# Patient Record
Sex: FEMALE | Race: WHITE | NOT HISPANIC OR LATINO | Employment: OTHER | URBAN - METROPOLITAN AREA
[De-identification: names, ages, dates, MRNs, and addresses within clinical notes are randomized per-mention and may not be internally consistent; named-entity substitution may affect disease eponyms.]

---

## 2019-12-02 PROBLEM — I50.23 ACUTE ON CHRONIC SYSTOLIC (CONGESTIVE) HEART FAILURE: Status: ACTIVE | Noted: 2018-04-12

## 2019-12-02 PROBLEM — E78.5 HYPERLIPIDEMIA LDL GOAL <70: Status: ACTIVE | Noted: 2019-12-02

## 2019-12-02 PROBLEM — I25.10 CORONARY ARTERY DISEASE INVOLVING NATIVE CORONARY ARTERY OF NATIVE HEART WITHOUT ANGINA PECTORIS: Status: ACTIVE | Noted: 2019-12-02

## 2019-12-02 PROBLEM — I25.5 ISCHEMIC CARDIOMYOPATHY: Status: ACTIVE | Noted: 2018-04-29

## 2019-12-02 PROBLEM — Z95.810 ICD (IMPLANTABLE CARDIOVERTER-DEFIBRILLATOR) IN PLACE: Status: ACTIVE | Noted: 2019-12-02

## 2019-12-02 PROBLEM — I50.9 CHF (NYHA CLASS III, ACC/AHA STAGE C): Status: ACTIVE | Noted: 2017-12-26

## 2019-12-02 PROBLEM — I42.0 DILATED CARDIOMYOPATHY: Status: ACTIVE | Noted: 2018-04-29

## 2019-12-02 PROBLEM — Z98.61 S/P CORONARY ANGIOPLASTY: Status: ACTIVE | Noted: 2019-12-02

## 2019-12-02 PROBLEM — R07.9 CHEST PAIN: Status: ACTIVE | Noted: 2018-02-09

## 2019-12-02 PROBLEM — I44.7 LBBB (LEFT BUNDLE BRANCH BLOCK): Status: ACTIVE | Noted: 2018-04-29

## 2019-12-11 PROBLEM — I34.0 NON-RHEUMATIC MITRAL REGURGITATION: Status: ACTIVE | Noted: 2018-04-12

## 2019-12-11 PROBLEM — I34.0 SEVERE MITRAL REGURGITATION: Status: ACTIVE | Noted: 2018-04-12

## 2019-12-11 PROBLEM — I44.7 LEFT BUNDLE BRANCH BLOCK: Status: ACTIVE | Noted: 2018-04-29

## 2019-12-11 PROBLEM — N17.9 ACUTE KIDNEY INJURY: Status: ACTIVE | Noted: 2018-03-07

## 2019-12-11 PROBLEM — D50.9 MICROCYTIC HYPOCHROMIC ANEMIA: Status: ACTIVE | Noted: 2018-03-07

## 2019-12-11 PROBLEM — Z95.5 PRESENCE OF DRUG COATED STENT IN LEFT CIRCUMFLEX CORONARY ARTERY: Status: ACTIVE | Noted: 2017-01-20

## 2019-12-16 PROBLEM — I50.23 ACUTE ON CHRONIC SYSTOLIC CHF (CONGESTIVE HEART FAILURE): Status: ACTIVE | Noted: 2019-12-16

## 2019-12-17 PROBLEM — I50.23 ACUTE ON CHRONIC SYSTOLIC (CONGESTIVE) HEART FAILURE: Status: RESOLVED | Noted: 2018-04-12 | Resolved: 2019-12-17

## 2019-12-17 PROBLEM — I27.20 PULMONARY HTN: Status: ACTIVE | Noted: 2019-12-17

## 2020-01-30 PROBLEM — E11.65 UNCONTROLLED TYPE 2 DIABETES MELLITUS WITH HYPERGLYCEMIA: Status: ACTIVE | Noted: 2020-01-30

## 2020-01-30 PROBLEM — I07.1 SEVERE TRICUSPID REGURGITATION: Status: ACTIVE | Noted: 2020-01-30

## 2020-01-30 PROBLEM — I70.1 ATHEROSCLEROTIC RENAL ARTERY STENOSIS: Status: ACTIVE | Noted: 2020-01-30

## 2020-02-01 PROBLEM — R73.9 HYPERGLYCEMIA: Status: ACTIVE | Noted: 2020-02-01

## 2020-02-13 PROBLEM — R51.9 NONINTRACTABLE HEADACHE: Status: ACTIVE | Noted: 2020-02-13

## 2020-02-13 PROBLEM — N39.0 RECURRENT UTI: Status: ACTIVE | Noted: 2020-02-13

## 2020-02-14 PROBLEM — I44.7 LEFT BUNDLE BRANCH BLOCK: Status: RESOLVED | Noted: 2018-04-29 | Resolved: 2020-02-14

## 2020-02-28 PROBLEM — I50.23 ACUTE ON CHRONIC SYSTOLIC CHF (CONGESTIVE HEART FAILURE): Status: RESOLVED | Noted: 2019-12-16 | Resolved: 2020-02-28

## 2020-03-12 PROBLEM — I50.9 ACUTE ON CHRONIC CONGESTIVE HEART FAILURE: Status: ACTIVE | Noted: 2020-03-12

## 2020-03-13 PROBLEM — J96.01 ACUTE RESPIRATORY FAILURE WITH HYPOXEMIA: Status: ACTIVE | Noted: 2020-03-13

## 2020-03-13 PROBLEM — Z59.6 PATIENT CANNOT AFFORD MEDICATIONS: Status: ACTIVE | Noted: 2020-03-13

## 2020-03-13 PROBLEM — Z59.86 PATIENT CANNOT AFFORD MEDICATIONS: Status: ACTIVE | Noted: 2020-03-13

## 2020-03-15 PROBLEM — N39.0 UTI (URINARY TRACT INFECTION): Status: ACTIVE | Noted: 2020-03-15

## 2020-03-15 PROBLEM — J01.10 ACUTE FRONTAL SINUSITIS: Status: ACTIVE | Noted: 2020-03-15

## 2020-03-16 PROBLEM — D61.818 PANCYTOPENIA: Status: ACTIVE | Noted: 2020-03-16

## 2020-03-16 PROBLEM — J96.01 ACUTE RESPIRATORY FAILURE WITH HYPOXEMIA: Status: RESOLVED | Noted: 2020-03-13 | Resolved: 2020-03-16

## 2020-05-29 PROBLEM — I50.9 CHF (NYHA CLASS III, ACC/AHA STAGE C): Status: RESOLVED | Noted: 2017-12-26 | Resolved: 2020-05-29

## 2020-05-29 PROBLEM — I50.9 ACUTE ON CHRONIC CONGESTIVE HEART FAILURE: Status: RESOLVED | Noted: 2020-03-12 | Resolved: 2020-05-29

## 2020-06-09 PROBLEM — I36.1 NONRHEUMATIC TRICUSPID VALVE REGURGITATION: Status: ACTIVE | Noted: 2020-01-30

## 2020-06-09 PROBLEM — G47.39 MIXED SLEEP APNEA: Status: ACTIVE | Noted: 2020-06-09

## 2020-06-09 PROBLEM — I42.0 ISCHEMIC DILATED CARDIOMYOPATHY: Status: ACTIVE | Noted: 2018-04-29

## 2020-06-09 PROBLEM — N18.30 STAGE 3 CHRONIC KIDNEY DISEASE: Status: ACTIVE | Noted: 2020-06-09

## 2020-06-10 PROBLEM — Z79.01 CHRONIC ANTICOAGULATION: Status: ACTIVE | Noted: 2020-06-10

## 2020-06-15 PROBLEM — J01.10 ACUTE FRONTAL SINUSITIS: Status: RESOLVED | Noted: 2020-03-15 | Resolved: 2020-06-15

## 2020-08-10 PROBLEM — I70.1 RENAL ARTERY STENOSIS: Status: ACTIVE | Noted: 2020-08-10

## 2020-12-11 PROBLEM — R00.2 PALPITATIONS: Status: ACTIVE | Noted: 2020-12-11

## 2021-05-17 PROBLEM — H25.13 NUCLEAR SCLEROTIC CATARACT OF BOTH EYES: Status: ACTIVE | Noted: 2021-05-17

## 2021-05-17 PROBLEM — E08.3493: Status: ACTIVE | Noted: 2021-05-17

## 2021-07-27 PROBLEM — D63.8 ANEMIA OF CHRONIC DISEASE: Status: ACTIVE | Noted: 2021-07-27

## 2021-09-27 PROBLEM — I16.0 HYPERTENSIVE URGENCY: Status: ACTIVE | Noted: 2021-09-27

## 2021-09-27 PROBLEM — I50.9 CONGESTIVE HEART FAILURE: Status: ACTIVE | Noted: 2021-09-27

## 2021-09-27 PROBLEM — I50.43 ACUTE ON CHRONIC COMBINED SYSTOLIC AND DIASTOLIC CHF (CONGESTIVE HEART FAILURE): Status: ACTIVE | Noted: 2021-09-27

## 2021-09-28 PROBLEM — R19.7 DIARRHEA OF PRESUMED INFECTIOUS ORIGIN: Status: ACTIVE | Noted: 2021-09-28

## 2021-09-28 PROBLEM — G47.30 SLEEP APNEA IN ADULT: Status: ACTIVE | Noted: 2021-09-28

## 2021-09-28 PROBLEM — I16.0 HYPERTENSIVE URGENCY: Status: RESOLVED | Noted: 2021-09-27 | Resolved: 2021-09-28

## 2021-09-28 PROBLEM — I16.1 HYPERTENSIVE EMERGENCY: Status: ACTIVE | Noted: 2021-09-28

## 2021-09-29 PROBLEM — I16.1 HYPERTENSIVE EMERGENCY: Status: RESOLVED | Noted: 2021-09-28 | Resolved: 2021-09-29

## 2021-10-06 PROBLEM — I47.10 SVT (SUPRAVENTRICULAR TACHYCARDIA): Status: ACTIVE | Noted: 2021-10-06

## 2021-11-10 PROBLEM — I50.43 ACUTE ON CHRONIC COMBINED SYSTOLIC AND DIASTOLIC CHF (CONGESTIVE HEART FAILURE): Status: RESOLVED | Noted: 2021-09-27 | Resolved: 2021-11-10

## 2021-11-10 PROBLEM — Z86.79 S/P CATHETER ABLATION OF SLOW PATHWAY: Status: ACTIVE | Noted: 2021-11-10

## 2021-11-10 PROBLEM — R60.0 EDEMA OF LEFT UPPER ARM: Status: ACTIVE | Noted: 2021-11-10

## 2021-11-10 PROBLEM — Z95.810 ICD (IMPLANTABLE CARDIOVERTER-DEFIBRILLATOR) IN PLACE: Status: RESOLVED | Noted: 2019-12-02 | Resolved: 2021-11-10

## 2021-11-10 PROBLEM — Z98.890 S/P CATHETER ABLATION OF SLOW PATHWAY: Status: ACTIVE | Noted: 2021-11-10

## 2021-11-10 PROBLEM — G47.30 SLEEP APNEA IN ADULT: Status: RESOLVED | Noted: 2021-09-28 | Resolved: 2021-11-10

## 2021-11-11 PROBLEM — J18.9 COMMUNITY ACQUIRED PNEUMONIA: Status: ACTIVE | Noted: 2021-11-11

## 2021-11-14 PROBLEM — I50.42 CHRONIC COMBINED SYSTOLIC (CONGESTIVE) AND DIASTOLIC (CONGESTIVE) HEART FAILURE: Status: ACTIVE | Noted: 2021-09-27

## 2021-11-16 PROBLEM — J18.9 COMMUNITY ACQUIRED PNEUMONIA: Status: RESOLVED | Noted: 2021-11-11 | Resolved: 2021-11-16

## 2021-11-16 PROBLEM — J96.01 ACUTE HYPOXEMIC RESPIRATORY FAILURE: Status: RESOLVED | Noted: 2020-03-13 | Resolved: 2021-11-16

## 2021-11-17 ENCOUNTER — PATIENT OUTREACH (OUTPATIENT)
Dept: ADMINISTRATIVE | Facility: CLINIC | Age: 56
End: 2021-11-17

## 2021-11-17 ENCOUNTER — PATIENT MESSAGE (OUTPATIENT)
Dept: ADMINISTRATIVE | Facility: CLINIC | Age: 56
End: 2021-11-17

## 2021-11-29 PROBLEM — E87.5 HYPERKALEMIA: Status: ACTIVE | Noted: 2021-11-29

## 2021-11-29 PROBLEM — N18.9 ACUTE KIDNEY INJURY SUPERIMPOSED ON CKD: Status: ACTIVE | Noted: 2018-03-07

## 2022-01-04 PROBLEM — F32.A DEPRESSION: Status: ACTIVE | Noted: 2022-01-04

## 2022-01-04 PROBLEM — I50.43 ACUTE ON CHRONIC COMBINED SYSTOLIC AND DIASTOLIC HEART FAILURE: Status: ACTIVE | Noted: 2022-01-04

## 2022-01-05 PROBLEM — R11.0 NAUSEA: Status: ACTIVE | Noted: 2022-01-05

## 2022-01-06 PROBLEM — R11.0 NAUSEA: Status: RESOLVED | Noted: 2022-01-05 | Resolved: 2022-01-06

## 2022-01-06 PROBLEM — I50.43 ACUTE ON CHRONIC COMBINED SYSTOLIC AND DIASTOLIC HEART FAILURE: Status: RESOLVED | Noted: 2022-01-04 | Resolved: 2022-01-06

## 2022-01-11 PROBLEM — H04.123 DRY EYE SYNDROME OF BOTH EYES: Status: ACTIVE | Noted: 2022-01-11

## 2022-01-11 PROBLEM — H25.813 COMBINED FORM OF AGE-RELATED CATARACT, BOTH EYES: Status: ACTIVE | Noted: 2022-01-11

## 2022-01-11 PROBLEM — H35.033 HYPERTENSIVE RETINOPATHY OF BOTH EYES: Status: ACTIVE | Noted: 2022-01-11

## 2022-01-13 PROBLEM — R07.9 CHEST PAIN: Status: RESOLVED | Noted: 2018-02-09 | Resolved: 2022-01-13

## 2022-01-13 PROBLEM — E11.65 UNCONTROLLED TYPE 2 DIABETES MELLITUS WITH HYPERGLYCEMIA: Status: RESOLVED | Noted: 2020-01-30 | Resolved: 2022-01-13

## 2022-01-13 PROBLEM — R19.7 DIARRHEA OF PRESUMED INFECTIOUS ORIGIN: Status: RESOLVED | Noted: 2021-09-28 | Resolved: 2022-01-13

## 2022-01-13 PROBLEM — Z95.5 PRESENCE OF DRUG COATED STENT IN LEFT CIRCUMFLEX CORONARY ARTERY: Status: RESOLVED | Noted: 2017-01-20 | Resolved: 2022-01-13

## 2022-01-13 PROBLEM — R73.9 HYPERGLYCEMIA: Status: RESOLVED | Noted: 2020-02-01 | Resolved: 2022-01-13

## 2022-01-13 PROBLEM — I70.1 RENAL ARTERY STENOSIS: Status: RESOLVED | Noted: 2020-08-10 | Resolved: 2022-01-13

## 2022-01-13 PROBLEM — N39.0 RECURRENT UTI: Status: RESOLVED | Noted: 2020-02-13 | Resolved: 2022-01-13

## 2022-01-13 PROBLEM — D61.818 PANCYTOPENIA: Status: RESOLVED | Noted: 2020-03-16 | Resolved: 2022-01-13

## 2022-01-13 PROBLEM — I50.23 ACUTE ON CHRONIC SYSTOLIC CONGESTIVE HEART FAILURE: Status: RESOLVED | Noted: 2019-12-16 | Resolved: 2022-01-13

## 2022-01-13 PROBLEM — D50.9 MICROCYTIC HYPOCHROMIC ANEMIA: Status: RESOLVED | Noted: 2018-03-07 | Resolved: 2022-01-13

## 2022-01-13 PROBLEM — N18.9 ACUTE KIDNEY INJURY SUPERIMPOSED ON CKD: Status: RESOLVED | Noted: 2018-03-07 | Resolved: 2022-01-13

## 2022-01-13 PROBLEM — J96.01 ACUTE HYPOXEMIC RESPIRATORY FAILURE: Status: RESOLVED | Noted: 2020-03-13 | Resolved: 2022-01-13

## 2022-01-13 PROBLEM — N17.9 ACUTE KIDNEY INJURY SUPERIMPOSED ON CKD: Status: RESOLVED | Noted: 2018-03-07 | Resolved: 2022-01-13

## 2022-01-13 PROBLEM — N18.4 STAGE 4 CHRONIC KIDNEY DISEASE: Status: ACTIVE | Noted: 2020-06-09

## 2022-01-13 PROBLEM — I16.1 HYPERTENSIVE EMERGENCY: Status: RESOLVED | Noted: 2021-09-28 | Resolved: 2022-01-13

## 2022-01-13 PROBLEM — R00.2 PALPITATIONS: Status: RESOLVED | Noted: 2020-12-11 | Resolved: 2022-01-13

## 2022-01-13 PROBLEM — N39.0 UTI (URINARY TRACT INFECTION): Status: RESOLVED | Noted: 2020-03-15 | Resolved: 2022-01-13

## 2022-02-01 PROBLEM — R07.9 CHEST PAIN: Status: ACTIVE | Noted: 2022-02-01

## 2022-02-02 PROBLEM — J18.9 PNEUMONIA: Status: RESOLVED | Noted: 2021-11-11 | Resolved: 2022-02-02

## 2022-02-02 PROBLEM — R07.9 CHEST PAIN: Status: RESOLVED | Noted: 2022-02-01 | Resolved: 2022-02-02

## 2022-02-02 PROBLEM — I50.9 ACUTE ON CHRONIC CONGESTIVE HEART FAILURE: Status: RESOLVED | Noted: 2020-03-12 | Resolved: 2022-02-02

## 2022-02-02 PROBLEM — E78.5 HYPERLIPIDEMIA LDL GOAL <70: Status: RESOLVED | Noted: 2019-12-02 | Resolved: 2022-02-02

## 2022-03-09 PROBLEM — I50.43 ACUTE ON CHRONIC COMBINED SYSTOLIC AND DIASTOLIC CHF (CONGESTIVE HEART FAILURE): Status: ACTIVE | Noted: 2022-03-09

## 2022-03-09 PROBLEM — K21.9 GERD (GASTROESOPHAGEAL REFLUX DISEASE): Status: ACTIVE | Noted: 2022-03-09

## 2022-03-09 PROBLEM — R11.2 NON-INTRACTABLE VOMITING WITH NAUSEA: Status: ACTIVE | Noted: 2022-03-09

## 2022-03-09 PROBLEM — J18.9 PNEUMONIA DUE TO INFECTIOUS ORGANISM: Status: ACTIVE | Noted: 2022-03-09

## 2022-03-11 PROBLEM — J18.9 PNEUMONIA DUE TO INFECTIOUS ORGANISM: Status: RESOLVED | Noted: 2022-03-09 | Resolved: 2022-03-11

## 2022-03-11 PROBLEM — I50.42 CHRONIC COMBINED SYSTOLIC (CONGESTIVE) AND DIASTOLIC (CONGESTIVE) HEART FAILURE: Status: RESOLVED | Noted: 2021-09-27 | Resolved: 2022-03-11

## 2022-03-13 PROBLEM — B96.20 E. COLI UTI: Status: ACTIVE | Noted: 2020-03-15

## 2022-03-13 PROBLEM — I50.43 ACUTE ON CHRONIC COMBINED SYSTOLIC AND DIASTOLIC CHF (CONGESTIVE HEART FAILURE): Status: RESOLVED | Noted: 2022-03-09 | Resolved: 2022-03-13

## 2022-03-13 PROBLEM — R11.2 NON-INTRACTABLE VOMITING WITH NAUSEA: Status: RESOLVED | Noted: 2022-03-09 | Resolved: 2022-03-13

## 2022-03-14 ENCOUNTER — PATIENT OUTREACH (OUTPATIENT)
Dept: ADMINISTRATIVE | Facility: CLINIC | Age: 57
End: 2022-03-14

## 2022-03-14 ENCOUNTER — PATIENT MESSAGE (OUTPATIENT)
Dept: ADMINISTRATIVE | Facility: CLINIC | Age: 57
End: 2022-03-14

## 2022-03-14 NOTE — PROGRESS NOTES
C3 nurse attempted to contact Ellen Roman for a TCC post hospital discharge follow up call. No answer. No voicemail available.The patient does not have a scheduled HOSFU appointment. Unable to send message sent to PCP staff for assistance with scheduling visit with patient. Message sent via [facility] portal for Post Discharge Attempt.

## 2022-03-15 NOTE — PROGRESS NOTES
C3 nurse spoke with Ellen Roman for a TCC post hospital discharge follow up call. The patient has a scheduled HOSFU appointment with Paco Amanda MD on 4/7/2022 @ [id number].  PCP changed per patient request to Dr. Amanda.

## 2022-03-15 NOTE — PROGRESS NOTES
C3 nurse attempted to contact Ellen Roman for a TCC post hospital discharge follow up call. No answer. No voicemail available.The patient does not have a scheduled HOSFU appointment. Unable to send message sent to PCP staff for assistance with scheduling visit with patient.

## 2022-04-16 PROBLEM — N18.5 ANEMIA IN STAGE 5 CHRONIC KIDNEY DISEASE, NOT ON CHRONIC DIALYSIS: Status: ACTIVE | Noted: 2022-04-16

## 2022-04-16 PROBLEM — D63.1 ANEMIA IN STAGE 5 CHRONIC KIDNEY DISEASE, NOT ON CHRONIC DIALYSIS: Status: ACTIVE | Noted: 2022-04-16

## 2022-04-16 PROBLEM — D50.8 IRON DEFICIENCY ANEMIA SECONDARY TO INADEQUATE DIETARY IRON INTAKE: Status: ACTIVE | Noted: 2022-04-16

## 2022-04-16 PROBLEM — E11.22 CKD STAGE 5 DUE TO TYPE 2 DIABETES MELLITUS: Status: ACTIVE | Noted: 2022-04-16

## 2022-04-26 PROBLEM — N18.4 STAGE 4 CHRONIC KIDNEY DISEASE: Status: RESOLVED | Noted: 2020-06-09 | Resolved: 2022-04-26

## 2022-04-26 PROBLEM — R79.89 ELEVATED TROPONIN: Status: ACTIVE | Noted: 2022-04-26

## 2022-04-27 PROBLEM — D64.9 ANEMIA: Status: ACTIVE | Noted: 2021-07-27

## 2022-05-11 PROBLEM — D50.9 HYPOCHROMIC MICROCYTIC ANEMIA: Status: RESOLVED | Noted: 2018-03-07 | Resolved: 2022-05-11

## 2022-05-14 PROBLEM — I50.43 ACUTE ON CHRONIC COMBINED SYSTOLIC AND DIASTOLIC HEART FAILURE: Status: RESOLVED | Noted: 2022-01-04 | Resolved: 2022-05-14

## 2022-05-16 ENCOUNTER — PATIENT OUTREACH (OUTPATIENT)
Dept: ADMINISTRATIVE | Facility: CLINIC | Age: 57
End: 2022-05-16

## 2022-05-16 NOTE — PROGRESS NOTES
C3 nurse spoke with Ellen Roman  for a TCC post hospital discharge follow up call. The patient does not have a scheduled HOSFU appointment with Paco Amanda MD within 7 days post hospital discharge date 5/14/22. C3 nurse was unable to schedule HOSFU appointment in [facility].    Message sent to PCP staff requesting they contact patient and schedule follow up appointment.

## 2022-05-20 PROBLEM — I95.2 HYPOTENSION DUE TO DRUGS: Status: ACTIVE | Noted: 2022-05-20

## 2022-05-20 PROBLEM — E87.20 METABOLIC ACIDOSIS: Status: ACTIVE | Noted: 2022-05-20

## 2022-05-20 PROBLEM — D50.9 ANEMIA, IRON DEFICIENCY: Status: ACTIVE | Noted: 2022-04-16

## 2022-05-20 PROBLEM — N25.81 SECONDARY HYPERPARATHYROIDISM OF RENAL ORIGIN: Status: ACTIVE | Noted: 2022-05-20

## 2022-05-20 PROBLEM — N04.9 NEPHROTIC SYNDROME: Status: ACTIVE | Noted: 2022-05-20

## 2022-05-20 PROBLEM — E87.70 VOLUME OVERLOAD: Status: ACTIVE | Noted: 2022-05-20

## 2022-05-25 PROBLEM — E87.70 HYPERVOLEMIA: Status: RESOLVED | Noted: 2022-05-20 | Resolved: 2022-05-25

## 2022-05-25 PROBLEM — I95.2 HYPOTENSION DUE TO DRUGS: Status: RESOLVED | Noted: 2022-05-20 | Resolved: 2022-05-25

## 2022-12-05 ENCOUNTER — PATIENT OUTREACH (OUTPATIENT)
Dept: ADMINISTRATIVE | Facility: HOSPITAL | Age: 57
End: 2022-12-05

## 2022-12-05 ENCOUNTER — PATIENT MESSAGE (OUTPATIENT)
Dept: ADMINISTRATIVE | Facility: HOSPITAL | Age: 57
End: 2022-12-05

## 2022-12-28 ENCOUNTER — PATIENT MESSAGE (OUTPATIENT)
Dept: ADMINISTRATIVE | Facility: HOSPITAL | Age: 57
End: 2022-12-28

## 2022-12-28 ENCOUNTER — PATIENT OUTREACH (OUTPATIENT)
Dept: ADMINISTRATIVE | Facility: HOSPITAL | Age: 57
End: 2022-12-28

## 2023-01-01 ENCOUNTER — HOSPITAL ENCOUNTER (INPATIENT)
Facility: HOSPITAL | Age: 58
LOS: 7 days | DRG: 260 | End: 2024-01-04
Attending: HOSPITALIST | Admitting: HOSPITALIST
Payer: MEDICARE

## 2023-01-01 ENCOUNTER — ANESTHESIA (OUTPATIENT)
Dept: MEDSURG UNIT | Facility: HOSPITAL | Age: 58
DRG: 260 | End: 2023-01-01
Payer: MEDICARE

## 2023-01-01 ENCOUNTER — PATIENT MESSAGE (OUTPATIENT)
Dept: ADMINISTRATIVE | Facility: HOSPITAL | Age: 58
End: 2023-01-01

## 2023-01-01 ENCOUNTER — DOCUMENTATION ONLY (OUTPATIENT)
Dept: CARDIOLOGY | Facility: HOSPITAL | Age: 58
End: 2023-01-01
Payer: MEDICARE

## 2023-01-01 ENCOUNTER — ANESTHESIA EVENT (OUTPATIENT)
Dept: MEDSURG UNIT | Facility: HOSPITAL | Age: 58
DRG: 260 | End: 2023-01-01
Payer: MEDICARE

## 2023-01-01 DIAGNOSIS — Z95.810 CARDIAC RESYNCHRONIZATION THERAPY DEFIBRILLATOR (CRT-D) IN PLACE: ICD-10-CM

## 2023-01-01 DIAGNOSIS — E83.9 CHRONIC KIDNEY DISEASE-MINERAL AND BONE DISORDER: ICD-10-CM

## 2023-01-01 DIAGNOSIS — R78.81 BACTEREMIA: ICD-10-CM

## 2023-01-01 DIAGNOSIS — M89.9 CHRONIC KIDNEY DISEASE-MINERAL AND BONE DISORDER: ICD-10-CM

## 2023-01-01 DIAGNOSIS — N18.9 CHRONIC KIDNEY DISEASE-MINERAL AND BONE DISORDER: ICD-10-CM

## 2023-01-01 DIAGNOSIS — I25.10 CORONARY ARTERY DISEASE INVOLVING NATIVE CORONARY ARTERY OF NATIVE HEART WITHOUT ANGINA PECTORIS: ICD-10-CM

## 2023-01-01 DIAGNOSIS — I33.0 VEGETATION OF HEART VALVE: ICD-10-CM

## 2023-01-01 DIAGNOSIS — I38 ENDOCARDITIS: ICD-10-CM

## 2023-01-01 DIAGNOSIS — D50.8 OTHER IRON DEFICIENCY ANEMIA: ICD-10-CM

## 2023-01-01 DIAGNOSIS — B95.62 MRSA BACTEREMIA: ICD-10-CM

## 2023-01-01 DIAGNOSIS — N18.6 ESRD (END STAGE RENAL DISEASE): Primary | ICD-10-CM

## 2023-01-01 DIAGNOSIS — I38 ENDOCARDITIS, UNSPECIFIED CHRONICITY, UNSPECIFIED ENDOCARDITIS TYPE: ICD-10-CM

## 2023-01-01 DIAGNOSIS — T82.7XXA: ICD-10-CM

## 2023-01-01 DIAGNOSIS — E13.9 LADA (LATENT AUTOIMMUNE DIABETES IN ADULTS), MANAGED AS TYPE 1: ICD-10-CM

## 2023-01-01 DIAGNOSIS — I33.0 INFECTIVE ENDOCARDITIS: ICD-10-CM

## 2023-01-01 DIAGNOSIS — R78.81 MRSA BACTEREMIA: ICD-10-CM

## 2023-01-01 LAB
ALBUMIN SERPL BCP-MCNC: 2.1 G/DL (ref 3.5–5.2)
ALBUMIN SERPL BCP-MCNC: 2.2 G/DL (ref 3.5–5.2)
ALBUMIN SERPL BCP-MCNC: 2.3 G/DL (ref 3.5–5.2)
ALP SERPL-CCNC: 53 U/L (ref 55–135)
ALP SERPL-CCNC: 59 U/L (ref 55–135)
ALP SERPL-CCNC: 60 U/L (ref 55–135)
ALP SERPL-CCNC: 64 U/L (ref 55–135)
ALT SERPL W/O P-5'-P-CCNC: 6 U/L (ref 10–44)
ALT SERPL W/O P-5'-P-CCNC: 6 U/L (ref 10–44)
ALT SERPL W/O P-5'-P-CCNC: 8 U/L (ref 10–44)
ALT SERPL W/O P-5'-P-CCNC: 8 U/L (ref 10–44)
ANION GAP SERPL CALC-SCNC: 12 MMOL/L (ref 8–16)
ANION GAP SERPL CALC-SCNC: 13 MMOL/L (ref 8–16)
ANION GAP SERPL CALC-SCNC: 14 MMOL/L (ref 8–16)
ANION GAP SERPL CALC-SCNC: 15 MMOL/L (ref 8–16)
APTT PPP: 25.9 SEC (ref 21–32)
APTT PPP: 28 SEC (ref 21–32)
APTT PPP: 28.5 SEC (ref 21–32)
APTT PPP: 29.3 SEC (ref 21–32)
APTT PPP: 29.8 SEC (ref 21–32)
APTT PPP: 31.6 SEC (ref 21–32)
APTT PPP: 33 SEC (ref 21–32)
APTT PPP: 43.4 SEC (ref 21–32)
APTT PPP: 43.4 SEC (ref 21–32)
ASCENDING AORTA: 3.11 CM
AST SERPL-CCNC: 10 U/L (ref 10–40)
AST SERPL-CCNC: 10 U/L (ref 10–40)
AST SERPL-CCNC: 15 U/L (ref 10–40)
AST SERPL-CCNC: 8 U/L (ref 10–40)
AV INDEX (PROSTH): 0.58
AV MEAN GRADIENT: 5 MMHG
AV PEAK GRADIENT: 8 MMHG
AV VALVE AREA BY VELOCITY RATIO: 1.83 CM²
AV VALVE AREA: 1.94 CM²
AV VELOCITY RATIO: 0.55
B-OH-BUTYR BLD STRIP-SCNC: 0.3 MMOL/L (ref 0–0.5)
BASOPHILS # BLD AUTO: 0.01 K/UL (ref 0–0.2)
BASOPHILS # BLD AUTO: 0.02 K/UL (ref 0–0.2)
BASOPHILS # BLD AUTO: 0.03 K/UL (ref 0–0.2)
BASOPHILS NFR BLD: 0.2 % (ref 0–1.9)
BASOPHILS NFR BLD: 0.3 % (ref 0–1.9)
BASOPHILS NFR BLD: 0.4 % (ref 0–1.9)
BASOPHILS NFR BLD: 0.5 % (ref 0–1.9)
BILIRUB SERPL-MCNC: 0.3 MG/DL (ref 0.1–1)
BILIRUB SERPL-MCNC: 0.3 MG/DL (ref 0.1–1)
BILIRUB SERPL-MCNC: 0.4 MG/DL (ref 0.1–1)
BILIRUB SERPL-MCNC: 0.4 MG/DL (ref 0.1–1)
BSA FOR ECHO PROCEDURE: 1.93 M2
BSA FOR ECHO PROCEDURE: 1.93 M2
BUN SERPL-MCNC: 12 MG/DL (ref 6–20)
BUN SERPL-MCNC: 15 MG/DL (ref 6–20)
BUN SERPL-MCNC: 18 MG/DL (ref 6–20)
BUN SERPL-MCNC: 21 MG/DL (ref 6–20)
BUN SERPL-MCNC: 21 MG/DL (ref 6–20)
BUN SERPL-MCNC: 28 MG/DL (ref 6–20)
CALCIUM SERPL-MCNC: 7.6 MG/DL (ref 8.7–10.5)
CALCIUM SERPL-MCNC: 7.6 MG/DL (ref 8.7–10.5)
CALCIUM SERPL-MCNC: 8.1 MG/DL (ref 8.7–10.5)
CALCIUM SERPL-MCNC: 8.2 MG/DL (ref 8.7–10.5)
CALCIUM SERPL-MCNC: 8.5 MG/DL (ref 8.7–10.5)
CALCIUM SERPL-MCNC: 8.6 MG/DL (ref 8.7–10.5)
CHLORIDE SERPL-SCNC: 95 MMOL/L (ref 95–110)
CHLORIDE SERPL-SCNC: 96 MMOL/L (ref 95–110)
CHLORIDE SERPL-SCNC: 97 MMOL/L (ref 95–110)
CHLORIDE SERPL-SCNC: 97 MMOL/L (ref 95–110)
CHLORIDE SERPL-SCNC: 98 MMOL/L (ref 95–110)
CHLORIDE SERPL-SCNC: 99 MMOL/L (ref 95–110)
CO2 SERPL-SCNC: 18 MMOL/L (ref 23–29)
CO2 SERPL-SCNC: 22 MMOL/L (ref 23–29)
CO2 SERPL-SCNC: 24 MMOL/L (ref 23–29)
CO2 SERPL-SCNC: 24 MMOL/L (ref 23–29)
CO2 SERPL-SCNC: 26 MMOL/L (ref 23–29)
CO2 SERPL-SCNC: 26 MMOL/L (ref 23–29)
CREAT SERPL-MCNC: 3.6 MG/DL (ref 0.5–1.4)
CREAT SERPL-MCNC: 3.8 MG/DL (ref 0.5–1.4)
CREAT SERPL-MCNC: 4.1 MG/DL (ref 0.5–1.4)
CREAT SERPL-MCNC: 4.1 MG/DL (ref 0.5–1.4)
CREAT SERPL-MCNC: 4.4 MG/DL (ref 0.5–1.4)
CREAT SERPL-MCNC: 5.9 MG/DL (ref 0.5–1.4)
CV ECHO LV RWT: 0.53 CM
DIFFERENTIAL METHOD BLD: ABNORMAL
DOP CALC AO PEAK VEL: 1.42 M/S
DOP CALC AO VTI: 24.89 CM
DOP CALC LVOT AREA: 3.3 CM2
DOP CALC LVOT DIAMETER: 2.06 CM
DOP CALC LVOT PEAK VEL: 0.78 M/S
DOP CALC LVOT STROKE VOLUME: 48.17 CM3
DOP CALC MV VTI: 42.4 CM
DOP CALCLVOT PEAK VEL VTI: 14.46 CM
E WAVE DECELERATION TIME: 456.41 MSEC
E/A RATIO: 0.77
E/E' RATIO: 36.44 M/S
ECHO LV POSTERIOR WALL: 1.27 CM (ref 0.6–1.1)
EOSINOPHIL # BLD AUTO: 0.1 K/UL (ref 0–0.5)
EOSINOPHIL # BLD AUTO: 0.2 K/UL (ref 0–0.5)
EOSINOPHIL NFR BLD: 1.6 % (ref 0–8)
EOSINOPHIL NFR BLD: 1.9 % (ref 0–8)
EOSINOPHIL NFR BLD: 2.4 % (ref 0–8)
EOSINOPHIL NFR BLD: 2.8 % (ref 0–8)
ERYTHROCYTE [DISTWIDTH] IN BLOOD BY AUTOMATED COUNT: 15.7 % (ref 11.5–14.5)
ERYTHROCYTE [DISTWIDTH] IN BLOOD BY AUTOMATED COUNT: 15.8 % (ref 11.5–14.5)
ERYTHROCYTE [DISTWIDTH] IN BLOOD BY AUTOMATED COUNT: 15.9 % (ref 11.5–14.5)
EST. GFR  (NO RACE VARIABLE): 11.1 ML/MIN/1.73 M^2
EST. GFR  (NO RACE VARIABLE): 12.1 ML/MIN/1.73 M^2
EST. GFR  (NO RACE VARIABLE): 12.1 ML/MIN/1.73 M^2
EST. GFR  (NO RACE VARIABLE): 13.2 ML/MIN/1.73 M^2
EST. GFR  (NO RACE VARIABLE): 14.1 ML/MIN/1.73 M^2
EST. GFR  (NO RACE VARIABLE): 7.8 ML/MIN/1.73 M^2
FRACTIONAL SHORTENING: 27 % (ref 28–44)
GLUCOSE SERPL-MCNC: 221 MG/DL (ref 70–110)
GLUCOSE SERPL-MCNC: 233 MG/DL (ref 70–110)
GLUCOSE SERPL-MCNC: 309 MG/DL (ref 70–110)
GLUCOSE SERPL-MCNC: 309 MG/DL (ref 70–110)
GLUCOSE SERPL-MCNC: 345 MG/DL (ref 70–110)
GLUCOSE SERPL-MCNC: 441 MG/DL (ref 70–110)
HBV CORE AB SERPL QL IA: NORMAL
HBV SURFACE AB SER-ACNC: 37.09 MIU/ML
HBV SURFACE AB SER-ACNC: REACTIVE M[IU]/ML
HBV SURFACE AG SERPL QL IA: NORMAL
HCT VFR BLD AUTO: 23.2 % (ref 37–48.5)
HCT VFR BLD AUTO: 23.6 % (ref 37–48.5)
HCT VFR BLD AUTO: 24.7 % (ref 37–48.5)
HCT VFR BLD AUTO: 24.7 % (ref 37–48.5)
HCT VFR BLD AUTO: 24.8 % (ref 37–48.5)
HCT VFR BLD AUTO: 25.1 % (ref 37–48.5)
HGB BLD-MCNC: 7.4 G/DL (ref 12–16)
HGB BLD-MCNC: 7.6 G/DL (ref 12–16)
HGB BLD-MCNC: 7.7 G/DL (ref 12–16)
HGB BLD-MCNC: 7.8 G/DL (ref 12–16)
HGB BLD-MCNC: 7.8 G/DL (ref 12–16)
HGB BLD-MCNC: 7.9 G/DL (ref 12–16)
HR MV ECHO: 90 BPM
IMM GRANULOCYTES # BLD AUTO: 0.03 K/UL (ref 0–0.04)
IMM GRANULOCYTES # BLD AUTO: 0.04 K/UL (ref 0–0.04)
IMM GRANULOCYTES # BLD AUTO: 0.05 K/UL (ref 0–0.04)
IMM GRANULOCYTES NFR BLD AUTO: 0.5 % (ref 0–0.5)
IMM GRANULOCYTES NFR BLD AUTO: 0.6 % (ref 0–0.5)
IMM GRANULOCYTES NFR BLD AUTO: 0.8 % (ref 0–0.5)
IMM GRANULOCYTES NFR BLD AUTO: 0.8 % (ref 0–0.5)
INR PPP: 1 (ref 0.8–1.2)
INR PPP: 1 (ref 0.8–1.2)
INTERVENTRICULAR SEPTUM: 1.26 CM (ref 0.6–1.1)
IVC DIAMETER: 2.3 CM
LA MAJOR: 7.18 CM
LA MINOR: 7.28 CM
LA WIDTH: 4.1 CM
LEFT ATRIUM SIZE: 3.8 CM
LEFT ATRIUM VOLUME INDEX: 50.4 ML/M2
LEFT ATRIUM VOLUME: 95.74 CM3
LEFT INTERNAL DIMENSION IN SYSTOLE: 3.53 CM (ref 2.1–4)
LEFT VENTRICLE DIASTOLIC VOLUME INDEX: 56.74 ML/M2
LEFT VENTRICLE DIASTOLIC VOLUME: 107.81 ML
LEFT VENTRICLE MASS INDEX: 125 G/M2
LEFT VENTRICLE SYSTOLIC VOLUME INDEX: 27.3 ML/M2
LEFT VENTRICLE SYSTOLIC VOLUME: 51.86 ML
LEFT VENTRICULAR INTERNAL DIMENSION IN DIASTOLE: 4.81 CM (ref 3.5–6)
LEFT VENTRICULAR MASS: 237.02 G
LV LATERAL E/E' RATIO: 41 M/S
LV SEPTAL E/E' RATIO: 32.8 M/S
LYMPHOCYTES # BLD AUTO: 0.5 K/UL (ref 1–4.8)
LYMPHOCYTES # BLD AUTO: 0.5 K/UL (ref 1–4.8)
LYMPHOCYTES # BLD AUTO: 0.6 K/UL (ref 1–4.8)
LYMPHOCYTES # BLD AUTO: 0.7 K/UL (ref 1–4.8)
LYMPHOCYTES # BLD AUTO: 0.7 K/UL (ref 1–4.8)
LYMPHOCYTES # BLD AUTO: 0.8 K/UL (ref 1–4.8)
LYMPHOCYTES NFR BLD: 11.9 % (ref 18–48)
LYMPHOCYTES NFR BLD: 12.1 % (ref 18–48)
LYMPHOCYTES NFR BLD: 12.9 % (ref 18–48)
LYMPHOCYTES NFR BLD: 14.6 % (ref 18–48)
LYMPHOCYTES NFR BLD: 9.4 % (ref 18–48)
LYMPHOCYTES NFR BLD: 9.7 % (ref 18–48)
MAGNESIUM SERPL-MCNC: 1.7 MG/DL (ref 1.6–2.6)
MAGNESIUM SERPL-MCNC: 1.8 MG/DL (ref 1.6–2.6)
MCH RBC QN AUTO: 28.8 PG (ref 27–31)
MCH RBC QN AUTO: 29 PG (ref 27–31)
MCH RBC QN AUTO: 29.3 PG (ref 27–31)
MCH RBC QN AUTO: 29.4 PG (ref 27–31)
MCH RBC QN AUTO: 29.5 PG (ref 27–31)
MCH RBC QN AUTO: 29.8 PG (ref 27–31)
MCHC RBC AUTO-ENTMCNC: 31.2 G/DL (ref 32–36)
MCHC RBC AUTO-ENTMCNC: 31.5 G/DL (ref 32–36)
MCHC RBC AUTO-ENTMCNC: 31.5 G/DL (ref 32–36)
MCHC RBC AUTO-ENTMCNC: 31.6 G/DL (ref 32–36)
MCHC RBC AUTO-ENTMCNC: 31.9 G/DL (ref 32–36)
MCHC RBC AUTO-ENTMCNC: 32.2 G/DL (ref 32–36)
MCV RBC AUTO: 91 FL (ref 82–98)
MCV RBC AUTO: 91 FL (ref 82–98)
MCV RBC AUTO: 93 FL (ref 82–98)
MCV RBC AUTO: 93 FL (ref 82–98)
MCV RBC AUTO: 94 FL (ref 82–98)
MCV RBC AUTO: 94 FL (ref 82–98)
MONOCYTES # BLD AUTO: 0.4 K/UL (ref 0.3–1)
MONOCYTES # BLD AUTO: 0.5 K/UL (ref 0.3–1)
MONOCYTES # BLD AUTO: 0.6 K/UL (ref 0.3–1)
MONOCYTES NFR BLD: 7.2 % (ref 4–15)
MONOCYTES NFR BLD: 7.8 % (ref 4–15)
MONOCYTES NFR BLD: 8.2 % (ref 4–15)
MONOCYTES NFR BLD: 8.5 % (ref 4–15)
MONOCYTES NFR BLD: 8.5 % (ref 4–15)
MONOCYTES NFR BLD: 8.9 % (ref 4–15)
MRSA ID BY PCR: POSITIVE
MV MEAN GRADIENT: 7 MMHG
MV PEAK A VEL: 2.12 M/S
MV PEAK E VEL: 1.64 M/S
MV PEAK GRADIENT: 16 MMHG
MV STENOSIS PRESSURE HALF TIME: 132.36 MS
MV VALVE AREA BY CONTINUITY EQUATION: 1.14 CM2
MV VALVE AREA P 1/2 METHOD: 1.66 CM2
NEUTROPHILS # BLD AUTO: 3.7 K/UL (ref 1.8–7.7)
NEUTROPHILS # BLD AUTO: 3.9 K/UL (ref 1.8–7.7)
NEUTROPHILS # BLD AUTO: 4 K/UL (ref 1.8–7.7)
NEUTROPHILS # BLD AUTO: 4 K/UL (ref 1.8–7.7)
NEUTROPHILS # BLD AUTO: 4.5 K/UL (ref 1.8–7.7)
NEUTROPHILS # BLD AUTO: 4.6 K/UL (ref 1.8–7.7)
NEUTROPHILS NFR BLD: 73.6 % (ref 38–73)
NEUTROPHILS NFR BLD: 74.5 % (ref 38–73)
NEUTROPHILS NFR BLD: 77 % (ref 38–73)
NEUTROPHILS NFR BLD: 77 % (ref 38–73)
NEUTROPHILS NFR BLD: 79.5 % (ref 38–73)
NEUTROPHILS NFR BLD: 80.7 % (ref 38–73)
NRBC BLD-RTO: 0 /100 WBC
PHOSPHATE SERPL-MCNC: 3.5 MG/DL (ref 2.7–4.5)
PISA TR MAX VEL: 3.91 M/S
PLATELET # BLD AUTO: 105 K/UL (ref 150–450)
PLATELET # BLD AUTO: 108 K/UL (ref 150–450)
PLATELET # BLD AUTO: 109 K/UL (ref 150–450)
PLATELET # BLD AUTO: 110 K/UL (ref 150–450)
PLATELET # BLD AUTO: 132 K/UL (ref 150–450)
PLATELET # BLD AUTO: 149 K/UL (ref 150–450)
PMV BLD AUTO: 11.2 FL (ref 9.2–12.9)
PMV BLD AUTO: 11.4 FL (ref 9.2–12.9)
PMV BLD AUTO: 11.5 FL (ref 9.2–12.9)
PMV BLD AUTO: 11.9 FL (ref 9.2–12.9)
PMV BLD AUTO: 12 FL (ref 9.2–12.9)
PMV BLD AUTO: 12.9 FL (ref 9.2–12.9)
POCT GLUCOSE: 115 MG/DL (ref 70–110)
POCT GLUCOSE: 136 MG/DL (ref 70–110)
POCT GLUCOSE: 159 MG/DL (ref 70–110)
POCT GLUCOSE: 161 MG/DL (ref 70–110)
POCT GLUCOSE: 163 MG/DL (ref 70–110)
POCT GLUCOSE: 167 MG/DL (ref 70–110)
POCT GLUCOSE: 171 MG/DL (ref 70–110)
POCT GLUCOSE: 179 MG/DL (ref 70–110)
POCT GLUCOSE: 192 MG/DL (ref 70–110)
POCT GLUCOSE: 205 MG/DL (ref 70–110)
POCT GLUCOSE: 208 MG/DL (ref 70–110)
POCT GLUCOSE: 234 MG/DL (ref 70–110)
POCT GLUCOSE: 342 MG/DL (ref 70–110)
POCT GLUCOSE: 358 MG/DL (ref 70–110)
POCT GLUCOSE: 404 MG/DL (ref 70–110)
POCT GLUCOSE: 436 MG/DL (ref 70–110)
POCT GLUCOSE: 476 MG/DL (ref 70–110)
POCT GLUCOSE: 51 MG/DL (ref 70–110)
POTASSIUM SERPL-SCNC: 3.9 MMOL/L (ref 3.5–5.1)
POTASSIUM SERPL-SCNC: 4 MMOL/L (ref 3.5–5.1)
POTASSIUM SERPL-SCNC: 4 MMOL/L (ref 3.5–5.1)
POTASSIUM SERPL-SCNC: 4.1 MMOL/L (ref 3.5–5.1)
POTASSIUM SERPL-SCNC: 4.1 MMOL/L (ref 3.5–5.1)
POTASSIUM SERPL-SCNC: 4.8 MMOL/L (ref 3.5–5.1)
PROT SERPL-MCNC: 5.9 G/DL (ref 6–8.4)
PROT SERPL-MCNC: 5.9 G/DL (ref 6–8.4)
PROT SERPL-MCNC: 6.2 G/DL (ref 6–8.4)
PROT SERPL-MCNC: 6.7 G/DL (ref 6–8.4)
PROTHROMBIN TIME: 11 SEC (ref 9–12.5)
PROTHROMBIN TIME: 11.1 SEC (ref 9–12.5)
RA MAJOR: 5.13 CM
RA PRESSURE ESTIMATED: 15 MMHG
RA WIDTH: 4.39 CM
RBC # BLD AUTO: 2.55 M/UL (ref 4–5.4)
RBC # BLD AUTO: 2.59 M/UL (ref 4–5.4)
RBC # BLD AUTO: 2.62 M/UL (ref 4–5.4)
RBC # BLD AUTO: 2.64 M/UL (ref 4–5.4)
RBC # BLD AUTO: 2.67 M/UL (ref 4–5.4)
RBC # BLD AUTO: 2.69 M/UL (ref 4–5.4)
RIGHT VENTRICULAR END-DIASTOLIC DIMENSION: 3.72 CM
RV TB RVSP: 19 MMHG
SINUS: 3.24 CM
SODIUM SERPL-SCNC: 129 MMOL/L (ref 136–145)
SODIUM SERPL-SCNC: 132 MMOL/L (ref 136–145)
SODIUM SERPL-SCNC: 132 MMOL/L (ref 136–145)
SODIUM SERPL-SCNC: 135 MMOL/L (ref 136–145)
SODIUM SERPL-SCNC: 135 MMOL/L (ref 136–145)
SODIUM SERPL-SCNC: 137 MMOL/L (ref 136–145)
STAPH AUREUS ID BY PCR: POSITIVE
STJ: 2.5 CM
TDI LATERAL: 0.04 M/S
TDI SEPTAL: 0.05 M/S
TDI: 0.05 M/S
TR MAX PG: 61 MMHG
TRICUSPID ANNULAR PLANE SYSTOLIC EXCURSION: 2.33 CM
TV REST PULMONARY ARTERY PRESSURE: 76 MMHG
WBC # BLD AUTO: 4.86 K/UL (ref 3.9–12.7)
WBC # BLD AUTO: 5.01 K/UL (ref 3.9–12.7)
WBC # BLD AUTO: 5.08 K/UL (ref 3.9–12.7)
WBC # BLD AUTO: 5.13 K/UL (ref 3.9–12.7)
WBC # BLD AUTO: 5.78 K/UL (ref 3.9–12.7)
WBC # BLD AUTO: 6.19 K/UL (ref 3.9–12.7)
Z-SCORE OF LEFT VENTRICULAR DIMENSION IN END DIASTOLE: -1.03
Z-SCORE OF LEFT VENTRICULAR DIMENSION IN END SYSTOLE: 0.57

## 2023-01-01 PROCEDURE — 94640 AIRWAY INHALATION TREATMENT: CPT

## 2023-01-01 PROCEDURE — 63600175 PHARM REV CODE 636 W HCPCS: Performed by: STUDENT IN AN ORGANIZED HEALTH CARE EDUCATION/TRAINING PROGRAM

## 2023-01-01 PROCEDURE — 85610 PROTHROMBIN TIME: CPT | Performed by: STUDENT IN AN ORGANIZED HEALTH CARE EDUCATION/TRAINING PROGRAM

## 2023-01-01 PROCEDURE — 20600001 HC STEP DOWN PRIVATE ROOM

## 2023-01-01 PROCEDURE — 80048 BASIC METABOLIC PNL TOTAL CA: CPT | Mod: XB | Performed by: STUDENT IN AN ORGANIZED HEALTH CARE EDUCATION/TRAINING PROGRAM

## 2023-01-01 PROCEDURE — 36415 COLL VENOUS BLD VENIPUNCTURE: CPT | Performed by: STUDENT IN AN ORGANIZED HEALTH CARE EDUCATION/TRAINING PROGRAM

## 2023-01-01 PROCEDURE — 99223 1ST HOSP IP/OBS HIGH 75: CPT | Mod: ,,, | Performed by: NURSE PRACTITIONER

## 2023-01-01 PROCEDURE — 99233 SBSQ HOSP IP/OBS HIGH 50: CPT | Mod: ,,, | Performed by: INTERNAL MEDICINE

## 2023-01-01 PROCEDURE — 90935 HEMODIALYSIS ONE EVALUATION: CPT | Mod: ,,, | Performed by: NURSE PRACTITIONER

## 2023-01-01 PROCEDURE — 85730 THROMBOPLASTIN TIME PARTIAL: CPT | Mod: 91 | Performed by: STUDENT IN AN ORGANIZED HEALTH CARE EDUCATION/TRAINING PROGRAM

## 2023-01-01 PROCEDURE — 99223 1ST HOSP IP/OBS HIGH 75: CPT | Mod: ,,, | Performed by: INTERNAL MEDICINE

## 2023-01-01 PROCEDURE — 63600175 PHARM REV CODE 636 W HCPCS: Performed by: NURSE ANESTHETIST, CERTIFIED REGISTERED

## 2023-01-01 PROCEDURE — 80053 COMPREHEN METABOLIC PANEL: CPT | Performed by: STUDENT IN AN ORGANIZED HEALTH CARE EDUCATION/TRAINING PROGRAM

## 2023-01-01 PROCEDURE — 87340 HEPATITIS B SURFACE AG IA: CPT | Performed by: STUDENT IN AN ORGANIZED HEALTH CARE EDUCATION/TRAINING PROGRAM

## 2023-01-01 PROCEDURE — 36415 COLL VENOUS BLD VENIPUNCTURE: CPT | Mod: XB | Performed by: STUDENT IN AN ORGANIZED HEALTH CARE EDUCATION/TRAINING PROGRAM

## 2023-01-01 PROCEDURE — 25000242 PHARM REV CODE 250 ALT 637 W/ HCPCS: Performed by: STUDENT IN AN ORGANIZED HEALTH CARE EDUCATION/TRAINING PROGRAM

## 2023-01-01 PROCEDURE — 37000009 HC ANESTHESIA EA ADD 15 MINS

## 2023-01-01 PROCEDURE — 25000003 PHARM REV CODE 250: Performed by: STUDENT IN AN ORGANIZED HEALTH CARE EDUCATION/TRAINING PROGRAM

## 2023-01-01 PROCEDURE — 87150 DNA/RNA AMPLIFIED PROBE: CPT | Performed by: STUDENT IN AN ORGANIZED HEALTH CARE EDUCATION/TRAINING PROGRAM

## 2023-01-01 PROCEDURE — 85730 THROMBOPLASTIN TIME PARTIAL: CPT | Performed by: STUDENT IN AN ORGANIZED HEALTH CARE EDUCATION/TRAINING PROGRAM

## 2023-01-01 PROCEDURE — 63600175 PHARM REV CODE 636 W HCPCS

## 2023-01-01 PROCEDURE — 83735 ASSAY OF MAGNESIUM: CPT | Performed by: STUDENT IN AN ORGANIZED HEALTH CARE EDUCATION/TRAINING PROGRAM

## 2023-01-01 PROCEDURE — 87040 BLOOD CULTURE FOR BACTERIA: CPT | Mod: 59 | Performed by: STUDENT IN AN ORGANIZED HEALTH CARE EDUCATION/TRAINING PROGRAM

## 2023-01-01 PROCEDURE — 25000003 PHARM REV CODE 250: Performed by: INTERNAL MEDICINE

## 2023-01-01 PROCEDURE — 37000008 HC ANESTHESIA 1ST 15 MINUTES

## 2023-01-01 PROCEDURE — 63600175 PHARM REV CODE 636 W HCPCS: Performed by: NURSE PRACTITIONER

## 2023-01-01 PROCEDURE — 99233 SBSQ HOSP IP/OBS HIGH 50: CPT | Mod: GC,,, | Performed by: INTERNAL MEDICINE

## 2023-01-01 PROCEDURE — 63600175 PHARM REV CODE 636 W HCPCS: Mod: JZ,JG | Performed by: STUDENT IN AN ORGANIZED HEALTH CARE EDUCATION/TRAINING PROGRAM

## 2023-01-01 PROCEDURE — 25000242 PHARM REV CODE 250 ALT 637 W/ HCPCS

## 2023-01-01 PROCEDURE — 82010 KETONE BODYS QUAN: CPT | Performed by: STUDENT IN AN ORGANIZED HEALTH CARE EDUCATION/TRAINING PROGRAM

## 2023-01-01 PROCEDURE — 85025 COMPLETE CBC W/AUTO DIFF WBC: CPT | Mod: 91 | Performed by: STUDENT IN AN ORGANIZED HEALTH CARE EDUCATION/TRAINING PROGRAM

## 2023-01-01 PROCEDURE — 94761 N-INVAS EAR/PLS OXIMETRY MLT: CPT

## 2023-01-01 PROCEDURE — 84100 ASSAY OF PHOSPHORUS: CPT | Performed by: STUDENT IN AN ORGANIZED HEALTH CARE EDUCATION/TRAINING PROGRAM

## 2023-01-01 PROCEDURE — 85025 COMPLETE CBC W/AUTO DIFF WBC: CPT | Performed by: STUDENT IN AN ORGANIZED HEALTH CARE EDUCATION/TRAINING PROGRAM

## 2023-01-01 PROCEDURE — 85610 PROTHROMBIN TIME: CPT | Mod: 91 | Performed by: STUDENT IN AN ORGANIZED HEALTH CARE EDUCATION/TRAINING PROGRAM

## 2023-01-01 PROCEDURE — D9220A PRA ANESTHESIA: Mod: CRNA,,, | Performed by: NURSE ANESTHETIST, CERTIFIED REGISTERED

## 2023-01-01 PROCEDURE — 86704 HEP B CORE ANTIBODY TOTAL: CPT | Performed by: STUDENT IN AN ORGANIZED HEALTH CARE EDUCATION/TRAINING PROGRAM

## 2023-01-01 PROCEDURE — 99222 1ST HOSP IP/OBS MODERATE 55: CPT | Mod: ,,, | Performed by: NURSE PRACTITIONER

## 2023-01-01 PROCEDURE — 25000003 PHARM REV CODE 250: Performed by: NURSE ANESTHETIST, CERTIFIED REGISTERED

## 2023-01-01 PROCEDURE — 87186 SC STD MICRODIL/AGAR DIL: CPT | Performed by: STUDENT IN AN ORGANIZED HEALTH CARE EDUCATION/TRAINING PROGRAM

## 2023-01-01 PROCEDURE — 86706 HEP B SURFACE ANTIBODY: CPT | Performed by: STUDENT IN AN ORGANIZED HEALTH CARE EDUCATION/TRAINING PROGRAM

## 2023-01-01 PROCEDURE — 87077 CULTURE AEROBIC IDENTIFY: CPT | Performed by: STUDENT IN AN ORGANIZED HEALTH CARE EDUCATION/TRAINING PROGRAM

## 2023-01-01 PROCEDURE — 5A1D70Z PERFORMANCE OF URINARY FILTRATION, INTERMITTENT, LESS THAN 6 HOURS PER DAY: ICD-10-PCS | Performed by: INTERNAL MEDICINE

## 2023-01-01 PROCEDURE — 25000003 PHARM REV CODE 250: Performed by: NURSE PRACTITIONER

## 2023-01-01 PROCEDURE — D9220A PRA ANESTHESIA: ICD-10-PCS | Mod: CRNA,,, | Performed by: NURSE ANESTHETIST, CERTIFIED REGISTERED

## 2023-01-01 PROCEDURE — 99232 SBSQ HOSP IP/OBS MODERATE 35: CPT | Mod: ,,,

## 2023-01-01 PROCEDURE — D9220A PRA ANESTHESIA: Mod: ANES,,, | Performed by: ANESTHESIOLOGY

## 2023-01-01 PROCEDURE — 90935 HEMODIALYSIS ONE EVALUATION: CPT

## 2023-01-01 PROCEDURE — 63600175 PHARM REV CODE 636 W HCPCS: Performed by: INTERNAL MEDICINE

## 2023-01-01 PROCEDURE — D9220A PRA ANESTHESIA: ICD-10-PCS | Mod: ANES,,, | Performed by: ANESTHESIOLOGY

## 2023-01-01 PROCEDURE — 87040 BLOOD CULTURE FOR BACTERIA: CPT | Performed by: STUDENT IN AN ORGANIZED HEALTH CARE EDUCATION/TRAINING PROGRAM

## 2023-01-01 RX ORDER — PANTOPRAZOLE SODIUM 40 MG/1
40 TABLET, DELAYED RELEASE ORAL DAILY
Status: DISCONTINUED | OUTPATIENT
Start: 2023-01-01 | End: 2024-01-05 | Stop reason: HOSPADM

## 2023-01-01 RX ORDER — DEXTROSE 40 %
15 GEL (GRAM) ORAL
Status: DISCONTINUED | OUTPATIENT
Start: 2023-01-01 | End: 2023-01-01

## 2023-01-01 RX ORDER — PREGABALIN 50 MG/1
50 CAPSULE ORAL DAILY
Status: DISCONTINUED | OUTPATIENT
Start: 2023-01-01 | End: 2024-01-05 | Stop reason: HOSPADM

## 2023-01-01 RX ORDER — INSULIN ASPART 100 [IU]/ML
5 INJECTION, SOLUTION INTRAVENOUS; SUBCUTANEOUS
Status: DISCONTINUED | OUTPATIENT
Start: 2023-01-01 | End: 2023-01-01

## 2023-01-01 RX ORDER — INSULIN ASPART 100 [IU]/ML
0-5 INJECTION, SOLUTION INTRAVENOUS; SUBCUTANEOUS
Status: DISCONTINUED | OUTPATIENT
Start: 2023-01-01 | End: 2023-01-01

## 2023-01-01 RX ORDER — ATORVASTATIN CALCIUM 40 MG/1
40 TABLET, FILM COATED ORAL DAILY
Status: DISCONTINUED | OUTPATIENT
Start: 2023-01-01 | End: 2024-01-05 | Stop reason: HOSPADM

## 2023-01-01 RX ORDER — INSULIN ASPART 100 [IU]/ML
4 INJECTION, SOLUTION INTRAVENOUS; SUBCUTANEOUS ONCE
Status: DISCONTINUED | OUTPATIENT
Start: 2023-01-01 | End: 2023-01-01

## 2023-01-01 RX ORDER — INSULIN ASPART 100 [IU]/ML
4 INJECTION, SOLUTION INTRAVENOUS; SUBCUTANEOUS
Status: DISCONTINUED | OUTPATIENT
Start: 2023-01-01 | End: 2023-01-01

## 2023-01-01 RX ORDER — HYDRALAZINE HYDROCHLORIDE 25 MG/1
25 TABLET, FILM COATED ORAL EVERY 8 HOURS
Status: DISCONTINUED | OUTPATIENT
Start: 2023-01-01 | End: 2024-01-01

## 2023-01-01 RX ORDER — IBUPROFEN 200 MG
16 TABLET ORAL
Status: DISCONTINUED | OUTPATIENT
Start: 2023-01-01 | End: 2024-01-05 | Stop reason: HOSPADM

## 2023-01-01 RX ORDER — ALBUTEROL SULFATE 2.5 MG/.5ML
2.5 SOLUTION RESPIRATORY (INHALATION) EVERY 4 HOURS
Status: DISCONTINUED | OUTPATIENT
Start: 2023-01-01 | End: 2024-01-05 | Stop reason: HOSPADM

## 2023-01-01 RX ORDER — INSULIN ASPART 100 [IU]/ML
7 INJECTION, SOLUTION INTRAVENOUS; SUBCUTANEOUS
Status: DISCONTINUED | OUTPATIENT
Start: 2023-01-01 | End: 2023-01-01

## 2023-01-01 RX ORDER — ETOMIDATE 2 MG/ML
INJECTION INTRAVENOUS
Status: DISCONTINUED | OUTPATIENT
Start: 2023-01-01 | End: 2023-01-01

## 2023-01-01 RX ORDER — LIDOCAINE HYDROCHLORIDE 20 MG/ML
SOLUTION OROPHARYNGEAL
Status: DISCONTINUED | OUTPATIENT
Start: 2023-01-01 | End: 2023-01-01

## 2023-01-01 RX ORDER — HEPARIN SODIUM 5000 [USP'U]/ML
3000 INJECTION, SOLUTION INTRAVENOUS; SUBCUTANEOUS DAILY PRN
Status: DISCONTINUED | OUTPATIENT
Start: 2023-01-01 | End: 2024-01-05 | Stop reason: HOSPADM

## 2023-01-01 RX ORDER — IPRATROPIUM BROMIDE AND ALBUTEROL SULFATE 2.5; .5 MG/3ML; MG/3ML
3 SOLUTION RESPIRATORY (INHALATION) EVERY 4 HOURS PRN
Status: DISCONTINUED | OUTPATIENT
Start: 2023-01-01 | End: 2024-01-05 | Stop reason: HOSPADM

## 2023-01-01 RX ORDER — CALCIUM ACETATE 667 MG/1
667 CAPSULE ORAL
Status: DISCONTINUED | OUTPATIENT
Start: 2023-01-01 | End: 2024-01-05 | Stop reason: HOSPADM

## 2023-01-01 RX ORDER — METOPROLOL SUCCINATE 50 MG/1
50 TABLET, EXTENDED RELEASE ORAL DAILY
Status: DISCONTINUED | OUTPATIENT
Start: 2023-01-01 | End: 2024-01-05 | Stop reason: HOSPADM

## 2023-01-01 RX ORDER — GLUCAGON 1 MG
1 KIT INJECTION
Status: DISCONTINUED | OUTPATIENT
Start: 2023-01-01 | End: 2023-01-01

## 2023-01-01 RX ORDER — PROMETHAZINE HYDROCHLORIDE 25 MG/1
25 TABLET ORAL EVERY 6 HOURS PRN
Status: DISCONTINUED | OUTPATIENT
Start: 2023-01-01 | End: 2024-01-05 | Stop reason: HOSPADM

## 2023-01-01 RX ORDER — GUAIFENESIN 100 MG/5ML
81 LIQUID (ML) ORAL DAILY
Status: DISCONTINUED | OUTPATIENT
Start: 2023-01-01 | End: 2024-01-05 | Stop reason: HOSPADM

## 2023-01-01 RX ORDER — INSULIN ASPART 100 [IU]/ML
6 INJECTION, SOLUTION INTRAVENOUS; SUBCUTANEOUS
Status: DISCONTINUED | OUTPATIENT
Start: 2023-01-01 | End: 2024-01-05 | Stop reason: HOSPADM

## 2023-01-01 RX ORDER — IPRATROPIUM BROMIDE AND ALBUTEROL SULFATE 2.5; .5 MG/3ML; MG/3ML
3 SOLUTION RESPIRATORY (INHALATION) EVERY 6 HOURS PRN
Status: DISCONTINUED | OUTPATIENT
Start: 2023-01-01 | End: 2023-01-01

## 2023-01-01 RX ORDER — HEPARIN SODIUM,PORCINE/D5W 25000/250
0-40 INTRAVENOUS SOLUTION INTRAVENOUS CONTINUOUS
Status: DISCONTINUED | OUTPATIENT
Start: 2023-01-01 | End: 2024-01-01

## 2023-01-01 RX ORDER — IBUPROFEN 200 MG
24 TABLET ORAL
Status: DISCONTINUED | OUTPATIENT
Start: 2023-01-01 | End: 2024-01-05 | Stop reason: HOSPADM

## 2023-01-01 RX ORDER — INSULIN ASPART 100 [IU]/ML
0-10 INJECTION, SOLUTION INTRAVENOUS; SUBCUTANEOUS
Status: DISCONTINUED | OUTPATIENT
Start: 2023-01-01 | End: 2023-01-01

## 2023-01-01 RX ORDER — SODIUM CHLORIDE 0.9 % (FLUSH) 0.9 %
10 SYRINGE (ML) INJECTION
Status: DISCONTINUED | OUTPATIENT
Start: 2023-01-01 | End: 2024-01-01 | Stop reason: HOSPADM

## 2023-01-01 RX ORDER — HYDRALAZINE HYDROCHLORIDE 20 MG/ML
10 INJECTION INTRAMUSCULAR; INTRAVENOUS EVERY 8 HOURS PRN
Status: DISCONTINUED | OUTPATIENT
Start: 2023-01-01 | End: 2024-01-05 | Stop reason: HOSPADM

## 2023-01-01 RX ORDER — FUROSEMIDE 80 MG/1
80 TABLET ORAL DAILY
Status: DISCONTINUED | OUTPATIENT
Start: 2023-01-01 | End: 2024-01-05 | Stop reason: HOSPADM

## 2023-01-01 RX ORDER — BENZONATATE 100 MG/1
200 CAPSULE ORAL EVERY 8 HOURS PRN
Status: DISCONTINUED | OUTPATIENT
Start: 2023-01-01 | End: 2024-01-05 | Stop reason: HOSPADM

## 2023-01-01 RX ORDER — DEXTROSE 50 % IN WATER (D50W) INTRAVENOUS SYRINGE
50
Status: DISCONTINUED | OUTPATIENT
Start: 2023-01-01 | End: 2024-01-05 | Stop reason: HOSPADM

## 2023-01-01 RX ORDER — ALBUTEROL SULFATE 2.5 MG/.5ML
2.5 SOLUTION RESPIRATORY (INHALATION) EVERY 4 HOURS
Status: DISCONTINUED | OUTPATIENT
Start: 2023-01-01 | End: 2023-01-01

## 2023-01-01 RX ORDER — TALC
3 POWDER (GRAM) TOPICAL NIGHTLY PRN
Status: DISCONTINUED | OUTPATIENT
Start: 2023-01-01 | End: 2024-01-05 | Stop reason: HOSPADM

## 2023-01-01 RX ORDER — HEPARIN SODIUM 1000 [USP'U]/ML
1000 INJECTION, SOLUTION INTRAVENOUS; SUBCUTANEOUS
Status: DISCONTINUED | OUTPATIENT
Start: 2023-01-01 | End: 2024-01-05 | Stop reason: HOSPADM

## 2023-01-01 RX ORDER — PROPOFOL 10 MG/ML
VIAL (ML) INTRAVENOUS
Status: DISCONTINUED | OUTPATIENT
Start: 2023-01-01 | End: 2023-01-01

## 2023-01-01 RX ORDER — HEPARIN SODIUM,PORCINE/D5W 25000/250
0-40 INTRAVENOUS SOLUTION INTRAVENOUS CONTINUOUS
Status: DISCONTINUED | OUTPATIENT
Start: 2023-01-01 | End: 2023-01-01

## 2023-01-01 RX ORDER — RIFAMPIN 300 MG/1
300 CAPSULE ORAL DAILY
Status: DISCONTINUED | OUTPATIENT
Start: 2023-01-01 | End: 2023-01-01

## 2023-01-01 RX ORDER — GLUCAGON 1 MG
1 KIT INJECTION
Status: DISCONTINUED | OUTPATIENT
Start: 2023-01-01 | End: 2024-01-05 | Stop reason: HOSPADM

## 2023-01-01 RX ORDER — INSULIN ASPART 100 [IU]/ML
0-5 INJECTION, SOLUTION INTRAVENOUS; SUBCUTANEOUS
Status: DISCONTINUED | OUTPATIENT
Start: 2023-01-01 | End: 2024-01-05 | Stop reason: HOSPADM

## 2023-01-01 RX ORDER — FUROSEMIDE 20 MG/1
20 TABLET ORAL DAILY
Status: DISCONTINUED | OUTPATIENT
Start: 2023-01-01 | End: 2023-01-01

## 2023-01-01 RX ORDER — DEXMEDETOMIDINE HYDROCHLORIDE 100 UG/ML
INJECTION, SOLUTION INTRAVENOUS
Status: DISCONTINUED | OUTPATIENT
Start: 2023-01-01 | End: 2023-01-01

## 2023-01-01 RX ADMIN — ALBUTEROL SULFATE 2.5 MG: 2.5 SOLUTION RESPIRATORY (INHALATION) at 11:12

## 2023-01-01 RX ADMIN — ALBUTEROL SULFATE 2.5 MG: 2.5 SOLUTION RESPIRATORY (INHALATION) at 12:12

## 2023-01-01 RX ADMIN — SACUBITRIL AND VALSARTAN 1 TABLET: 24; 26 TABLET, FILM COATED ORAL at 08:12

## 2023-01-01 RX ADMIN — HYDRALAZINE HYDROCHLORIDE 25 MG: 25 TABLET ORAL at 09:12

## 2023-01-01 RX ADMIN — METOPROLOL SUCCINATE 50 MG: 50 TABLET, EXTENDED RELEASE ORAL at 08:12

## 2023-01-01 RX ADMIN — ASPIRIN 81 MG CHEWABLE TABLET 81 MG: 81 TABLET CHEWABLE at 12:12

## 2023-01-01 RX ADMIN — ATORVASTATIN CALCIUM 40 MG: 40 TABLET, FILM COATED ORAL at 08:12

## 2023-01-01 RX ADMIN — ASPIRIN 81 MG CHEWABLE TABLET 81 MG: 81 TABLET CHEWABLE at 08:12

## 2023-01-01 RX ADMIN — DOCUSATE SODIUM 50 MG: 50 CAPSULE, LIQUID FILLED ORAL at 08:12

## 2023-01-01 RX ADMIN — DEXMEDETOMIDINE 4 MCG: 100 INJECTION, SOLUTION, CONCENTRATE INTRAVENOUS at 01:12

## 2023-01-01 RX ADMIN — ALBUTEROL SULFATE 2.5 MG: 2.5 SOLUTION RESPIRATORY (INHALATION) at 08:12

## 2023-01-01 RX ADMIN — CALCIUM ACETATE 667 MG: 667 CAPSULE ORAL at 05:12

## 2023-01-01 RX ADMIN — METOPROLOL SUCCINATE 50 MG: 50 TABLET, EXTENDED RELEASE ORAL at 12:12

## 2023-01-01 RX ADMIN — INSULIN ASPART 8 UNITS: 100 INJECTION, SOLUTION INTRAVENOUS; SUBCUTANEOUS at 08:12

## 2023-01-01 RX ADMIN — FUROSEMIDE 20 MG: 20 TABLET ORAL at 08:12

## 2023-01-01 RX ADMIN — CEFEPIME 1 G: 1 INJECTION, POWDER, FOR SOLUTION INTRAMUSCULAR; INTRAVENOUS at 01:12

## 2023-01-01 RX ADMIN — HEPARIN SODIUM AND DEXTROSE 21 UNITS/KG/HR: 10000; 5 INJECTION INTRAVENOUS at 08:12

## 2023-01-01 RX ADMIN — HEPARIN SODIUM AND DEXTROSE 21 UNITS/KG/HR: 10000; 5 INJECTION INTRAVENOUS at 02:12

## 2023-01-01 RX ADMIN — HYDRALAZINE HYDROCHLORIDE 25 MG: 25 TABLET ORAL at 06:12

## 2023-01-01 RX ADMIN — PREGABALIN 50 MG: 50 CAPSULE ORAL at 04:12

## 2023-01-01 RX ADMIN — INSULIN HUMAN 8 UNITS: 100 INJECTION, SOLUTION PARENTERAL at 08:12

## 2023-01-01 RX ADMIN — ALBUTEROL SULFATE 2.5 MG: 2.5 SOLUTION RESPIRATORY (INHALATION) at 07:12

## 2023-01-01 RX ADMIN — PREGABALIN 50 MG: 50 CAPSULE ORAL at 06:12

## 2023-01-01 RX ADMIN — SODIUM CHLORIDE: 0.9 INJECTION, SOLUTION INTRAVENOUS at 01:12

## 2023-01-01 RX ADMIN — GLYCOPYRROLATE 0.2 MG: 0.2 INJECTION, SOLUTION INTRAMUSCULAR; INTRAVENOUS at 01:12

## 2023-01-01 RX ADMIN — CALCIUM ACETATE 667 MG: 667 CAPSULE ORAL at 08:12

## 2023-01-01 RX ADMIN — PANTOPRAZOLE SODIUM 40 MG: 40 TABLET, DELAYED RELEASE ORAL at 12:12

## 2023-01-01 RX ADMIN — DAPTOMYCIN 470 MG: 350 INJECTION, POWDER, LYOPHILIZED, FOR SOLUTION INTRAVENOUS at 09:12

## 2023-01-01 RX ADMIN — INSULIN ASPART 4 UNITS: 100 INJECTION, SOLUTION INTRAVENOUS; SUBCUTANEOUS at 12:12

## 2023-01-01 RX ADMIN — INSULIN ASPART 6 UNITS: 100 INJECTION, SOLUTION INTRAVENOUS; SUBCUTANEOUS at 05:12

## 2023-01-01 RX ADMIN — PROMETHAZINE HYDROCHLORIDE 25 MG: 25 TABLET ORAL at 05:12

## 2023-01-01 RX ADMIN — INSULIN ASPART 7 UNITS: 100 INJECTION, SOLUTION INTRAVENOUS; SUBCUTANEOUS at 07:12

## 2023-01-01 RX ADMIN — HYDRALAZINE HYDROCHLORIDE 25 MG: 25 TABLET ORAL at 03:12

## 2023-01-01 RX ADMIN — CEFTAROLINE FOSAMIL 200 MG: 400 POWDER, FOR SOLUTION INTRAVENOUS at 05:12

## 2023-01-01 RX ADMIN — ALBUTEROL SULFATE 2.5 MG: 2.5 SOLUTION RESPIRATORY (INHALATION) at 03:12

## 2023-01-01 RX ADMIN — CALCIUM ACETATE 667 MG: 667 CAPSULE ORAL at 12:12

## 2023-01-01 RX ADMIN — INSULIN DETEMIR 4 UNITS: 100 INJECTION, SOLUTION SUBCUTANEOUS at 10:12

## 2023-01-01 RX ADMIN — INSULIN ASPART 2 UNITS: 100 INJECTION, SOLUTION INTRAVENOUS; SUBCUTANEOUS at 01:12

## 2023-01-01 RX ADMIN — HEPARIN SODIUM AND DEXTROSE 12 UNITS/KG/HR: 10000; 5 INJECTION INTRAVENOUS at 01:12

## 2023-01-01 RX ADMIN — INSULIN DETEMIR 20 UNITS: 100 INJECTION, SOLUTION SUBCUTANEOUS at 08:12

## 2023-01-01 RX ADMIN — INSULIN ASPART 6 UNITS: 100 INJECTION, SOLUTION INTRAVENOUS; SUBCUTANEOUS at 09:12

## 2023-01-01 RX ADMIN — DAPTOMYCIN 785 MG: 500 INJECTION, POWDER, LYOPHILIZED, FOR SOLUTION INTRAVENOUS at 10:12

## 2023-01-01 RX ADMIN — PANTOPRAZOLE SODIUM 40 MG: 40 TABLET, DELAYED RELEASE ORAL at 08:12

## 2023-01-01 RX ADMIN — ATORVASTATIN CALCIUM 40 MG: 40 TABLET, FILM COATED ORAL at 12:12

## 2023-01-01 RX ADMIN — INSULIN ASPART 6 UNITS: 100 INJECTION, SOLUTION INTRAVENOUS; SUBCUTANEOUS at 12:12

## 2023-01-01 RX ADMIN — HEPARIN SODIUM AND DEXTROSE 18 UNITS/KG/HR: 10000; 5 INJECTION INTRAVENOUS at 09:12

## 2023-01-01 RX ADMIN — SACUBITRIL AND VALSARTAN 1 TABLET: 24; 26 TABLET, FILM COATED ORAL at 12:12

## 2023-01-01 RX ADMIN — CEFEPIME 1 G: 1 INJECTION, POWDER, FOR SOLUTION INTRAMUSCULAR; INTRAVENOUS at 03:12

## 2023-01-01 RX ADMIN — DOCUSATE SODIUM 50 MG: 50 CAPSULE, LIQUID FILLED ORAL at 03:12

## 2023-01-01 RX ADMIN — HEPARIN SODIUM 1000 UNITS: 1000 INJECTION, SOLUTION INTRAVENOUS; SUBCUTANEOUS at 11:12

## 2023-01-01 RX ADMIN — FUROSEMIDE 80 MG: 80 TABLET ORAL at 12:12

## 2023-01-01 RX ADMIN — Medication 50 ML: at 08:12

## 2023-01-01 RX ADMIN — ALBUTEROL SULFATE 2.5 MG: 2.5 SOLUTION RESPIRATORY (INHALATION) at 05:12

## 2023-01-01 RX ADMIN — CALCIUM ACETATE 667 MG: 667 CAPSULE ORAL at 04:12

## 2023-01-01 RX ADMIN — INSULIN ASPART 7 UNITS: 100 INJECTION, SOLUTION INTRAVENOUS; SUBCUTANEOUS at 04:12

## 2023-01-01 RX ADMIN — ETOMIDATE 4 MG: 2 INJECTION, SOLUTION INTRAVENOUS at 02:12

## 2023-01-01 RX ADMIN — SACUBITRIL AND VALSARTAN 1 TABLET: 24; 26 TABLET, FILM COATED ORAL at 09:12

## 2023-01-01 RX ADMIN — HYDRALAZINE HYDROCHLORIDE 25 MG: 25 TABLET ORAL at 05:12

## 2023-01-01 RX ADMIN — INSULIN ASPART 4 UNITS: 100 INJECTION, SOLUTION INTRAVENOUS; SUBCUTANEOUS at 04:12

## 2023-01-01 RX ADMIN — ALBUTEROL SULFATE 2.5 MG: 2.5 SOLUTION RESPIRATORY (INHALATION) at 09:12

## 2023-01-01 RX ADMIN — ERYTHROPOIETIN 7900 UNITS: 10000 INJECTION, SOLUTION INTRAVENOUS; SUBCUTANEOUS at 11:12

## 2023-01-01 RX ADMIN — HYDRALAZINE HYDROCHLORIDE 25 MG: 25 TABLET ORAL at 02:12

## 2023-01-01 RX ADMIN — INSULIN ASPART 4 UNITS: 100 INJECTION, SOLUTION INTRAVENOUS; SUBCUTANEOUS at 11:12

## 2023-01-01 RX ADMIN — ALBUTEROL SULFATE 2.5 MG: 2.5 SOLUTION RESPIRATORY (INHALATION) at 04:12

## 2023-01-01 RX ADMIN — FUROSEMIDE 80 MG: 80 TABLET ORAL at 08:12

## 2023-01-01 RX ADMIN — HEPARIN SODIUM AND DEXTROSE 21 UNITS/KG/HR: 10000; 5 INJECTION INTRAVENOUS at 06:12

## 2023-01-01 RX ADMIN — INSULIN ASPART 5 UNITS: 100 INJECTION, SOLUTION INTRAVENOUS; SUBCUTANEOUS at 05:12

## 2023-01-01 RX ADMIN — PROPOFOL 20 MG: 10 INJECTION, EMULSION INTRAVENOUS at 02:12

## 2023-01-01 RX ADMIN — LIDOCAINE HYDROCHLORIDE 5 ML: 20 SOLUTION ORAL at 01:12

## 2023-01-01 RX ADMIN — HEPARIN SODIUM AND DEXTROSE 23 UNITS/KG/HR: 10000; 5 INJECTION INTRAVENOUS at 04:12

## 2023-01-01 RX ADMIN — PREGABALIN 50 MG: 50 CAPSULE ORAL at 05:12

## 2023-09-05 PROBLEM — I50.22 CHRONIC SYSTOLIC HEART FAILURE: Status: ACTIVE | Noted: 2021-09-27

## 2023-12-28 PROBLEM — R78.81 BACTEREMIA: Status: ACTIVE | Noted: 2023-01-01

## 2023-12-28 NOTE — Clinical Note
A rotating dilator sheath was inserted over the RV lead. The rotating dilator sheath was advanced over lead.    11fr Tightrail Sub C

## 2023-12-28 NOTE — Clinical Note
The laser sheath was inserted over LV lead. The laser sheath was advanced over lead using counter traction methods. 16 Fr Laser.

## 2023-12-28 NOTE — PLAN OF CARE
Outside Transfer Acceptance Note / Regional Referral Center    Referring facility: [place], [place]   Referring provider: JOHNIE QURESHI MAHMOUD  Accepting facility: [facility]  Accepting provider: LAM VENTURA  Admitting provider: LUIS  Reason for transfer: Higher Level of Care   Transfer diagnosis: Endocarditis, bacteremia, vegetation of BiV ICD leads  Transfer specialty requested: Electrophysiology  Transfer specialty notified: Yes  Transfer level: NUMBER 1-5: 2  Bed type requested: Med-tele  Isolation status: No active isolations   Admission class or status: IP- Inpatient      Narrative     58F T1DM c/b gastroparesis, retinopathy and nephropathy - ESRD on HD, HTN, HLD, CAD status post prior PCI with ischemic cardiomyopathy status post AICD placement, chronic thrombocytopenia, severe mitral regurgitation status post MitraClip, history of fungemia/bacteremia initially presenting to [facility] ER on 12/18 with a complaints of right-sided chest pain associated with nausea and vomiting multiple episodes for the last 2 days. She also reported low-grade temperature. Patient reports generalized weakness. Cardiology performed [facility] on 12/20/2023 for evaluation of this chest pain with recommendation for medical management for coronary artery disease. Course is then c/b refractory MRSA bacteremia. HM team initially suspected source of bacteremia was a tunneled dialysis catheter. This was removed upon admission and patient had placement of left IJ. The placement of left IJ trialysis catheter was c/b IJ thrombosis for which she is started on provoked DVT management - currently with lovenox though with ESRD would consider transition to heparin. IR had difficulty placing the right IJ due to thrombosis as well and access subsequently was placed on the right femoral. Due to persistent bacteremia despite vancomycin, patient subsequently had JAVY has  findings positive for vegetations present on the lead wire. As per cardiologist, this cannot be managed at [facility] and would need transfer to higher level of care. EP at [facility] is in agreement with transfer. Patient's current medications include:    Current medications:  aspirin 81 mg Oral Daily   atorvastatin 40 mg Oral Nightly   cefepime 1 g Intravenous Q24H   DAPTOmycin 6 mg/kg Intravenous Q48H since 12/26  enoxaparin 1 mg/kg (Ideal) Subcutaneous Nightly   epoetin john 100 Units/kg Subcutaneous Once per day on Tuesday Thursday Saturday   hydrALAZINE 25 mg Oral TID   insulin glargine 10 Units Subcutaneous Nightly   insulin lispro 0-10 Units Subcutaneous AC & HS   ipratropium-albuteroL 3 mL Nebulization Q6H   metoprolol succinate XL 50 mg Oral Daily   pantoprazole 40 mg Oral QAM   pregabalin 50 mg Oral Daily since 12/27  rifAMPin 300 mg Oral Daily   sacubitriL-valsartan 1 tablet Oral BID   vancomycin 15 mg/kg Intravenous since 12/18    Objective     Vitals: 153/78, 88, 99.5, 20, 95% RA  Recent Labs: All pertinent labs within the past 24 hours have been reviewed.  Recent imaging: See above  Airway:     Vent settings:         IV access:    Infusions: None  Allergies: Review of patient's allergies indicates:  No Known Allergies   NPO: No    Anticoagulation:   Anticoagulants       None             Instructions      Wilder Hayes-  Admit to Hospital Medicine  Upon patient arrival to floor, please send SecureChat to [facility] or call extension [phone] (if no answer, do NOT leave a callback number after the beep, rather please send a SecureChat to [facility]), for Hospital Medicine admit team assignment and for additional admit orders for the patient.  Do not page the attending physician associated with the patient on arrival (this physician may not be on duty at the time of arrival).  Rather, always send a SecureChat to [facility] or call [phone] to reach the triage physician for orders and team assignment.

## 2023-12-28 NOTE — Clinical Note
Dr. Alvarez paged to the EP lab to consult with Dr. Sanchez regarding the vegetation on the lead to be extracted. Dr. Santiago consulting with Dr. Mclain.

## 2023-12-28 NOTE — Clinical Note
The laser sheath unit was calibrated. The laser sheath was inserted over RV lead. Laser sheath usage began at 1/4/2024 5:38 PM.    The laser sheath was advanced over lead using counter traction methods. Med Visi sheath advanced with 14fr Laser Sheath over RV lead. Settings are Fluence: 60, Pulses: 80

## 2023-12-28 NOTE — Clinical Note
The chronic RA lead           The lead locking device was attached to the existing lead and counter traction maneuvers were applied. LLD 2 attempted

## 2023-12-28 NOTE — Clinical Note
A rotating dilator sheath was inserted over the LV lead. The rotating dilator sheath was advanced over lead and removed from lead.    11fr Tighrail Sub C.

## 2023-12-28 NOTE — Clinical Note
The laser sheath unit was calibrated. The laser sheath was inserted over RV lead. The laser sheath was advanced over lead using counter traction methods. 16fr Laser sheath inserted over RV lead. Large Visi sheath advanced with laser sheath.

## 2023-12-28 NOTE — Clinical Note
The chronic RA lead           The lead locking device was attached to the existing lead and counter traction maneuvers were applied. Wendyd EZ attempted

## 2023-12-28 NOTE — Clinical Note
There was an existing generator. The generator was removed. The leads were disconnected. The generator was returned to . The generator was returned due to infection

## 2023-12-28 NOTE — Clinical Note
The pericardiocentesis catheter was inserted and pericardial tap was performed under US guidance in the pericardial space. There were 200 mL of fluid removed.

## 2023-12-28 NOTE — Clinical Note
The laser sheath was inserted over RA lead. The laser sheath was advanced over lead using counter traction methods. 14fr Laser

## 2023-12-28 NOTE — Clinical Note
A generator pocket was revised at the left chest with blunt dissection, electrocautery, plasma blade and sharp dissection.

## 2023-12-28 NOTE — Clinical Note
The chronic LV lead           The lead locking device was attached to the existing lead and counter traction maneuvers were applied. LLDE lead locking device attempted

## 2023-12-28 NOTE — Clinical Note
The laser sheath was inserted over RV lead. The laser sheath was advanced over lead using counter traction methods. 16fr Laser.

## 2023-12-28 NOTE — Clinical Note
The chronic RV lead           The lead locking device was attached to the existing lead and counter traction maneuvers were applied. ANTONIOD EZ

## 2023-12-29 PROBLEM — E83.9 CHRONIC KIDNEY DISEASE-MINERAL AND BONE DISORDER: Status: ACTIVE | Noted: 2023-01-01

## 2023-12-29 PROBLEM — B95.62 MRSA BACTEREMIA: Status: ACTIVE | Noted: 2023-01-01

## 2023-12-29 PROBLEM — N18.5 ANEMIA DUE TO STAGE 5 CHRONIC KIDNEY DISEASE, NOT ON CHRONIC DIALYSIS: Status: ACTIVE | Noted: 2023-01-01

## 2023-12-29 PROBLEM — D63.1 ANEMIA DUE TO STAGE 5 CHRONIC KIDNEY DISEASE, NOT ON CHRONIC DIALYSIS: Status: ACTIVE | Noted: 2023-01-01

## 2023-12-29 PROBLEM — E13.9 LADA (LATENT AUTOIMMUNE DIABETES IN ADULTS), MANAGED AS TYPE 1: Status: ACTIVE | Noted: 2023-01-01

## 2023-12-29 PROBLEM — M89.9 CHRONIC KIDNEY DISEASE-MINERAL AND BONE DISORDER: Status: ACTIVE | Noted: 2023-01-01

## 2023-12-29 PROBLEM — N18.9 CHRONIC KIDNEY DISEASE-MINERAL AND BONE DISORDER: Status: ACTIVE | Noted: 2023-01-01

## 2023-12-29 PROBLEM — T82.7XXA INFECTION ASSOCIATED WITH CARDIAC DEVICE: Status: ACTIVE | Noted: 2023-01-01

## 2023-12-29 NOTE — TRANSFER OF CARE
"Anesthesia Transfer of Care Note    Patient: Ellne Roman    Procedure(s) Performed: Procedure(s) (LRB):  Transesophageal echo (JAVY) intra-procedure log documentation (N/A)    Patient location: PACU    Anesthesia Type: general    Transport from OR: Transported from OR on 2-3 L/min O2 by NC with adequate spontaneous ventilation    Post pain: adequate analgesia    Post assessment: no apparent anesthetic complications and tolerated procedure well    Post vital signs: stable    Level of consciousness: awake and alert    Nausea/Vomiting: no nausea/vomiting    Complications: none    Transfer of care protocol was followed      Last vitals: Visit Vitals  BP (!) 149/70   Pulse 90   Temp 37.2 °C (98.9 °F) (Oral)   Resp 18   Ht 5' 7" (1.702 m)   Wt 78.5 kg (173 lb)   LMP  (LMP Unknown)   SpO2 (!) 93%   BMI 27.10 kg/m²     "

## 2023-12-29 NOTE — HPI
"  GASTROENTEROLOGY CONSULTATION       Site of Visit:   [address]     Provider:   Harsha Montero MD    PCP:   Abbe Chu    Chief Complaint:    Abdominal pain    Assessment:  This is a 26-year-old who back in 2017 developed C. difficile colitis.  Soon after he was diagnosed with POTS syndrome.  He began having difficulty with nausea vomiting cyclically.  He also developed IBS after multiple episodes C. difficile which required multiple fecal transplantation as well as Zinplava eventually which resolved it.  He did take vancomycin with antibiotics up until the time he came to [place].  However most recent course of Keflex a few months ago they refused to prescribe him vancomycin.  He is currently doing okay.  He is here to establish care.    Recommendations:   Amitriptyline 10 mg p.o. nightly  Stop trazodone  Blood work to include TSH and CMP    History of Present Illness:   This is a 26-year-old who has a complex gastroenterology history.  He tells me that things started back in 2017 when he contracted C. difficile.  Not sure how he got this he thinks he may have got it from someone.  Developed appendicitis after this.  Underwent appendectomy.  He was treated and then eventually ended up developing POTS syndrome.  He ended up getting ablated for atrial fibrillation.  However he also had some autonomic instability.  Was also diagnosed with \"Gastroparesis\" had a 90-minute study which was done in the setting of vomiting syndrome which does not sound like gastroparesis.  The patient has following history: Stomach was slow diagnosed.  Most days its fine.  Vomits a few times a month, he gets nauseated.  Then he runs to bathroom, sometimes two or three times.  Sometimes in goes on for hours.   Goes and gets fluids after one of these events.  Within the past year twice.  Takes Zofran if nausea gets bad.  Times has to go in for IV fluids and IV Zofran..    During evaluation for abdominal pain was found to have a " Ellen Roman is a 58yoF with a hx of T1DM c/b gastroparesis, retinopathy, and nephropathy, now with ESRD on HD, HTN, HLD, CAD s/p PCI with iCM s/p AICD, chronic thrombocytopenia, severe MR s/p MitraClip, hx of fungemia/bacteremia here as a transfer from OSH with MRSA bacteremia and ICD lead vegetations (outside JAVY). Her tunneled RIJ dialysis catheter was removed while persistently bacteremic (later found clot in RIJ) and a subsequent LIJ trialysis was removed due to clotting of the LIJ. JAVY at the outside hospital reported relatively large vegetations on the ICD lead wire traversing the tricuspid valve. We have a disc which is pending upload to the system, but on review of still images (limited quality) with echo staff, it appears there is an approximately 1-1.5cm vegetation possibly involving the tricuspid valve and in the region of the RV lead crossing the valve. Unable to confirm attachment with these images. The patient's device is a 2018 CRT-D, [employer].    EP consulted to evaluate for lead extraction.   large gallbladder polyp on ultrasound and underwent cholecystectomy.  After cholecystectomy developed right upper quadrant abdominal pain and worse diarrhea.  Imaging studies have not shown any abnormalities.  He has some slightly elevated liver enzymes which were ultimately diagnosed as Walker given the fact that fatty liver on imaging.    C.difficile 4-5 times - Zinplava.  Stool transplant via colonoscopy x2.       IBS- Diarrhea after heavy meals.  If he has a fatty meal after finishes.  Sometime hes still has to run.  He does ok with light food.  No idea how he got this.      He uses trazodone for sleep  Medical Cannibas  5 mg THC in Gummies.    POTS syndrome diagnosed 4 years.     Uses midodrine and metoprolol  Recurrent sinus infections- doing ok after he took that.  His apartment black mold.      Loperamide as needed.  If the colestid.    Cholestryamine was changed to do Colestid.      2-3 years ago had a gallbladder removed.   Gets worse with.  Post-cholestyramine.        No family hx of GI problems    Appendicitis-  2017   C.diff May of 2017      Ablation afib.        FL ESOPHAGRAM BARIUM WITH AIR CONTRAST  DATE OF EXAM: 6/27/2022 8:14 AM.  CLINICAL INFORMATION: Odynophagia  FINDINGS: Double contrast esophagram performed.  Esophagus is normal in course and caliber. Normal esophageal motility. No  mass, stricture, or ulceration. Normal esophageal motility. No evidence of  spontaneous gastroesophageal reflux during the course of the examination  No hiatal hernia.  IMPRESSION: Unremarkable double contrast esophagram.      Maddy at [facility]:   Pt was originally seen for non-healing gastric ulcers on EGD 02/2018. This was repeated several times and was negative for H. Pylori. H. Pylori treated empirically in 07/2018 and repeat EGD 07/2018 finally showed healed ulcers. 05/2017 had C. Difficile treated with metronidazole. 06/2017 colonoscopy unremarkable. 08/2019 colonoscopy done for rectal bleeding showed internal  hemorrhoids. History of gallbladder polyp and s/p cholecystectomy for the same 09/2019. 10/02/2019 positive for C. Difficile again and treated with vancomycin. 03/06/2020 C. Difficile positive that responded to vancomycin taper but symptoms returned after stopping. 07/2020 underwent FMT. Underwent repeat FMT 12/30/2020 (Dr. Aldana). 09/2021 hospitalized for colitis from C. Difficile infection (treated by time of colonoscopy)-completed 2 weeks of hydrocortisone enemas. 11/30/2021 received a dose of Zinplava.     He also had known elevated LFTs. Saw hepatology 12/2019. Fibroscan and serology workup consistent with NAFLD and no further follow up necessary, per Dr. Zuniga. He was diagnosed with A fib earlier this year. Does not have WPW. Being seen at [facility].   PLAN:   History of C. Difficile:   -Increase Questran to 3/4 packet twice daily (1 bid too constipating historically)  -Can use hyoscyamine up to 4 times daily as needed for abdominal pain  -Avoid NSAIDs (ibuprofen, Motrin, Advil, naproxen, Aleve, Naprosyn)  -Recommend judicious use of antibiotics in the future given history of C diff              Current Outpatient Medications   Medication Sig Dispense Refill     colestipol (COLESTID) 1 g tablet Take 1 tablet (1 g) by mouth 2 times daily 60 tablet 3     eptinezumab-jjmr (VYEPTI) 100 MG/ML Inject 100 mg into the vein       hyoscyamine SL (LEVSIN/SL) 0.125 MG sublingual tablet Place 0.125 mg under the tongue       Magnesium Oxide 250 MG TABS ONE BY MOUTH TWICE DAILY TAKE WITH FOOD.       medical cannabis (Patient's own supply) See Admin Instructions (The purpose of this order is to document that the patient reports taking medical cannabis.  This is not a prescription, and is not used to certify that the patient has a qualifying medical condition.)       metoprolol tartrate (LOPRESSOR) 25 MG tablet 25 mg qAM and 12.5 mg qPM       midodrine (PROAMATINE) 5 MG tablet Take 5 mg by mouth       pantoprazole  (PROTONIX) 40 MG EC tablet Take 40 mg by mouth 2 times daily       Riboflavin 400 MG TABS Take 400 mg by mouth       traZODone (DESYREL) 50 MG tablet Take 1 tablet by mouth At Bedtime       acetaminophen (TYLENOL) 500 MG tablet Take 1,000 mg by mouth       albuterol (PROAIR HFA/PROVENTIL HFA/VENTOLIN HFA) 108 (90 Base) MCG/ACT inhaler Inhale 2 puffs into the lungs every 6 hours 18 g 3     azelastine (ASTELIN) 0.1 % nasal spray Spray 1 spray into both nostrils 2 times daily 30 mL 0     benzonatate (TESSALON) 100 MG capsule Take 1 capsule (100 mg) by mouth 3 times daily as needed for cough 30 capsule 0     cephALEXin (KEFLEX) 500 MG capsule Take 1 capsule (500 mg) by mouth 3 times daily (Patient not taking: Reported on 4/17/2023) 30 capsule 0     cyproheptadine (PERIACTIN) 4 MG tablet Take 1 tablet by mouth (Patient not taking: Reported on 4/17/2023)       diphenhydrAMINE (BENADRYL) 25 MG capsule Take 1 capsule by mouth       eletriptan (RELPAX) 20 MG tablet TAKE ONE TAB AT ONSET OF HEADACHE. MAY REPEAT IN 2HRS X 1 AS NEEDED. MAX 2 TABS IN 24 HRS.       flecainide (TAMBOCOR) 100 MG tablet Take 100 mg by mouth       guaiFENesin (MUCINEX) 600 MG 12 hr tablet Take 1,200 mg by mouth       ibuprofen (ADVIL/MOTRIN) 200 MG capsule Take 200 mg by mouth every 4 hours as needed for fever       lidocaine, viscous, (XYLOCAINE) 2 % solution Take 15 mLs by mouth       loperamide (IMODIUM A-D) 2 MG tablet Take 2 mg by mouth       mirtazapine (REMERON) 15 MG tablet Take 7.5 mg by mouth (Patient not taking: Reported on 11/30/2022)       ondansetron (ZOFRAN ODT) 4 MG ODT tab Take 1 tablet (4 mg) by mouth every 8 hours as needed for nausea 20 tablet 0     pantoprazole (PROTONIX) 40 MG EC tablet Take 40 mg by mouth daily       promethazine (PHENERGAN) 12.5 MG tablet Take 1 tablet by mouth         Past Surgical History:   Procedure Laterality Date     APPENDECTOMY       CHOLECYSTECTOMY       tonsilctomy         Past Medical History:    Diagnosis Date     Gastroesophageal reflux disease      Heart disease      Uncomplicated asthma        Family History   Problem Relation Age of Onset     Uterine Cancer Sister      Asthma Brother      Atrial fibrillation Maternal Grandmother      Asthma Paternal Grandmother         reports that he has never smoked. He has never used smokeless tobacco. He reports current alcohol use. He reports that he does not currently use drugs.         Physical Exam:   /87 (BP Location: Right arm, Patient Position: Sitting, Cuff Size: Adult Regular)   Pulse 93   Resp 16   Wt 72.5 kg (159 lb 12.8 oz)   SpO2 97%   BMI 27.43 kg/m    Wt:   Wt Readings from Last 2 Encounters:   04/17/23 72.5 kg (159 lb 12.8 oz)   02/06/23 68 kg (150 lb)      Constitutional: cooperative, pleasant, not dyspneic/diaphoretic, no acute distress  Eyes: Sclera anicteric/injected  Ears/nose/mouth/throat: Normal oropharynx without ulcers or exudate, mucus membranes moist, hearing intact  Neck: supple, thyroid normal size  Cardiac: Regular rate and rhythm no murmurs rubs or gallops.  CV: No edema  Respiratory: Unlabored breathing, no wheezes rales or ronchi  Lymph: No axillary, submandibular, supraclavicular or inguinal lymphadenopathy  Abd: Nondistended, +bs, no hepatosplenomegaly, nontender, no peritoneal signs  Skin: warm, perfused, no jaundice  Neuro: AAO x 3, No asterixis  Psych: Normal affect  MSK: Normal gait    Wt Readings from Last 2 Encounters:   04/17/23 72.5 kg (159 lb 12.8 oz)   02/06/23 68 kg (150 lb)

## 2023-12-29 NOTE — SUBJECTIVE & OBJECTIVE
Past Medical History:   Diagnosis Date    Acute hepatitis A     Acute kidney injury 03/07/2018    Acute respiratory failure with hypoxemia 03/13/2020    Arthritis     Cataract     No date or laterality given    Chronic systolic CHF (congestive heart failure), NYHA class 3     Coronary artery disease     Diabetes mellitus     Dialysis patient     Dilated cardiomyopathy     Diverticulosis of intestine 10/02/2013    Gastroparesis 02/24/2014    Hypertension     ICD (implantable cardioverter-defibrillator), biventricular, in situ     Ischemic cardiomyopathy 04/29/2018    Left bundle branch block 04/29/2018    Microcytic hypochromic anemia 03/07/2018    Migraine 01/24/2016    Presence of drug coated stent in LAD coronary artery     A SHU stent was placed to the mid D#2 in 7-2016, and then into the ostium of the D#2 in 1-2018, and then to the prox-mid D#2 in 3-2017    Presence of drug coated stent in left circumflex coronary artery 01/20/2017    A SHU stent was placed to the proximal OM 1 branch in January of 2017    Severe mitral regurgitation 04/12/2018       Past Surgical History:   Procedure Laterality Date    ABLATION OF ARRHYTHMOGENIC FOCUS FOR SUPRAVENTRICULAR TACHYCARDIA WITH ELECTROPHYSIOLOGY STUDY N/A 10/06/2021    Procedure: Ablation SVT;  Surgeon: Joby Gutiérrez MD;  Location: [facility] EP LAB;  Service: Cardiology;  Laterality: N/A;    ANGIOGRAM, CORONARY, WITH LEFT HEART CATHETERIZATION  04/05/2022    Procedure: Angiogram, Coronary, with Left Heart Cath;  Surgeon: Manav Schneider MD;  Location: [facility] CATH LAB;  Service: Cardiology;;    BREAST BIOPSY Left     BREAST SURGERY Left     left biopsy    CARDIAC CATHETERIZATION      CARDIAC DEFIBRILLATOR PLACEMENT      CARDIAC ELECTROPHYSIOLOGY STUDY WITH DRUG CHALLENGE  10/06/2021    Procedure: EP Drug challenge;  Surgeon: Joby Gutiérrez MD;  Location: [facility] EP LAB;  Service: Cardiology;;    CATHETERIZATION OF BOTH LEFT AND RIGHT HEART N/A 12/10/2019    Procedure:  CATHETERIZATION, HEART, BOTH LEFT AND RIGHT;  Surgeon: LUIS ALFREDO Ernandez MD;  Location: [facility] CATH LAB;  Service: Cardiology;  Laterality: N/A;    TONSILLECTOMY      TRANSESOPHAGEAL ECHOCARDIOGRAPHY  07/11/2016    TRANSESOPHAGEAL ECHOCARDIOGRAPHY N/A 01/10/2022    Procedure: ECHOCARDIOGRAM, TRANSESOPHAGEAL;  Surgeon: Leslie Fishman MD;  Location: [facility]"" CATH LAB;  Service: Cardiology;  Laterality: N/A;    TRANSESOPHAGEAL ECHOCARDIOGRAPHY N/A 8/17/2022    Procedure: ECHOCARDIOGRAM, TRANSESOPHAGEAL;  Surgeon: Manav Schneider MD;  Location: [facility]"" CATH LAB;  Service: Cardiology;  Laterality: N/A;    TUBAL LIGATION      VASCULAR SURGERY         Review of patient's allergies indicates:  No Known Allergies    Current Facility-Administered Medications on File Prior to Encounter   Medication    0.9%  NaCl infusion (for blood administration)    0.9%  NaCl infusion (for blood administration)    benzocaine 20 % oral spray    sodium chloride 0.9% flush 10 mL     Current Outpatient Medications on File Prior to Encounter   Medication Sig    alirocumab (PRALUENT PEN) 150 mg/mL PnIj Inject 1 mL (150 mg total) into the skin every 14 (fourteen) days.    atorvastatin (LIPITOR) 40 MG tablet Take 1 tablet by mouth nightly.    calcitRIOL (ROCALTROL) 0.25 MCG Cap Take 1 capsule by mouth once daily.    cloNIDine (CATAPRES) 0.3 MG tablet Take 1 tablet by mouth 2 (two) times daily.    clopidogreL (PLAVIX) 75 mg tablet Take 1 tablet by mouth once daily.    diphenhydrAMINE (BENADRYL) 25 mg capsule Take 1 capsule (25 mg total) by mouth nightly as needed for Itching.    doxazosin (CARDURA) 8 MG Tab Take 1 tablet by mouth nightly.    ENTRESTO  mg per tablet TAKE 1 TABLET(S) BY MOUTH TWO TIMES A DAY    famotidine (PEPCID) 20 MG tablet Take 1 tablet by mouth once daily.    furosemide (LASIX) 20 MG tablet Take 1 tablet by mouth once daily.    hydrALAZINE (APRESOLINE) 50 MG tablet Take 1 tablet by mouth 2 (two) times a day (morning and  before bed).    metoprolol succinate (TOPROL-XL) 100 MG 24 hr tablet Take 1 tablet by mouth once daily.    metroNIDAZOLE (FLAGYL) 500 MG tablet Take 1 tablet in the morning and 1 tablet before bedtime for 7 days.    NIFEdipine (PROCARDIA-XL) 60 MG (OSM) 24 hr tablet Take 1 tablet by mouth once daily.    ondansetron (ZOFRAN-ODT) 4 MG TbDL Take 1 tablet by mouth.    ondansetron (ZOFRAN-ODT) 4 MG TbDL Take 1 tablet by mouth every 8 (eight) hours as needed for up to 7 days.    pantoprazole (PROTONIX) 40 MG tablet Take 1 tablet by mouth once daily.    prochlorperazine (COMPAZINE) 10 MG tablet Take 1 tablet by mouth every 6 (six) hours as needed for up to 7 days.    promethazine (PHENERGAN) 25 MG tablet Take 1 tablet by mouth 4 (four) times daily as needed for nausea for up to 7 days    thiamine (VITAMIN B-1) 100 MG tablet Take 1 tablet (100 mg total) by mouth once daily.    vancomycin (VANCOCIN) 125 MG capsule Take 1 capsule by mouth in the morning and 1capsule by mouth at noon and 1 capsule by mouth in the evening and 1 capsule by mouth before bedtime for 10 days.    amoxicillin-clavulanate 500-125mg (AUGMENTIN) 500-125 mg Tab Take 1 tablet by mouth every morning for 2 days    blood sugar diagnostic (TRUE METRIX GLUCOSE TEST STRIP MISC) True Metrix Glucose Test Strip   USE TO TEST BLOOD SUGAR 3 TIMES DAILY    blood sugar diagnostic Strp Use to test blood sugar 3 (three) times daily before meals.    blood-glucose meter Misc Use as directed    blood-glucose meter Misc Use to test blood sugar as directed    blood-glucose sensor (DEXCOM G7 SENSOR) Gini Apply one to skin as directed and change q 10 days.    bumetanide (BUMEX) 2 MG tablet Take 1 tablet by mouth once daily.    calcitRIOL (ROCALTROL) 0.25 MCG Cap Take 1 capsule by mouth once daily.    calcium acetate,phosphat bind, (PHOSLO) 667 mg tablet Take 1 tablet by mouth in the morning and 1 tablet at noon and 1 tablet in the evening with meals.    epoetin john (PROCRIT)  "20,000 unit/mL injection Inject 1 ml under the skin every two weeks. Administer only if hemoglobin is <10.5.    insulin glargine 100 units/mL SubQ pen Inject 30 Units into the skin nightly.    insulin lispro 100 unit/mL injection Inject 5 Units into the skin 3 (three) times daily with meals. Discard vial 28 days after opening.    insulin lispro 100 unit/mL injection Inject 0-20 Units into the skin 4 (four) times daily before meals and nightly. If BS : 0 units, -200: 4 units, -250: 8 units; -300: 12 units, -350: 16 units, -400: 20 units, BS > 400: 20 units and call your doctor    insulin lispro protamin-lispro (HUMALOG MIX 50-50 KWIKPEN) 100 unit/mL (50-50) InPn Inject 10 Units into the skin 3 (three) times daily before meals.    metoclopramide HCl (REGLAN) 10 MG tablet Take 1 tablet by mouth 4 (four) times daily before meals and nightly.    nitroGLYCERIN (NITROSTAT) 0.4 MG SL tablet Place 1 tablet under the tongue every 5 (five) minutes as needed for chest pain.  Max of 3 tablets in a 15 minute period.    venlafaxine (EFFEXOR-XR) 37.5 MG 24 hr capsule Take 1 capsule by mouth daily    [DISCONTINUED] aspirin 81 MG Chew Take 81 mg by mouth.    [DISCONTINUED] folic acid (FOLVITE) 1 MG tablet Take 5 tablets by mouth daily for 10 days, THEN take 1 tablet daily for 80 days    [DISCONTINUED] insulin syringe-needle U-100 1 mL 31 gauge x 15/64" Syrg Inject 1 Syringe into the skin once daily.    [DISCONTINUED] lancets (ONETOUCH ULTRASOFT LANCETS) Misc Use 1 lancet to check blood sugar 4 (four) times daily.    [DISCONTINUED] losartan (COZAAR) 100 MG tablet Take 1 tablet  by mouth nightly for blood pressure.     Family History       Problem Relation (Age of Onset)    Colon cancer Father    Heart disease Mother    Hypertension Mother, Brother, Brother    Kidney cancer Sister    Leukemia Sister          Tobacco Use    Smoking status: Never    Smokeless tobacco: Never   Substance and Sexual " Activity    Alcohol use: Not Currently    Drug use: Never    Sexual activity: Yes     Partners: Male     Review of Systems   Constitutional: Negative for diaphoresis and fever.   Cardiovascular:  Negative for chest pain, dyspnea on exertion, leg swelling, near-syncope, orthopnea, palpitations, paroxysmal nocturnal dyspnea and syncope.   Respiratory:  Negative for cough and shortness of breath.    Gastrointestinal:  Negative for abdominal pain, diarrhea, nausea and vomiting.   Neurological:  Negative for light-headedness.   Psychiatric/Behavioral:  Negative for altered mental status and substance abuse.      Objective:     Vital Signs (Most Recent):  Temp: 98.2 °F (36.8 °C) (12/29/23 0753)  Pulse: 88 (12/29/23 1107)  Resp: 20 (12/29/23 0820)  BP: (!) 149/69 (12/29/23 0753)  SpO2: 95 % (12/29/23 0820) Vital Signs (24h Range):  Temp:  [98.1 °F (36.7 °C)-98.2 °F (36.8 °C)] 98.2 °F (36.8 °C)  Pulse:  [72-93] 88  Resp:  [16-20] 20  SpO2:  [91 %-99 %] 95 %  BP: (149-184)/(69-83) 149/69       Weight: 78.5 kg (173 lb)  Body mass index is 27.1 kg/m².    SpO2: 95 %        Physical Exam  Constitutional:       Appearance: Normal appearance.   Cardiovascular:      Rate and Rhythm: Normal rate and regular rhythm.      Pulses: Normal pulses.      Heart sounds: Normal heart sounds.   Pulmonary:      Effort: Pulmonary effort is normal.      Breath sounds: Normal breath sounds. No wheezing or rales.   Abdominal:      General: Abdomen is flat. There is no distension.      Palpations: Abdomen is soft.      Tenderness: There is no abdominal tenderness.   Musculoskeletal:      Right lower leg: No edema.      Left lower leg: No edema.   Skin:     General: Skin is warm and dry.   Neurological:      Mental Status: She is alert and oriented to person, place, and time. Mental status is at baseline.   Psychiatric:         Mood and Affect: Mood normal.         Behavior: Behavior normal.            Significant Labs: All pertinent lab results from  the last 24 hours have been reviewed.    Significant Imaging:  Reviewed

## 2023-12-29 NOTE — ASSESSMENT & PLAN NOTE
MRSA on OSH Bcx since 12/18. On daptomycin 6mg/kg Q48hr and cefepime at OSH  Trialysis line removed due to concern for source  12/27 JAVY concern for vegetation reported  Consult ID  Consult EP

## 2023-12-29 NOTE — HPI
"A 58-year-old woman with DM1, diabetic gastroparesis, diabetic retinopathy, diabetic nephropathy, ESRD on HD (MWF), CAD, HTN, s/p AICD, s/p mitral clip due to severe MR, chronic thrombocytopenia, and recent C tropicalis fungemia with positive HD catheter for K pneumoniae (October) who was transferred from [facility] for further management in the setting of refractory MRSA bacteremia and ACID lead vegetation.     Of note, Mrs. Roman was seen in [facility] ED on 12/18 at which time samples were collected for culture and she was subsequently discharged Blood cultures collected on 12/8 became positive for MRSA and C perfringens. She returned to [facility] on 12/18 at which time she was admitted for further management. Cultures collected from 12/18-12/27 are positive for MRSA. During her admission at [facility] she was diagnosed and treated for left IJ DVT, LUE cellulitis, DM1. Work up also revealed evidence of a large vegetation attached to the AICD lead across the TV.     Infectious Diseases consulted for "Bacteremia with concern for ICD lead infection. Transferred on daptomycin and cefepime. "    "

## 2023-12-29 NOTE — CONSULTS
[facility] - Cardiology Stepdown  Endocrinology  Diabetes Consult Note    Consult Requested by: Yamilet Boyce MD   Reason for admit: Bacteremia    HISTORY OF PRESENT ILLNESS:  Reason for Consult: Management of VANESSA (managed as T1DM), Hyperglycemia     Diabetes diagnosis year: 2008 (likely had issues with decreasing pancreatic function prior)    Home Diabetes Medications:  Lantus 30 units nightly; Humalog 5 units with meals + SSI (ISF 1:10)    How often checking glucose at home? >4 x day (Dexcom G6)  BG readings on regimen: 100-150's  Hypoglycemia on the regimen?  No  Missed doses on regimen?  No    Diabetes Complications include:     Hyperglycemia, Hypoglycemia , Hypoglycemia unawareness, Diabetic chronic kidney disease     , Diabetic retinopathy , Diabetic cataract , and Diabetic peripheral neuropathy     Complicating diabetes co morbidities:   HTN; HLD; CAD; ESRD on HD      HPI: Ellen Roman is a 57 yo female with hx of DM1, HTN, HLD, CAD s/p PCI with AICD placement, Mitral Regurgitation s/p clip, and ESRD on iHD MWF who presents as a transfer from OSH for higher level of care.  According to records, patient initially presented to OSH on 12/18 with chief complaint of R sided chest pain associated with N/V x 2 days with low grade temp.  Cardiology evaluated there and performed a LCH on 12/20 but no PCI was performed and they recommended medical management for her CAD.  Her BC grew MRSA bacteremia that remained positive during the admission with source suspected to be from her RIJ Tunneled dialysis catheter. The TDC was removed and IR was consulted for tempoary line placement and she was found to have a thrombosed LIJ from her previous catheter and a temporary catheter had to be placed to the R femoral vein.  Her BC continued to remain positive despite treatment with Vancomycin and a JAVY was ordered with findings of vegetations to the AICD lead wire and she was then transferred to [facility] for higher level of care.  Endocrine consulted to manage type 1 diabetes. Of note, chart has mixed information regarding T1DM vs. T2DM. Suppressed C-peptide noted at [facility], but cannot find antibody labs to confirm VANESSA vs. Pancreatic DM.      Lab Results   Component Value Date    HGBA1C 8.0 (H) 12/19/2023             Interval HPI:   Overnight events: No acute events overnight. Patient in room 322/322 A. Blood glucose stable. BG above goal on current insulin regimen (SSI, prandial, and basal insulin ). Steroid use- None.    Renal function- Abnormal - Creatinine 4.1   Vasopressors-  None       Endocrine will continue to follow and manage insulin orders inpatient.         Diet NPO     Eating:   NPO  Nausea: No  Hypoglycemia and intervention: No  Fever: No  TPN and/or TF: No    PMH, PSH, FH, SH updated and reviewed     ROS:  Review of Systems   Constitutional:  Negative for unexpected weight change.   Eyes:  Negative for visual disturbance.   Respiratory:  Positive for cough.    Cardiovascular:  Positive for chest pain.   Gastrointestinal:  Negative for nausea and vomiting.   Endocrine: Negative for polydipsia and polyuria.   Musculoskeletal:  Negative for back pain.   Skin:  Negative for rash.   Neurological:  Negative for syncope.   Psychiatric/Behavioral:  Negative for agitation and dysphoric mood.        Current Medications and/or Treatments Impacting Glycemic Control  Immunotherapy:    Immunosuppressants       None          Steroids:   Hormones (From admission, onward)      Start     Stop Route Frequency Ordered    12/29/23 0020  melatonin tablet 3 mg         -- Oral Nightly PRN 12/28/23 2331          Pressors:    Autonomic Drugs (From admission, onward)      None          Hyperglycemia/Diabetes Medications:   Antihyperglycemics (From admission, onward)      Start     Stop Route Frequency Ordered    12/29/23 1145  insulin aspart U-100 pen 7 Units         -- SubQ 3 times daily with meals 12/29/23 1133     "12/29/23 0907  insulin aspart U-100 pen 0-10 Units         -- SubQ Before meals & nightly PRN 12/29/23 0807    12/29/23 0900  insulin detemir U-100 (Levemir) pen 20 Units         -- SubQ Daily 12/28/23 2331             PHYSICAL EXAMINATION:  Vitals:    12/29/23 1130   BP:    Pulse: 85   Resp:    Temp:      Body mass index is 27.1 kg/m².     Physical Exam  Constitutional:       General: She is not in acute distress.     Appearance: Normal appearance. She is not ill-appearing.   HENT:      Head: Normocephalic and atraumatic.      Right Ear: External ear normal.      Left Ear: External ear normal.      Nose: Nose normal.   Cardiovascular:      Rate and Rhythm: Normal rate and regular rhythm.      Heart sounds: No murmur heard.  Pulmonary:      Effort: Pulmonary effort is normal. No respiratory distress.   Abdominal:      General: Bowel sounds are normal. There is no distension.      Tenderness: There is no abdominal tenderness.   Musculoskeletal:         General: No swelling.      Right lower leg: No edema.      Left lower leg: No edema.   Skin:     Findings: No erythema.   Neurological:      General: No focal deficit present.      Mental Status: She is alert and oriented to person, place, and time.   Psychiatric:         Mood and Affect: Mood normal.         Behavior: Behavior normal.              Labs Reviewed and Include   Recent Labs   Lab 12/29/23  0004   *  309*   CALCIUM 7.6*  7.6*   ALBUMIN 2.2*  2.2*   PROT 5.9*   *  135*   K 4.1  4.1   CO2 26  26   CL 97  97   BUN 21*  21*   CREATININE 4.1*  4.1*   ALKPHOS 60   ALT 8*   AST 8*   BILITOT 0.4     Lab Results   Component Value Date    WBC 5.13 12/29/2023    HGB 7.9 (L) 12/29/2023    HCT 25.1 (L) 12/29/2023    MCV 93 12/29/2023     (L) 12/29/2023     No results for input(s): "TSH", "FREET4" in the last 168 hours.  Lab Results   Component Value Date    HGBA1C 8.0 (H) 12/19/2023       Nutritional status:   Body mass index is 27.1 " "kg/m².  Lab Results   Component Value Date    ALBUMIN 2.2 (L) 12/29/2023    ALBUMIN 2.2 (L) 12/29/2023    ALBUMIN 2.9 (L) 12/11/2023     No results found for: "PREALBUMIN"    Estimated Creatinine Clearance: 16.3 mL/min (A) (based on SCr of 4.1 mg/dL (H)).    Accu-Checks  Recent Labs     12/29/23  0740   POCTGLUCOSE 342*        ASSESSMENT and PLAN    Renal/  Chronic kidney disease-mineral and bone disorder  Titrate insulin slowly to avoid hypoglycemia as the risk of hypoglycemia increases with decreased creatinine clearance.  Estimated Creatinine Clearance: 16.3 mL/min (A) (based on SCr of 4.1 mg/dL (H)).        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   BG goal 140-180    Reviewed CGM data, patient seems well controlled at home  20% reduction in Levemir dosing will convert to BID dosing given T1DM- status.  - Levemir (Insulin Detemir) 12 units BID  - Novolog (Insulin Aspart) 7 units TIDWM and prn for BG excursions MDC SSI (150/25) (Converted patient's scheduled and SSI into a regimen we can accommodate in the hospital, her SSI at home is much more aggressive then we administer inpatient).   - BG checks AC/HS  - Hypoglycemia protocol in place  - If blood glucose greater than 300, please ask patient not to eat food or drink anything other than water until correctional insulin has brought it back below 250    ** Please notify Endocrine for any change and/or advance in diet**  ** Please call Endocrine for any BG related issues **    Discharge Planning:   TBD. Please notify endocrinology prior to discharge.        Plan discussed with patient, family, and RN at bedside.     Otilio Montes De Oca, DNP, FNP  Endocrinology  [facility] - Cardiology Stepdown  "

## 2023-12-29 NOTE — CONSULTS
[facility] - Cardiology Stepdown  Cardiac Electrophysiology  Consult Note    Admission Date: 12/28/2023  Code Status: Prior   Attending Provider: Yamilet Boyce MD  Consulting Provider: Connor M Gillies, MD  Principal Problem:Bacteremia    Inpatient consult to Electrophysiology  Consult performed by: Gillies, Connor M, MD  Consult ordered by: Bhupendra Kumar MD        Subjective:     Chief Complaint:  Infective endocarditis     HPI:   Ellen Roman is a 58yoF with a hx of T1DM c/b gastroparesis, retinopathy, and nephropathy, now with ESRD on HD, HTN, HLD, CAD s/p PCI with iCM s/p AICD, chronic thrombocytopenia, severe MR s/p MitraClip, hx of fungemia/bacteremia here as a transfer from OSH with MRSA bacteremia and ICD lead vegetations (outside JAVY). Her tunneled RIJ dialysis catheter was removed while persistently bacteremic (later found clot in RIJ) and a subsequent LIJ trialysis was removed due to clotting of the LIJ. J[facility]Y at the outside hospital reported relatively large vegetations on the ICD lead wire traversing the tricuspid valve. We have a disc which is pending upload to the system, but on review of still images (limited quality) with echo staff, it appears there is an approximately 1-1.5cm vegetation possibly involving the tricuspid valve and in the region of the RV lead crossing the valve. Unable to confirm attachment with these images. The patient's device is a 2018 CRT-D, [employer].    EP consulted to evaluate for lead extraction.    Past Medical History:   Diagnosis Date    Acute hepatitis A     Acute kidney injury 03/07/2018    Acute respiratory failure with hypoxemia 03/13/2020    Arthritis     Cataract     No date or laterality given    Chronic systolic CHF (congestive heart failure), NYHA class 3     Coronary artery disease     Diabetes mellitus     Dialysis patient     Dilated cardiomyopathy     Diverticulosis of intestine 10/02/2013    Gastroparesis 02/24/2014    Hypertension     ICD  (implantable cardioverter-defibrillator), biventricular, in situ     Ischemic cardiomyopathy 04/29/2018    Left bundle branch block 04/29/2018    Microcytic hypochromic anemia 03/07/2018    Migraine 01/24/2016    Presence of drug coated stent in LAD coronary artery     A SHU stent was placed to the mid D#2 in 7-2016, and then into the ostium of the D#2 in 1-2018, and then to the prox-mid D#2 in 3-2017    Presence of drug coated stent in left circumflex coronary artery 01/20/2017    A SHU stent was placed to the proximal OM 1 branch in January of 2017    Severe mitral regurgitation 04/12/2018       Past Surgical History:   Procedure Laterality Date    ABLATION OF ARRHYTHMOGENIC FOCUS FOR SUPRAVENTRICULAR TACHYCARDIA WITH ELECTROPHYSIOLOGY STUDY N/A 10/06/2021    Procedure: Ablation SVT;  Surgeon: Joby Gutiérrez MD;  Location: [facility] EP LAB;  Service: Cardiology;  Laterality: N/A;    ANGIOGRAM, CORONARY, WITH LEFT HEART CATHETERIZATION  04/05/2022    Procedure: Angiogram, Coronary, with Left Heart Cath;  Surgeon: Manav Schneider MD;  Location: [facility] CATH LAB;  Service: Cardiology;;    BREAST BIOPSY Left     BREAST SURGERY Left     left biopsy    CARDIAC CATHETERIZATION      CARDIAC DEFIBRILLATOR PLACEMENT      CARDIAC ELECTROPHYSIOLOGY STUDY WITH DRUG CHALLENGE  10/06/2021    Procedure: EP Drug challenge;  Surgeon: Joby Gutiérrez MD;  Location: [facility] EP LAB;  Service: Cardiology;;    CATHETERIZATION OF BOTH LEFT AND RIGHT HEART N/A 12/10/2019    Procedure: CATHETERIZATION, HEART, BOTH LEFT AND RIGHT;  Surgeon: LUIS ALFREDO Ernandez MD;  Location: [facility] CATH LAB;  Service: Cardiology;  Laterality: N/A;    TONSILLECTOMY      TRANSESOPHAGEAL ECHOCARDIOGRAPHY  07/11/2016    TRANSESOPHAGEAL ECHOCARDIOGRAPHY N/A 01/10/2022    Procedure: ECHOCARDIOGRAM, TRANSESOPHAGEAL;  Surgeon: Leslie Fishman MD;  Location: [facility] CATH LAB;  Service: Cardiology;  Laterality: N/A;    TRANSESOPHAGEAL ECHOCARDIOGRAPHY N/A 8/17/2022    Procedure:  ECHOCARDIOGRAM, TRANSESOPHAGEAL;  Surgeon: Manav Schneider MD;  Location: [facility] CATH LAB;  Service: Cardiology;  Laterality: N/A;    TUBAL LIGATION      VASCULAR SURGERY         Review of patient's allergies indicates:  No Known Allergies    Current Facility-Administered Medications on File Prior to Encounter   Medication    0.9%  NaCl infusion (for blood administration)    0.9%  NaCl infusion (for blood administration)    benzocaine 20 % oral spray    sodium chloride 0.9% flush 10 mL     Current Outpatient Medications on File Prior to Encounter   Medication Sig    alirocumab (PRALUENT PEN) 150 mg/mL PnIj Inject 1 mL (150 mg total) into the skin every 14 (fourteen) days.    atorvastatin (LIPITOR) 40 MG tablet Take 1 tablet by mouth nightly.    calcitRIOL (ROCALTROL) 0.25 MCG Cap Take 1 capsule by mouth once daily.    cloNIDine (CATAPRES) 0.3 MG tablet Take 1 tablet by mouth 2 (two) times daily.    clopidogreL (PLAVIX) 75 mg tablet Take 1 tablet by mouth once daily.    diphenhydrAMINE (BENADRYL) 25 mg capsule Take 1 capsule (25 mg total) by mouth nightly as needed for Itching.    doxazosin (CARDURA) 8 MG Tab Take 1 tablet by mouth nightly.    ENTRESTO  mg per tablet TAKE 1 TABLET(S) BY MOUTH TWO TIMES A DAY    famotidine (PEPCID) 20 MG tablet Take 1 tablet by mouth once daily.    furosemide (LASIX) 20 MG tablet Take 1 tablet by mouth once daily.    hydrALAZINE (APRESOLINE) 50 MG tablet Take 1 tablet by mouth 2 (two) times a day (morning and before bed).    metoprolol succinate (TOPROL-XL) 100 MG 24 hr tablet Take 1 tablet by mouth once daily.    metroNIDAZOLE (FLAGYL) 500 MG tablet Take 1 tablet in the morning and 1 tablet before bedtime for 7 days.    NIFEdipine (PROCARDIA-XL) 60 MG (OSM) 24 hr tablet Take 1 tablet by mouth once daily.    ondansetron (ZOFRAN-ODT) 4 MG TbDL Take 1 tablet by mouth.    ondansetron (ZOFRAN-ODT) 4 MG TbDL Take 1 tablet by mouth every 8 (eight) hours as needed for up to 7 days.     pantoprazole (PROTONIX) 40 MG tablet Take 1 tablet by mouth once daily.    prochlorperazine (COMPAZINE) 10 MG tablet Take 1 tablet by mouth every 6 (six) hours as needed for up to 7 days.    promethazine (PHENERGAN) 25 MG tablet Take 1 tablet by mouth 4 (four) times daily as needed for nausea for up to 7 days    thiamine (VITAMIN B-1) 100 MG tablet Take 1 tablet (100 mg total) by mouth once daily.    vancomycin (VANCOCIN) 125 MG capsule Take 1 capsule by mouth in the morning and 1capsule by mouth at noon and 1 capsule by mouth in the evening and 1 capsule by mouth before bedtime for 10 days.    amoxicillin-clavulanate 500-125mg (AUGMENTIN) 500-125 mg Tab Take 1 tablet by mouth every morning for 2 days    blood sugar diagnostic (TRUE METRIX GLUCOSE TEST STRIP MISC) True Metrix Glucose Test Strip   USE TO TEST BLOOD SUGAR 3 TIMES DAILY    blood sugar diagnostic Strp Use to test blood sugar 3 (three) times daily before meals.    blood-glucose meter Misc Use as directed    blood-glucose meter Misc Use to test blood sugar as directed    blood-glucose sensor (DEXCOM G7 SENSOR) Gini Apply one to skin as directed and change q 10 days.    bumetanide (BUMEX) 2 MG tablet Take 1 tablet by mouth once daily.    calcitRIOL (ROCALTROL) 0.25 MCG Cap Take 1 capsule by mouth once daily.    calcium acetate,phosphat bind, (PHOSLO) 667 mg tablet Take 1 tablet by mouth in the morning and 1 tablet at noon and 1 tablet in the evening with meals.    epoetin john (PROCRIT) 20,000 unit/mL injection Inject 1 ml under the skin every two weeks. Administer only if hemoglobin is <10.5.    insulin glargine 100 units/mL SubQ pen Inject 30 Units into the skin nightly.    insulin lispro 100 unit/mL injection Inject 5 Units into the skin 3 (three) times daily with meals. Discard vial 28 days after opening.    insulin lispro 100 unit/mL injection Inject 0-20 Units into the skin 4 (four) times daily before meals and nightly. If BS : 0 units, BS  "151-200: 4 units, -250: 8 units; -300: 12 units, -350: 16 units, -400: 20 units, BS > 400: 20 units and call your doctor    insulin lispro protamin-lispro (HUMALOG MIX 50-50 KWIKPEN) 100 unit/mL (50-50) InPn Inject 10 Units into the skin 3 (three) times daily before meals.    metoclopramide HCl (REGLAN) 10 MG tablet Take 1 tablet by mouth 4 (four) times daily before meals and nightly.    nitroGLYCERIN (NITROSTAT) 0.4 MG SL tablet Place 1 tablet under the tongue every 5 (five) minutes as needed for chest pain.  Max of 3 tablets in a 15 minute period.    venlafaxine (EFFEXOR-XR) 37.5 MG 24 hr capsule Take 1 capsule by mouth daily    [DISCONTINUED] aspirin 81 MG Chew Take 81 mg by mouth.    [DISCONTINUED] folic acid (FOLVITE) 1 MG tablet Take 5 tablets by mouth daily for 10 days, THEN take 1 tablet daily for 80 days    [DISCONTINUED] insulin syringe-needle U-100 1 mL 31 gauge x 15/64" Syrg Inject 1 Syringe into the skin once daily.    [DISCONTINUED] lancets (ONETOUCH ULTRASOFT LANCETS) Misc Use 1 lancet to check blood sugar 4 (four) times daily.    [DISCONTINUED] losartan (COZAAR) 100 MG tablet Take 1 tablet  by mouth nightly for blood pressure.     Family History       Problem Relation (Age of Onset)    Colon cancer Father    Heart disease Mother    Hypertension Mother, Brother, Brother    Kidney cancer Sister    Leukemia Sister          Tobacco Use    Smoking status: Never    Smokeless tobacco: Never   Substance and Sexual Activity    Alcohol use: Not Currently    Drug use: Never    Sexual activity: Yes     Partners: Male     Review of Systems   Constitutional: Negative for diaphoresis and fever.   Cardiovascular:  Negative for chest pain, dyspnea on exertion, leg swelling, near-syncope, orthopnea, palpitations, paroxysmal nocturnal dyspnea and syncope.   Respiratory:  Negative for cough and shortness of breath.    Gastrointestinal:  Negative for abdominal pain, diarrhea, nausea and vomiting. "   Neurological:  Negative for light-headedness.   Psychiatric/Behavioral:  Negative for altered mental status and substance abuse.      Objective:     Vital Signs (Most Recent):  Temp: 98.2 °F (36.8 °C) (12/29/23 0753)  Pulse: 88 (12/29/23 1107)  Resp: 20 (12/29/23 0820)  BP: (!) 149/69 (12/29/23 0753)  SpO2: 95 % (12/29/23 0820) Vital Signs (24h Range):  Temp:  [98.1 °F (36.7 °C)-98.2 °F (36.8 °C)] 98.2 °F (36.8 °C)  Pulse:  [72-93] 88  Resp:  [16-20] 20  SpO2:  [91 %-99 %] 95 %  BP: (149-184)/(69-83) 149/69       Weight: 78.5 kg (173 lb)  Body mass index is 27.1 kg/m².    SpO2: 95 %        Physical Exam  Constitutional:       Appearance: Normal appearance.   Cardiovascular:      Rate and Rhythm: Normal rate and regular rhythm.      Pulses: Normal pulses.      Heart sounds: Normal heart sounds.   Pulmonary:      Effort: Pulmonary effort is normal.      Breath sounds: Normal breath sounds. No wheezing or rales.   Abdominal:      General: Abdomen is flat. There is no distension.      Palpations: Abdomen is soft.      Tenderness: There is no abdominal tenderness.   Musculoskeletal:      Right lower leg: No edema.      Left lower leg: No edema.   Skin:     General: Skin is warm and dry.   Neurological:      Mental Status: She is alert and oriented to person, place, and time. Mental status is at baseline.   Psychiatric:         Mood and Affect: Mood normal.         Behavior: Behavior normal.            Significant Labs: All pertinent lab results from the last 24 hours have been reviewed.    Significant Imaging:  Reviewed            Assessment and Plan:     * Bacteremia  #CRT-D in place  #Infection associated with cardiac device  58yoF with T1DM, ESRD on HD, Cad s/p PCI, iCM with prior EF 25-30% recovered to 55-60% with CRT-D in place (dual-coiled 2018 [employer] CRT-D), severe MR s/p MitraClip, and chronic thrombocytopenia is here as a transfer with bacteremia, vegetation involving her RV lead per outside JAVY.  Unable to get good visualization with current imaging. Discussed with echo staff regarding need for further imaging lead extraction.     Recommendations:  - JAVY today  - Surface echo as well  - We will plan on lead extraction next week pending imaging  - Formal device interrogation today to evaluate pacemaker dependency, age/type of leads        Thank you for your consult. I will follow-up with patient. Please contact us if you have any additional questions.    Connor M Gillies, MD  Cardiac Electrophysiology  [facility] - Cardiology Stepdown

## 2023-12-29 NOTE — ASSESSMENT & PLAN NOTE
- S/P PCI D2 with SHU x 1 (07/13/2016)  - S/P PCI OM1 with SHU x 1 (01/04/2017)  - S/P PCI prox D2 with SHU x 1 (03/07/2017)  - Patent stents with NCCAD by [facility] 12/10/2019  - S/P [facility] with RCA 90 % stenosis, SHU x 3 (4/5/2022)    Most recent C during admission. Continue aspirin, heparin drip.

## 2023-12-29 NOTE — HPI
Ellen Roman is 58 year old female with PMHx significant for T1DM c/b gastroparesis, retinopathy and nephropathy - ESRD on HD, HTN, HLD, CAD s/p prior PCI with ischemic cardiomyopathy s/p AICD placement, chronic thrombocytopenia, severe mitral regurgitation status post MitraClip, history of fungemia/bacteremia initially presenting to [facility] ER on 12/18 with a complaints of right-sided chest pain associated with nausea and vomiting multiple episodes for the prior 2 days.     She also reported low-grade temperature. Patient reports generalized weakness. Cardiology performed LHC on 12/20/2023 for evaluation of this chest pain with recommendation for medical management for coronary artery disease. Course is then complicated by refractory MRSA bacteremia. HM team initially suspected source of bacteremia was a tunneled dialysis catheter. This was removed upon admission and patient had placement of left IJ. The placement of left IJ trialysis catheter was c/b IJ thrombosis for which she is started on provoked DVT management - currently with lovenox though with ESRD would consider transition to heparin. IR had difficulty placing the right IJ due to thrombosis as well and access subsequently was placed on the right femoral. Due to persistent bacteremia despite vancomycin, patient subsequently had JAVY 12/27/23 with findings reported as positive for vegetations present on the ICD lead wire. As per OSH cardiologist, requested transfer from [facility] to [facility] [place]. 2018 CRT-D [employer].     Current medications:  aspirin 81 mg Oral Daily   atorvastatin 40 mg Oral Nightly   cefepime 1 g Intravenous Q24H   DAPTOmycin 6 mg/kg Intravenous Q48H since 12/26  enoxaparin 1 mg/kg (Ideal) Subcutaneous Nightly   epoetin john 100 Units/kg Subcutaneous Once per day on Tuesday Thursday Saturday   hydrALAZINE 25 mg Oral TID   insulin glargine 10 Units Subcutaneous Nightly   insulin lispro 0-10 Units Subcutaneous AC & HS   ipratropium-albuteroL 3 mL  Nebulization Q6H   metoprolol succinate XL 50 mg Oral Daily   pantoprazole 40 mg Oral QAM   pregabalin 50 mg Oral Daily since 12/27  rifAMPin 300 mg Oral Daily   sacubitriL-valsartan 1 tablet Oral BID   vancomycin 15 mg/kg Intravenous since 12/18

## 2023-12-29 NOTE — SUBJECTIVE & OBJECTIVE
Interval HPI:   Overnight events: No acute events overnight. Patient in room 322/322 A. Blood glucose stable. BG above goal on current insulin regimen (SSI, prandial, and basal insulin ). Steroid use- None.    Renal function- Abnormal - Creatinine 4.1   Vasopressors-  None       Endocrine will continue to follow and manage insulin orders inpatient.         Diet NPO     Eating:   NPO  Nausea: No  Hypoglycemia and intervention: No  Fever: No  TPN and/or TF: No    PMH, PSH, FH, SH updated and reviewed     ROS:  Review of Systems   Constitutional:  Negative for unexpected weight change.   Eyes:  Negative for visual disturbance.   Respiratory:  Positive for cough.    Cardiovascular:  Positive for chest pain.   Gastrointestinal:  Negative for nausea and vomiting.   Endocrine: Negative for polydipsia and polyuria.   Musculoskeletal:  Negative for back pain.   Skin:  Negative for rash.   Neurological:  Negative for syncope.   Psychiatric/Behavioral:  Negative for agitation and dysphoric mood.        Current Medications and/or Treatments Impacting Glycemic Control  Immunotherapy:    Immunosuppressants       None          Steroids:   Hormones (From admission, onward)      Start     Stop Route Frequency Ordered    12/29/23 0020  melatonin tablet 3 mg         -- Oral Nightly PRN 12/28/23 2331          Pressors:    Autonomic Drugs (From admission, onward)      None          Hyperglycemia/Diabetes Medications:   Antihyperglycemics (From admission, onward)      Start     Stop Route Frequency Ordered    12/29/23 1145  insulin aspart U-100 pen 7 Units         -- SubQ 3 times daily with meals 12/29/23 1133    12/29/23 0907  insulin aspart U-100 pen 0-10 Units         -- SubQ Before meals & nightly PRN 12/29/23 0807    12/29/23 0900  insulin detemir U-100 (Levemir) pen 20 Units         -- SubQ Daily 12/28/23 2331             PHYSICAL EXAMINATION:  Vitals:    12/29/23 1130   BP:    Pulse: 85   Resp:    Temp:      Body mass index is  27.1 kg/m².     Physical Exam  Constitutional:       General: She is not in acute distress.     Appearance: Normal appearance. She is not ill-appearing.   HENT:      Head: Normocephalic and atraumatic.      Right Ear: External ear normal.      Left Ear: External ear normal.      Nose: Nose normal.   Cardiovascular:      Rate and Rhythm: Normal rate and regular rhythm.      Heart sounds: No murmur heard.  Pulmonary:      Effort: Pulmonary effort is normal. No respiratory distress.   Abdominal:      General: Bowel sounds are normal. There is no distension.      Tenderness: There is no abdominal tenderness.   Musculoskeletal:         General: No swelling.      Right lower leg: No edema.      Left lower leg: No edema.   Skin:     Findings: No erythema.   Neurological:      General: No focal deficit present.      Mental Status: She is alert and oriented to person, place, and time.   Psychiatric:         Mood and Affect: Mood normal.         Behavior: Behavior normal.

## 2023-12-29 NOTE — H&P
Wilder [facility] - Cardiology [facility] Medicine  History & Physical    Patient Name: Ellen Roman  MRN: [id number]  Patient Class: IP- Inpatient  Admission Date: 12/28/2023  Attending Physician: Yamilet Boyce MD   Primary Care Provider: Paco Amanda MD    Patient information was obtained from patient, past medical records, and ER records.     Subjective:     Principal Problem:Bacteremia    HPI: Ellen Roman is 58 year old female with PMHx significant for T1DM c/b gastroparesis, retinopathy and nephropathy - ESRD on HD, HTN, HLD, CAD s/p prior PCI with ischemic cardiomyopathy s/p AICD placement, chronic thrombocytopenia, severe mitral regurgitation status post MitraClip, history of fungemia/bacteremia initially presenting to [facility] ER on 12/18 with a complaints of right-sided chest pain associated with nausea and vomiting multiple episodes for the prior 2 days.     She also reported low-grade temperature. Patient reports generalized weakness. Cardiology performed LHC on 12/20/2023 for evaluation of this chest pain with recommendation for medical management for coronary artery disease. Course is then complicated by refractory MRSA bacteremia. HM team initially suspected source of bacteremia was a tunneled dialysis catheter. This was removed upon admission and patient had placement of left IJ. The placement of left IJ trialysis catheter was c/b IJ thrombosis for which she is started on provoked DVT management - currently with lovenox though with ESRD would consider transition to heparin. IR had difficulty placing the right IJ due to thrombosis as well and access subsequently was placed on the right femoral. Due to persistent bacteremia despite vancomycin, patient subsequently had JAVY 12/27/23 with findings reported as positive for vegetations present on the ICD lead wire. As per OS cardiologist, requested transfer from [facility] to [facility]. 2018 CRT-D [employer].     Current medications:  aspirin 81 mg  Oral Daily   atorvastatin 40 mg Oral Nightly   cefepime 1 g Intravenous Q24H   DAPTOmycin 6 mg/kg Intravenous Q48H since 12/26  enoxaparin 1 mg/kg (Ideal) Subcutaneous Nightly   epoetin john 100 Units/kg Subcutaneous Once per day on Tuesday Thursday Saturday   hydrALAZINE 25 mg Oral TID   insulin glargine 10 Units Subcutaneous Nightly   insulin lispro 0-10 Units Subcutaneous AC & HS   ipratropium-albuteroL 3 mL Nebulization Q6H   metoprolol succinate XL 50 mg Oral Daily   pantoprazole 40 mg Oral QAM   pregabalin 50 mg Oral Daily since 12/27  rifAMPin 300 mg Oral Daily   sacubitriL-valsartan 1 tablet Oral BID   vancomycin 15 mg/kg Intravenous since 12/18    Past Medical History:   Diagnosis Date    Acute hepatitis A     Acute kidney injury 03/07/2018    Acute respiratory failure with hypoxemia 03/13/2020    Arthritis     Cataract     No date or laterality given    Chronic systolic CHF (congestive heart failure), NYHA class 3     Coronary artery disease     Diabetes mellitus     Dialysis patient     Dilated cardiomyopathy     Diverticulosis of intestine 10/02/2013    Gastroparesis 02/24/2014    Hypertension     ICD (implantable cardioverter-defibrillator), biventricular, in situ     Ischemic cardiomyopathy 04/29/2018    Left bundle branch block 04/29/2018    Microcytic hypochromic anemia 03/07/2018    Migraine 01/24/2016    Presence of drug coated stent in LAD coronary artery     A SHU stent was placed to the mid D#2 in 7-2016, and then into the ostium of the D#2 in 1-2018, and then to the prox-mid D#2 in 3-2017    Presence of drug coated stent in left circumflex coronary artery 01/20/2017    A SHU stent was placed to the proximal OM 1 branch in January of 2017    Severe mitral regurgitation 04/12/2018     Past Surgical History:   Procedure Laterality Date    ABLATION OF ARRHYTHMOGENIC FOCUS FOR SUPRAVENTRICULAR TACHYCARDIA WITH ELECTROPHYSIOLOGY STUDY N/A 10/06/2021    Procedure: Ablation SVT;  Surgeon: Joby Gutiérrez  MD;  Location: [facility] EP LAB;  Service: Cardiology;  Laterality: N/A;    ANGIOGRAM, CORONARY, WITH LEFT HEART CATHETERIZATION  04/05/2022    Procedure: Angiogram, Coronary, with Left Heart Cath;  Surgeon: Manav Schneider MD;  Location: [facility] CATH LAB;  Service: Cardiology;;    BREAST BIOPSY Left     BREAST SURGERY Left     left biopsy    CARDIAC CATHETERIZATION      CARDIAC DEFIBRILLATOR PLACEMENT      CARDIAC ELECTROPHYSIOLOGY STUDY WITH DRUG CHALLENGE  10/06/2021    Procedure: EP Drug challenge;  Surgeon: Joby Gutiérrez MD;  Location: [facility] EP LAB;  Service: Cardiology;;    CATHETERIZATION OF BOTH LEFT AND RIGHT HEART N/A 12/10/2019    Procedure: CATHETERIZATION, HEART, BOTH LEFT AND RIGHT;  Surgeon: LUIS ALFREDO Ernandez MD;  Location: [facility] CATH LAB;  Service: Cardiology;  Laterality: N/A;    TONSILLECTOMY      TRANSESOPHAGEAL ECHOCARDIOGRAPHY  07/11/2016    TRANSESOPHAGEAL ECHOCARDIOGRAPHY N/A 01/10/2022    Procedure: ECHOCARDIOGRAM, TRANSESOPHAGEAL;  Surgeon: Leslie Fishman MD;  Location: [facility] CATH LAB;  Service: Cardiology;  Laterality: N/A;    TRANSESOPHAGEAL ECHOCARDIOGRAPHY N/A 8/17/2022    Procedure: ECHOCARDIOGRAM, TRANSESOPHAGEAL;  Surgeon: Manav Schneider MD;  Location: [facility] CATH LAB;  Service: Cardiology;  Laterality: N/A;    TUBAL LIGATION      VASCULAR SURGERY       Review of patient's allergies indicates:  No Known Allergies    Current Facility-Administered Medications on File Prior to Encounter   Medication    0.9%  NaCl infusion (for blood administration)    0.9%  NaCl infusion (for blood administration)    benzocaine 20 % oral spray    sodium chloride 0.9% flush 10 mL     Current Outpatient Medications on File Prior to Encounter   Medication Sig    alirocumab (PRALUENT PEN) 150 mg/mL PnIj Inject 1 mL (150 mg total) into the skin every 14 (fourteen) days.    atorvastatin (LIPITOR) 40 MG tablet Take 1 tablet by mouth nightly.    calcitRIOL (ROCALTROL) 0.25 MCG Cap Take 1 capsule by mouth once daily.     cloNIDine (CATAPRES) 0.3 MG tablet Take 1 tablet by mouth 2 (two) times daily.    clopidogreL (PLAVIX) 75 mg tablet Take 1 tablet by mouth once daily.    diphenhydrAMINE (BENADRYL) 25 mg capsule Take 1 capsule (25 mg total) by mouth nightly as needed for Itching.    doxazosin (CARDURA) 8 MG Tab Take 1 tablet by mouth nightly.    ENTRESTO  mg per tablet TAKE 1 TABLET(S) BY MOUTH TWO TIMES A DAY    famotidine (PEPCID) 20 MG tablet Take 1 tablet by mouth once daily.    furosemide (LASIX) 20 MG tablet Take 1 tablet by mouth once daily.    hydrALAZINE (APRESOLINE) 50 MG tablet Take 1 tablet by mouth 2 (two) times a day (morning and before bed).    metoprolol succinate (TOPROL-XL) 100 MG 24 hr tablet Take 1 tablet by mouth once daily.    metroNIDAZOLE (FLAGYL) 500 MG tablet Take 1 tablet in the morning and 1 tablet before bedtime for 7 days.    NIFEdipine (PROCARDIA-XL) 60 MG (OSM) 24 hr tablet Take 1 tablet by mouth once daily.    ondansetron (ZOFRAN-ODT) 4 MG TbDL Take 1 tablet by mouth.    ondansetron (ZOFRAN-ODT) 4 MG TbDL Take 1 tablet by mouth every 8 (eight) hours as needed for up to 7 days.    pantoprazole (PROTONIX) 40 MG tablet Take 1 tablet by mouth once daily.    prochlorperazine (COMPAZINE) 10 MG tablet Take 1 tablet by mouth every 6 (six) hours as needed for up to 7 days.    promethazine (PHENERGAN) 25 MG tablet Take 1 tablet by mouth 4 (four) times daily as needed for nausea for up to 7 days    thiamine (VITAMIN B-1) 100 MG tablet Take 1 tablet (100 mg total) by mouth once daily.    vancomycin (VANCOCIN) 125 MG capsule Take 1 capsule by mouth in the morning and 1capsule by mouth at noon and 1 capsule by mouth in the evening and 1 capsule by mouth before bedtime for 10 days.    amoxicillin-clavulanate 500-125mg (AUGMENTIN) 500-125 mg Tab Take 1 tablet by mouth every morning for 2 days    blood sugar diagnostic (TRUE METRIX GLUCOSE TEST STRIP MISC) True Metrix Glucose Test Strip   USE TO TEST BLOOD  SUGAR 3 TIMES DAILY    blood sugar diagnostic Strp Use to test blood sugar 3 (three) times daily before meals.    blood-glucose meter Misc Use as directed    blood-glucose meter Misc Use to test blood sugar as directed    blood-glucose sensor (DEXCOM G7 SENSOR) Gini Apply one to skin as directed and change q 10 days.    bumetanide (BUMEX) 2 MG tablet Take 1 tablet by mouth once daily.    calcitRIOL (ROCALTROL) 0.25 MCG Cap Take 1 capsule by mouth once daily.    calcium acetate,phosphat bind, (PHOSLO) 667 mg tablet Take 1 tablet by mouth in the morning and 1 tablet at noon and 1 tablet in the evening with meals.    epoetin john (PROCRIT) 20,000 unit/mL injection Inject 1 ml under the skin every two weeks. Administer only if hemoglobin is <10.5.    insulin glargine 100 units/mL SubQ pen Inject 30 Units into the skin nightly.    insulin lispro 100 unit/mL injection Inject 5 Units into the skin 3 (three) times daily with meals. Discard vial 28 days after opening.    insulin lispro 100 unit/mL injection Inject 0-20 Units into the skin 4 (four) times daily before meals and nightly. If BS : 0 units, -200: 4 units, -250: 8 units; -300: 12 units, -350: 16 units, -400: 20 units, BS > 400: 20 units and call your doctor    insulin lispro protamin-lispro (HUMALOG MIX 50-50 KWIKPEN) 100 unit/mL (50-50) InPn Inject 10 Units into the skin 3 (three) times daily before meals.    metoclopramide HCl (REGLAN) 10 MG tablet Take 1 tablet by mouth 4 (four) times daily before meals and nightly.    nitroGLYCERIN (NITROSTAT) 0.4 MG SL tablet Place 1 tablet under the tongue every 5 (five) minutes as needed for chest pain.  Max of 3 tablets in a 15 minute period.    venlafaxine (EFFEXOR-XR) 37.5 MG 24 hr capsule Take 1 capsule by mouth daily    [DISCONTINUED] aspirin 81 MG Chew Take 81 mg by mouth.    [DISCONTINUED] folic acid (FOLVITE) 1 MG tablet Take 5 tablets by mouth daily for 10 days, THEN take 1 tablet  "daily for 80 days    [DISCONTINUED] insulin syringe-needle U-100 1 mL 31 gauge x 15/64" Syrg Inject 1 Syringe into the skin once daily.    [DISCONTINUED] lancets (ONETOUCH ULTRASOFT LANCETS) Misc Use 1 lancet to check blood sugar 4 (four) times daily.    [DISCONTINUED] losartan (COZAAR) 100 MG tablet Take 1 tablet  by mouth nightly for blood pressure.     Family History       Problem Relation (Age of Onset)    Colon cancer Father    Heart disease Mother    Hypertension Mother, Brother, Brother    Kidney cancer Sister    Leukemia Sister          Tobacco Use    Smoking status: Never    Smokeless tobacco: Never   Substance and Sexual Activity    Alcohol use: Not Currently    Drug use: Never    Sexual activity: Yes     Partners: Male     Review of Systems   Constitutional:  Negative for chills, diaphoresis, fatigue and fever.   HENT:  Negative for congestion, hearing loss, rhinorrhea, sinus pain and sore throat.    Eyes:  Negative for pain, redness and visual disturbance.   Respiratory:  Negative for cough, chest tightness, shortness of breath and wheezing.    Cardiovascular:  Negative for chest pain, palpitations and leg swelling.   Gastrointestinal:  Negative for abdominal distention, abdominal pain, constipation, diarrhea, nausea and vomiting.   Endocrine: Negative for cold intolerance, heat intolerance and polyuria.   Genitourinary:  Negative for difficulty urinating, dysuria, flank pain, frequency and hematuria.   Musculoskeletal:  Negative for arthralgias, back pain, joint swelling and myalgias.   Skin:  Negative for pallor, rash and wound.   Neurological:  Negative for dizziness, syncope, weakness, light-headedness, numbness and headaches.     Objective:     Vital Signs (Most Recent):  Temp: 98.2 °F (36.8 °C) (12/28/23 2232)  Pulse: 84 (12/28/23 2335)  Resp: 20 (12/28/23 2232)  BP: (!) 162/77 (12/28/23 2232)  SpO2: 99 % (12/28/23 2232) Vital Signs (24h Range):  Temp:  [98.2 °F (36.8 °C)] 98.2 °F (36.8 °C)  Pulse: "  [84] 84  Resp:  [20] 20  SpO2:  [99 %] 99 %  BP: (162)/(77) 162/77     Weight: 78.5 kg (173 lb 1 oz)  Body mass index is 27.11 kg/m².     Physical Exam  Constitutional:       General: She is not in acute distress.     Appearance: She is not diaphoretic.   HENT:      Head: Normocephalic and atraumatic.      Right Ear: External ear normal.      Left Ear: External ear normal.      Nose: Nose normal.      Mouth/Throat:      Pharynx: No oropharyngeal exudate.   Eyes:      General: No scleral icterus.     Extraocular Movements: Extraocular movements intact.      Conjunctiva/sclera: Conjunctivae normal.      Pupils: Pupils are equal, round, and reactive to light.   Neck:      Thyroid: No thyromegaly.      Vascular: No JVD.      Trachea: No tracheal deviation.   Cardiovascular:      Rate and Rhythm: Normal rate and regular rhythm.      Heart sounds: Normal heart sounds. No murmur heard.  Pulmonary:      Effort: No respiratory distress.      Breath sounds: Normal breath sounds. No wheezing or rales.   Chest:      Chest wall: No tenderness.   Abdominal:      General: Bowel sounds are normal. There is no distension.      Palpations: Abdomen is soft.      Tenderness: There is no abdominal tenderness.   Musculoskeletal:         General: No swelling or tenderness. Normal range of motion.      Cervical back: Normal range of motion and neck supple.      Right lower leg: Edema present.      Left lower leg: Edema present.   Skin:     General: Skin is warm and dry.      Coloration: Skin is not pale.      Findings: No erythema or rash.   Neurological:      Mental Status: She is alert and oriented to person, place, and time.      Cranial Nerves: No cranial nerve deficit.      Sensory: No sensory deficit.      Coordination: Coordination normal.   Psychiatric:         Mood and Affect: Mood normal.         Behavior: Behavior normal. Behavior is cooperative.         Thought Content: Thought content normal.         Judgment: Judgment  normal.       Significant Labs: All pertinent labs within the past 24 hours have been reviewed.    Significant Imaging: I have reviewed all pertinent imaging results/findings within the past 24 hours.  Assessment/Plan:     * Bacteremia  MRSA on OSH Bcx since 12/18. On daptomycin 6mg/kg Q48hr and cefepime at OSH  Trialysis line removed due to concern for source  12/27 JAVY concern for vegetation reported  Consult ID  Consult EP      ESRD (end stage renal disease)  ESRD with trialysis line removed.   Consult nephrology, will discuss access in AM as IJ trialysis line removed at OSH    Type 2 diabetes mellitus with circulatory disorder, with long-term current use of insulin  Type 1 diabetic on 20units lantus in AM, 5 units TIDWM, and sliding scale TID as needed.  Consult endocrine in AM      Cardiac resynchronization therapy defibrillator (CRT-D) in place  [employer] CRT-D implanted 2018      Nonrheumatic mitral valve regurgitation  S/p mitraclip       Coronary artery disease involving native coronary artery of native heart without angina pectoris  - S/P PCI D2 with SHU x 1 (07/13/2016)  - S/P PCI OM1 with SHU x 1 (01/04/2017)  - S/P PCI prox D2 with SHU x 1 (03/07/2017)  - Patent stents with NCCAD by [facility] 12/10/2019  - S/P [facility] with RCA 90 % stenosis, SHU x 3 (4/5/2022)    Most recent C during admission. Continue aspirin, heparin drip.      VTE Risk Mitigation (From admission, onward)           Ordered     heparin 25,000 units in dextrose 5% 250 mL (100 units/mL) infusion LOW INTENSITY nomogram - [facility]  Continuous        Question:  Begin at (units/kg/hr)  Answer:  12    12/28/23 2331     heparin (porcine) injection 3,000 Units  Daily PRN         12/28/23 2331                     Bhupendra Kumar MD  Department of Hospital Medicine  [facility] - Cardiology Stepdown

## 2023-12-29 NOTE — ASSESSMENT & PLAN NOTE
Type 1 diabetic on 20units lantus in AM, 5 units TIDWM, and sliding scale TID as needed.  Consult endocrine in AM

## 2023-12-29 NOTE — CONSULTS
[facility] - Cardiology Stepdown  Nephrology  Consult Note    Patient Name: Ellen Roman  MRN: [id number]  Admission Date: 12/28/2023  Hospital Length of Stay: 1 days  Attending Provider: Yamilet Boyce MD   Primary Care Physician: Paco Amanda MD  Principal Problem:Bacteremia    Inpatient consult to Nephrology  Consult performed by: Jono Lamas NP  Consult ordered by: Bhupendra Kumar MD  Reason for consult: ESRD        Subjective:     HPI: Ellen Roman is a 59 yo female with hx of DM1, HTN, HLD, CAD s/p PCI with AICD placement, Mitral Regurgitation s/p clip, and ESRD on iHD MWF who presents as a transfer from OSH for higher level of care.  According to records, patient initially presented to OSH on 12/18 with chief complaint of R sided chest pain associated with N/V x 2 days with low grade temp.  Cardiology evaluated there and performed a LCH on 12/20 but no PCI was performed and they recommended medical management for her CAD.  Her BC grew MRSA bacteremia that remained positive during the admission with source suspected to be from her RIJ Tunneled dialysis catheter. The TDC was removed and IR was consulted for tempoary line placement and she was found to have a thrombosed LIJ from her previous catheter and a temporary catheter had to be placed to the R femoral vein.  Her BC continued to remain positive despite treatment with Vancomycin and a JAVY was ordered with findings of vegetations to the AICD lead wire and she was then transferred to [facility] for higher level of care.      Past Medical History:   Diagnosis Date    Acute hepatitis A     Acute kidney injury 03/07/2018    Acute respiratory failure with hypoxemia 03/13/2020    Arthritis     Cataract     No date or laterality given    Chronic systolic CHF (congestive heart failure), NYHA class 3     Coronary artery disease     Diabetes mellitus     Dialysis patient     Dilated cardiomyopathy     Diverticulosis of intestine 10/02/2013    Gastroparesis  02/24/2014    Hypertension     ICD (implantable cardioverter-defibrillator), biventricular, in situ     Ischemic cardiomyopathy 04/29/2018    Left bundle branch block 04/29/2018    Microcytic hypochromic anemia 03/07/2018    Migraine 01/24/2016    Presence of drug coated stent in LAD coronary artery     A SHU stent was placed to the mid D#2 in 7-2016, and then into the ostium of the D#2 in 1-2018, and then to the prox-mid D#2 in 3-2017    Presence of drug coated stent in left circumflex coronary artery 01/20/2017    A SHU stent was placed to the proximal OM 1 branch in January of 2017    Severe mitral regurgitation 04/12/2018       Past Surgical History:   Procedure Laterality Date    ABLATION OF ARRHYTHMOGENIC FOCUS FOR SUPRAVENTRICULAR TACHYCARDIA WITH ELECTROPHYSIOLOGY STUDY N/A 10/06/2021    Procedure: Ablation SVT;  Surgeon: Joby uGtiérrez MD;  Location: [facility] EP LAB;  Service: Cardiology;  Laterality: N/A;    ANGIOGRAM, CORONARY, WITH LEFT HEART CATHETERIZATION  04/05/2022    Procedure: Angiogram, Coronary, with Left Heart Cath;  Surgeon: Manav Schneider MD;  Location: [facility] CATH LAB;  Service: Cardiology;;    BREAST BIOPSY Left     BREAST SURGERY Left     left biopsy    CARDIAC CATHETERIZATION      CARDIAC DEFIBRILLATOR PLACEMENT      CARDIAC ELECTROPHYSIOLOGY STUDY WITH DRUG CHALLENGE  10/06/2021    Procedure: EP Drug challenge;  Surgeon: Joby Gutiérrez MD;  Location: [facility] EP LAB;  Service: Cardiology;;    CATHETERIZATION OF BOTH LEFT AND RIGHT HEART N/A 12/10/2019    Procedure: CATHETERIZATION, HEART, BOTH LEFT AND RIGHT;  Surgeon: LUIS ALFREDO Ernandez MD;  Location: [facility] CATH LAB;  Service: Cardiology;  Laterality: N/A;    TONSILLECTOMY      TRANSESOPHAGEAL ECHOCARDIOGRAPHY  07/11/2016    TRANSESOPHAGEAL ECHOCARDIOGRAPHY N/A 01/10/2022    Procedure: ECHOCARDIOGRAM, TRANSESOPHAGEAL;  Surgeon: Leslie Fishman MD;  Location: [facility] CATH LAB;  Service: Cardiology;  Laterality: N/A;    TRANSESOPHAGEAL  ECHOCARDIOGRAPHY N/A 8/17/2022    Procedure: ECHOCARDIOGRAM, TRANSESOPHAGEAL;  Surgeon: Manav Schneider MD;  Location: [facility] CATH LAB;  Service: Cardiology;  Laterality: N/A;    TUBAL LIGATION      VASCULAR SURGERY         Review of patient's allergies indicates:  No Known Allergies  Current Facility-Administered Medications   Medication Frequency    albuterol sulfate nebulizer solution 2.5 mg Q4H    albuterol-ipratropium 2.5 mg-0.5 mg/3 mL nebulizer solution 3 mL Q6H PRN    aspirin chewable tablet 81 mg Daily    atorvastatin tablet 40 mg Daily    benzonatate capsule 200 mg Q8H PRN    calcium acetate(phosphat bind) capsule 667 mg TID WM    ceFEPIme (MAXIPIME) 1 g in dextrose 5 % in water (D5W) 100 mL IVPB (MB+) Q24H    [START ON 12/31/2023] DAPTOmycin (CUBICIN) 785 mg in sodium chloride 0.9% SolP 50 mL IVPB Q48H    dextrose 50 % in water (D50W) injection 50 mL PRN    [START ON 12/30/2023] epoetin john injection 7,800 Units Every Tues, Thurs, Sat    furosemide tablet 20 mg Daily    glucagon (human recombinant) injection 1 mg PRN    glucose chewable tablet 16 g PRN    glucose chewable tablet 24 g PRN    heparin (porcine) injection 3,000 Units Daily PRN    heparin 25,000 units in dextrose 5% (100 units/ml) IV bolus from bag - ADDITIONAL PRN BOLUS - 30 units/kg PRN    heparin 25,000 units in dextrose 5% (100 units/ml) IV bolus from bag - ADDITIONAL PRN BOLUS - 60 units/kg PRN    heparin 25,000 units in dextrose 5% 250 mL (100 units/mL) infusion HIGH INTENSITY nomogram - [facility] Continuous    hydrALAZINE injection 10 mg Q8H PRN    hydrALAZINE tablet 25 mg Q8H    insulin aspart U-100 pen 0-10 Units QID (AC + HS) PRN    insulin aspart U-100 pen 5 Units TIDWM    insulin detemir U-100 (Levemir) pen 20 Units Daily    melatonin tablet 3 mg Nightly PRN    metoprolol succinate (TOPROL-XL) 24 hr tablet 50 mg Daily    pantoprazole EC tablet 40 mg Daily    pregabalin capsule 50 mg Daily    promethazine tablet 25 mg Q6H PRN     sacubitriL-valsartan 24-26 mg per tablet 1 tablet BID     Facility-Administered Medications Ordered in Other Encounters   Medication Frequency    0.9%  NaCl infusion (for blood administration) Q24H PRN    0.9%  NaCl infusion (for blood administration) Q24H PRN    benzocaine 20 % oral spray QID PRN    sodium chloride 0.9% flush 10 mL PRN     Family History       Problem Relation (Age of Onset)    Colon cancer Father    Heart disease Mother    Hypertension Mother, Brother, Brother    Kidney cancer Sister    Leukemia Sister          Tobacco Use    Smoking status: Never    Smokeless tobacco: Never   Substance and Sexual Activity    Alcohol use: Not Currently    Drug use: Never    Sexual activity: Yes     Partners: Male     Review of Systems   Constitutional:  Negative for activity change, appetite change, fatigue and fever.   HENT:  Negative for congestion, facial swelling, postnasal drip, rhinorrhea, sinus pressure and sinus pain.    Respiratory:  Negative for cough, chest tightness and shortness of breath.    Cardiovascular:  Negative for chest pain, palpitations and leg swelling.   Gastrointestinal:  Negative for abdominal distention, constipation, diarrhea, nausea and vomiting.   Musculoskeletal:  Negative for arthralgias, gait problem and myalgias.   Skin:  Negative for color change, rash and wound.   Neurological:  Negative for dizziness, light-headedness and headaches.   Psychiatric/Behavioral:  Negative for agitation, behavioral problems and confusion.      Objective:     Vital Signs (Most Recent):  Temp: 98.2 °F (36.8 °C) (12/29/23 0753)  Pulse: 72 (12/29/23 0820)  Resp: 20 (12/29/23 0820)  BP: (!) 149/69 (12/29/23 0753)  SpO2: 95 % (12/29/23 0820) Vital Signs (24h Range):  Temp:  [98.1 °F (36.7 °C)-98.2 °F (36.8 °C)] 98.2 °F (36.8 °C)  Pulse:  [72-93] 72  Resp:  [16-20] 20  SpO2:  [91 %-99 %] 95 %  BP: (149-184)/(69-83) 149/69     Weight: 78.5 kg (173 lb 1 oz) (12/28/23 2232)  Body mass index is 27.11  kg/m².  Body surface area is 1.93 meters squared.    No intake/output data recorded.     Physical Exam  Vitals and nursing note reviewed.   Constitutional:       General: She is not in acute distress.     Appearance: Normal appearance. She is not ill-appearing or toxic-appearing.   HENT:      Head: Normocephalic and atraumatic.      Nose: No congestion or rhinorrhea.      Mouth/Throat:      Mouth: Mucous membranes are moist.      Pharynx: Oropharynx is clear. No oropharyngeal exudate or posterior oropharyngeal erythema.   Eyes:      General: No scleral icterus.        Right eye: No discharge.         Left eye: No discharge.      Extraocular Movements: Extraocular movements intact.      Conjunctiva/sclera: Conjunctivae normal.   Cardiovascular:      Rate and Rhythm: Normal rate and regular rhythm.      Heart sounds: No murmur heard.     No friction rub. No gallop.      Comments: R fem temporary dialysis catheter  Pulmonary:      Effort: Pulmonary effort is normal. No respiratory distress.      Breath sounds: No wheezing or rales.   Abdominal:      General: Bowel sounds are normal. There is no distension.      Palpations: Abdomen is soft.      Tenderness: There is no abdominal tenderness.   Musculoskeletal:         General: No swelling.      Cervical back: Normal range of motion and neck supple.      Right lower leg: No edema.      Left lower leg: No edema.   Skin:     General: Skin is warm and dry.   Neurological:      General: No focal deficit present.      Mental Status: She is alert and oriented to person, place, and time.   Psychiatric:         Mood and Affect: Mood normal.         Behavior: Behavior normal.          Significant Labs:  CBC:   Recent Labs   Lab 12/29/23  0917   WBC 5.13   RBC 2.69*   HGB 7.9*   HCT 25.1*   *   MCV 93   MCH 29.4   MCHC 31.5*     CMP:   Recent Labs   Lab 12/29/23  0004   *  309*   CALCIUM 7.6*  7.6*   ALBUMIN 2.2*  2.2*   PROT 5.9*   *  135*   K 4.1  4.1    CO2 26  26   CL 97  97   BUN 21*  21*   CREATININE 4.1*  4.1*   ALKPHOS 60   ALT 8*   AST 8*   BILITOT 0.4       Assessment/Plan:     Renal/  Chronic kidney disease-mineral and bone disorder  -low phos diet  -continue calcium acetate    ESRD (end stage renal disease)  ESRD on iHD MWF  [facility]  4 hour treatments  EDW of 73 kg    Temporary line to R femoral, reported that patient has thrombosed RIJ where TDC was previously.  ICD to the L.    Plan/Recommendations:  -Had HD prior to transfer with 2L removed per patient report.  Labs non-emergent.  -Will plan for HD tomorrow, no emergent need for further treatments today  -Defer tunneled dialysis catheter placement at this time, will need prior to discharge home.  Likely will need to be placed to the femoral area.  -discussed with patient, will need to work on long term dialysis access (AVF vs AVG) as OP, reports that she was trying to get arranged for peritoneal dialysis.    -renal diet when advanced, 1.5L fluid restrictions  -will continue following for HD needs while IP.    ID  * Bacteremia  -MRSA Bacteremia  -RIJ TDC removed @ OSH, BC remained positive  -JAVY performed, vegetations to ICD lead.      Oncology  Anemia due to stage 5 chronic kidney disease, not on chronic dialysis  Epo with HD  -defer iron therapy with bacteremia        Jono Esparza NP  Nephrology  [facility] - Cardiology Stepdown

## 2023-12-29 NOTE — PROGRESS NOTES
Cardiac device interrogation and/or reprogramming completed by industry representative, Ryan with [employer]; please refer to report located in the Media tab.

## 2023-12-29 NOTE — ANESTHESIA POSTPROCEDURE EVALUATION
Anesthesia Post Evaluation    Patient: Ellen Roman    Procedure(s) Performed: Procedure(s) (LRB):  Transesophageal echo (JAVY) intra-procedure log documentation (N/A)    Final Anesthesia Type: general (Natural airway)      Patient location during evaluation: PACU  Patient participation: Yes- Able to Participate  Level of consciousness: awake and alert  Post-procedure vital signs: reviewed and stable  Pain management: adequate  Airway patency: patent    PONV status at discharge: No PONV  Anesthetic complications: no      Cardiovascular status: hemodynamically stable  Respiratory status: unassisted  Hydration status: euvolemic  Follow-up not needed.              Vitals Value Taken Time   /68 12/29/23 1548   Temp 36.6 °C (97.8 °F) 12/29/23 1431   Pulse 88 12/29/23 1605   Resp 20 12/29/23 1605   SpO2 98 % 12/29/23 1605         Event Time   Out of Recovery 12/29/2023 15:30:00         Pain/Alan Score: Alan Score: 9 (12/29/2023  3:15 PM)

## 2023-12-29 NOTE — PLAN OF CARE
Problem: Adult Inpatient Plan of Care  Goal: Plan of Care Review  Outcome: Ongoing, Progressing  Goal: Patient-Specific Goal (Individualized)  Outcome: Ongoing, Progressing  Goal: Absence of Hospital-Acquired Illness or Injury  Outcome: Ongoing, Progressing  Goal: Optimal Comfort and Wellbeing  Outcome: Ongoing, Progressing  Goal: Readiness for Transition of Care  Outcome: Ongoing, Progressing     Problem: Diabetes Comorbidity  Goal: Blood Glucose Level Within Targeted Range  Outcome: Ongoing, Progressing     Problem: Fluid and Electrolyte Imbalance (Acute Kidney Injury/Impairment)  Goal: Fluid and Electrolyte Balance  Outcome: Ongoing, Progressing     Problem: Oral Intake Inadequate (Acute Kidney Injury/Impairment)  Goal: Optimal Nutrition Intake  Outcome: Ongoing, Progressing     Problem: Renal Function Impairment (Acute Kidney Injury/Impairment)  Goal: Effective Renal Function  Outcome: Ongoing, Progressing     Problem: Infection  Goal: Absence of Infection Signs and Symptoms  Outcome: Ongoing, Progressing     Problem: Device-Related Complication Risk (Hemodialysis)  Goal: Safe, Effective Therapy Delivery  Outcome: Ongoing, Progressing     Problem: Hemodynamic Instability (Hemodialysis)  Goal: Effective Tissue Perfusion  Outcome: Ongoing, Progressing     Problem: Infection (Hemodialysis)  Goal: Absence of Infection Signs and Symptoms  Outcome: Ongoing, Progressing   Plan of care discussed with patient. Patient is free of fall/trauma/injury. Denies CP, SOB, or pain/discomfort. All questions addressed. Will continue to monitor. AAO X4 and VSS.   Nurses Note -- 4 Eyes      12/29/2023   4:31 PM      Skin assessed during: Daily Assessment      [x] No Altered Skin Integrity Present    []Prevention Measures Documented      [] Yes- Altered Skin Integrity Present or Discovered   [] LDA Added if Not in Epic (Describe Wound)   [] New Altered Skin Integrity was Present on Admit and Documented in LDA   [] Wound Image  Taken    Wound Care Consulted? No    Attending Nurse:  Sunday Dudley LPN     Second RN/Staff Member:  JANICE Eldridge

## 2023-12-29 NOTE — HPI
Reason for Consult: Management of VANESSA (managed as T1DM), Hyperglycemia     Diabetes diagnosis year: 2008 (likely had issues with decreasing pancreatic function prior)    Home Diabetes Medications:  Lantus 30 units nightly; Humalog 5 units with meals + SSI (ISF 1:10)    How often checking glucose at home? >4 x day (Dexcom G6)  BG readings on regimen: 100-150's  Hypoglycemia on the regimen?  No  Missed doses on regimen?  No    Diabetes Complications include:     Hyperglycemia, Hypoglycemia , Hypoglycemia unawareness, Diabetic chronic kidney disease     , Diabetic retinopathy , Diabetic cataract , and Diabetic peripheral neuropathy     Complicating diabetes co morbidities:   HTN; HLD; CAD; ESRD on HD      HPI: Ellen Roman is a 57 yo female with hx of DM1, HTN, HLD, CAD s/p PCI with AICD placement, Mitral Regurgitation s/p clip, and ESRD on iHD MWF who presents as a transfer from OSH for higher level of care.  According to records, patient initially presented to OSH on 12/18 with chief complaint of R sided chest pain associated with N/V x 2 days with low grade temp.  Cardiology evaluated there and performed a LCH on 12/20 but no PCI was performed and they recommended medical management for her CAD.  Her BC grew MRSA bacteremia that remained positive during the admission with source suspected to be from her RIJ Tunneled dialysis catheter. The TDC was removed and IR was consulted for tempoary line placement and she was found to have a thrombosed LIJ from her previous catheter and a temporary catheter had to be placed to the R femoral vein.  Her BC continued to remain positive despite treatment with Vancomycin and a JAVY was ordered with findings of vegetations to the AICD lead wire and she was then transferred to [facility] for higher level of care. Endocrine consulted to manage type 1 diabetes. Of note, chart has mixed information regarding T1DM vs. T2DM. Suppressed C-peptide noted at [facility], but  cannot find antibody labs to confirm VANESSA vs. Pancreatic DM.      Lab Results   Component Value Date    HGBA1C 8.0 (H) 12/19/2023

## 2023-12-29 NOTE — SUBJECTIVE & OBJECTIVE
Past Medical History:   Diagnosis Date    Acute hepatitis A     Acute kidney injury 03/07/2018    Acute respiratory failure with hypoxemia 03/13/2020    Arthritis     Cataract     No date or laterality given    Chronic systolic CHF (congestive heart failure), NYHA class 3     Coronary artery disease     Diabetes mellitus     Dialysis patient     Dilated cardiomyopathy     Diverticulosis of intestine 10/02/2013    Gastroparesis 02/24/2014    Hypertension     ICD (implantable cardioverter-defibrillator), biventricular, in situ     Ischemic cardiomyopathy 04/29/2018    Left bundle branch block 04/29/2018    Microcytic hypochromic anemia 03/07/2018    Migraine 01/24/2016    Presence of drug coated stent in LAD coronary artery     A SHU stent was placed to the mid D#2 in 7-2016, and then into the ostium of the D#2 in 1-2018, and then to the prox-mid D#2 in 3-2017    Presence of drug coated stent in left circumflex coronary artery 01/20/2017    A SHU stent was placed to the proximal OM 1 branch in January of 2017    Severe mitral regurgitation 04/12/2018     Past Surgical History:   Procedure Laterality Date    ABLATION OF ARRHYTHMOGENIC FOCUS FOR SUPRAVENTRICULAR TACHYCARDIA WITH ELECTROPHYSIOLOGY STUDY N/A 10/06/2021    Procedure: Ablation SVT;  Surgeon: Joby Gutiérrez MD;  Location: [facility] EP LAB;  Service: Cardiology;  Laterality: N/A;    ANGIOGRAM, CORONARY, WITH LEFT HEART CATHETERIZATION  04/05/2022    Procedure: Angiogram, Coronary, with Left Heart Cath;  Surgeon: Manav Schneider MD;  Location: [facility] CATH LAB;  Service: Cardiology;;    BREAST BIOPSY Left     BREAST SURGERY Left     left biopsy    CARDIAC CATHETERIZATION      CARDIAC DEFIBRILLATOR PLACEMENT      CARDIAC ELECTROPHYSIOLOGY STUDY WITH DRUG CHALLENGE  10/06/2021    Procedure: EP Drug challenge;  Surgeon: Joby Gutiérrez MD;  Location: [facility] EP LAB;  Service: Cardiology;;    CATHETERIZATION OF BOTH LEFT AND RIGHT HEART N/A 12/10/2019    Procedure:  CATHETERIZATION, HEART, BOTH LEFT AND RIGHT;  Surgeon: LUIS ALFREDO Ernandez MD;  Location: [facility] CATH LAB;  Service: Cardiology;  Laterality: N/A;    TONSILLECTOMY      TRANSESOPHAGEAL ECHOCARDIOGRAPHY  07/11/2016    TRANSESOPHAGEAL ECHOCARDIOGRAPHY N/A 01/10/2022    Procedure: ECHOCARDIOGRAM, TRANSESOPHAGEAL;  Surgeon: Leslie Fishman MD;  Location: [facility] CATH LAB;  Service: Cardiology;  Laterality: N/A;    TRANSESOPHAGEAL ECHOCARDIOGRAPHY N/A 8/17/2022    Procedure: ECHOCARDIOGRAM, TRANSESOPHAGEAL;  Surgeon: Manav Schneider MD;  Location: [facility] CATH LAB;  Service: Cardiology;  Laterality: N/A;    TUBAL LIGATION      VASCULAR SURGERY       Review of patient's allergies indicates:  No Known Allergies    Current Facility-Administered Medications on File Prior to Encounter   Medication    0.9%  NaCl infusion (for blood administration)    0.9%  NaCl infusion (for blood administration)    benzocaine 20 % oral spray    sodium chloride 0.9% flush 10 mL     Current Outpatient Medications on File Prior to Encounter   Medication Sig    alirocumab (PRALUENT PEN) 150 mg/mL PnIj Inject 1 mL (150 mg total) into the skin every 14 (fourteen) days.    atorvastatin (LIPITOR) 40 MG tablet Take 1 tablet by mouth nightly.    calcitRIOL (ROCALTROL) 0.25 MCG Cap Take 1 capsule by mouth once daily.    cloNIDine (CATAPRES) 0.3 MG tablet Take 1 tablet by mouth 2 (two) times daily.    clopidogreL (PLAVIX) 75 mg tablet Take 1 tablet by mouth once daily.    diphenhydrAMINE (BENADRYL) 25 mg capsule Take 1 capsule (25 mg total) by mouth nightly as needed for Itching.    doxazosin (CARDURA) 8 MG Tab Take 1 tablet by mouth nightly.    ENTRESTO  mg per tablet TAKE 1 TABLET(S) BY MOUTH TWO TIMES A DAY    famotidine (PEPCID) 20 MG tablet Take 1 tablet by mouth once daily.    furosemide (LASIX) 20 MG tablet Take 1 tablet by mouth once daily.    hydrALAZINE (APRESOLINE) 50 MG tablet Take 1 tablet by mouth 2 (two) times a day (morning and  before bed).    metoprolol succinate (TOPROL-XL) 100 MG 24 hr tablet Take 1 tablet by mouth once daily.    metroNIDAZOLE (FLAGYL) 500 MG tablet Take 1 tablet in the morning and 1 tablet before bedtime for 7 days.    NIFEdipine (PROCARDIA-XL) 60 MG (OSM) 24 hr tablet Take 1 tablet by mouth once daily.    ondansetron (ZOFRAN-ODT) 4 MG TbDL Take 1 tablet by mouth.    ondansetron (ZOFRAN-ODT) 4 MG TbDL Take 1 tablet by mouth every 8 (eight) hours as needed for up to 7 days.    pantoprazole (PROTONIX) 40 MG tablet Take 1 tablet by mouth once daily.    prochlorperazine (COMPAZINE) 10 MG tablet Take 1 tablet by mouth every 6 (six) hours as needed for up to 7 days.    promethazine (PHENERGAN) 25 MG tablet Take 1 tablet by mouth 4 (four) times daily as needed for nausea for up to 7 days    thiamine (VITAMIN B-1) 100 MG tablet Take 1 tablet (100 mg total) by mouth once daily.    vancomycin (VANCOCIN) 125 MG capsule Take 1 capsule by mouth in the morning and 1capsule by mouth at noon and 1 capsule by mouth in the evening and 1 capsule by mouth before bedtime for 10 days.    amoxicillin-clavulanate 500-125mg (AUGMENTIN) 500-125 mg Tab Take 1 tablet by mouth every morning for 2 days    blood sugar diagnostic (TRUE METRIX GLUCOSE TEST STRIP MISC) True Metrix Glucose Test Strip   USE TO TEST BLOOD SUGAR 3 TIMES DAILY    blood sugar diagnostic Strp Use to test blood sugar 3 (three) times daily before meals.    blood-glucose meter Misc Use as directed    blood-glucose meter Misc Use to test blood sugar as directed    blood-glucose sensor (DEXCOM G7 SENSOR) Gini Apply one to skin as directed and change q 10 days.    bumetanide (BUMEX) 2 MG tablet Take 1 tablet by mouth once daily.    calcitRIOL (ROCALTROL) 0.25 MCG Cap Take 1 capsule by mouth once daily.    calcium acetate,phosphat bind, (PHOSLO) 667 mg tablet Take 1 tablet by mouth in the morning and 1 tablet at noon and 1 tablet in the evening with meals.    epoetin john (PROCRIT)  "20,000 unit/mL injection Inject 1 ml under the skin every two weeks. Administer only if hemoglobin is <10.5.    insulin glargine 100 units/mL SubQ pen Inject 30 Units into the skin nightly.    insulin lispro 100 unit/mL injection Inject 5 Units into the skin 3 (three) times daily with meals. Discard vial 28 days after opening.    insulin lispro 100 unit/mL injection Inject 0-20 Units into the skin 4 (four) times daily before meals and nightly. If BS : 0 units, -200: 4 units, -250: 8 units; -300: 12 units, -350: 16 units, -400: 20 units, BS > 400: 20 units and call your doctor    insulin lispro protamin-lispro (HUMALOG MIX 50-50 KWIKPEN) 100 unit/mL (50-50) InPn Inject 10 Units into the skin 3 (three) times daily before meals.    metoclopramide HCl (REGLAN) 10 MG tablet Take 1 tablet by mouth 4 (four) times daily before meals and nightly.    nitroGLYCERIN (NITROSTAT) 0.4 MG SL tablet Place 1 tablet under the tongue every 5 (five) minutes as needed for chest pain.  Max of 3 tablets in a 15 minute period.    venlafaxine (EFFEXOR-XR) 37.5 MG 24 hr capsule Take 1 capsule by mouth daily    [DISCONTINUED] aspirin 81 MG Chew Take 81 mg by mouth.    [DISCONTINUED] folic acid (FOLVITE) 1 MG tablet Take 5 tablets by mouth daily for 10 days, THEN take 1 tablet daily for 80 days    [DISCONTINUED] insulin syringe-needle U-100 1 mL 31 gauge x 15/64" Syrg Inject 1 Syringe into the skin once daily.    [DISCONTINUED] lancets (ONETOUCH ULTRASOFT LANCETS) Misc Use 1 lancet to check blood sugar 4 (four) times daily.    [DISCONTINUED] losartan (COZAAR) 100 MG tablet Take 1 tablet  by mouth nightly for blood pressure.     Family History       Problem Relation (Age of Onset)    Colon cancer Father    Heart disease Mother    Hypertension Mother, Brother, Brother    Kidney cancer Sister    Leukemia Sister          Tobacco Use    Smoking status: Never    Smokeless tobacco: Never   Substance and Sexual " Activity    Alcohol use: Not Currently    Drug use: Never    Sexual activity: Yes     Partners: Male     Review of Systems   Constitutional:  Negative for chills, diaphoresis, fatigue and fever.   HENT:  Negative for congestion, hearing loss, rhinorrhea, sinus pain and sore throat.    Eyes:  Negative for pain, redness and visual disturbance.   Respiratory:  Negative for cough, chest tightness, shortness of breath and wheezing.    Cardiovascular:  Negative for chest pain, palpitations and leg swelling.   Gastrointestinal:  Negative for abdominal distention, abdominal pain, constipation, diarrhea, nausea and vomiting.   Endocrine: Negative for cold intolerance, heat intolerance and polyuria.   Genitourinary:  Negative for difficulty urinating, dysuria, flank pain, frequency and hematuria.   Musculoskeletal:  Negative for arthralgias, back pain, joint swelling and myalgias.   Skin:  Negative for pallor, rash and wound.   Neurological:  Negative for dizziness, syncope, weakness, light-headedness, numbness and headaches.     Objective:     Vital Signs (Most Recent):  Temp: 98.2 °F (36.8 °C) (12/28/23 2232)  Pulse: 84 (12/28/23 2335)  Resp: 20 (12/28/23 2232)  BP: (!) 162/77 (12/28/23 2232)  SpO2: 99 % (12/28/23 2232) Vital Signs (24h Range):  Temp:  [98.2 °F (36.8 °C)] 98.2 °F (36.8 °C)  Pulse:  [84] 84  Resp:  [20] 20  SpO2:  [99 %] 99 %  BP: (162)/(77) 162/77     Weight: 78.5 kg (173 lb 1 oz)  Body mass index is 27.11 kg/m².     Physical Exam  Constitutional:       General: She is not in acute distress.     Appearance: She is not diaphoretic.   HENT:      Head: Normocephalic and atraumatic.      Right Ear: External ear normal.      Left Ear: External ear normal.      Nose: Nose normal.      Mouth/Throat:      Pharynx: No oropharyngeal exudate.   Eyes:      General: No scleral icterus.     Extraocular Movements: Extraocular movements intact.      Conjunctiva/sclera: Conjunctivae normal.      Pupils: Pupils are equal,  round, and reactive to light.   Neck:      Thyroid: No thyromegaly.      Vascular: No JVD.      Trachea: No tracheal deviation.   Cardiovascular:      Rate and Rhythm: Normal rate and regular rhythm.      Heart sounds: Normal heart sounds. No murmur heard.  Pulmonary:      Effort: No respiratory distress.      Breath sounds: Normal breath sounds. No wheezing or rales.   Chest:      Chest wall: No tenderness.   Abdominal:      General: Bowel sounds are normal. There is no distension.      Palpations: Abdomen is soft.      Tenderness: There is no abdominal tenderness.   Musculoskeletal:         General: No swelling or tenderness. Normal range of motion.      Cervical back: Normal range of motion and neck supple.      Right lower leg: Edema present.      Left lower leg: Edema present.   Skin:     General: Skin is warm and dry.      Coloration: Skin is not pale.      Findings: No erythema or rash.   Neurological:      Mental Status: She is alert and oriented to person, place, and time.      Cranial Nerves: No cranial nerve deficit.      Sensory: No sensory deficit.      Coordination: Coordination normal.   Psychiatric:         Mood and Affect: Mood normal.         Behavior: Behavior normal. Behavior is cooperative.         Thought Content: Thought content normal.         Judgment: Judgment normal.       Significant Labs: All pertinent labs within the past 24 hours have been reviewed.    Significant Imaging: I have reviewed all pertinent imaging results/findings within the past 24 hours.

## 2023-12-29 NOTE — ASSESSMENT & PLAN NOTE
Endocrinology consulted for BG management.   BG goal 140-180    Reviewed CGM data, patient seems well controlled at home  20% reduction in Levemir dosing will convert to BID dosing given T1DM- status.  - Levemir (Insulin Detemir) 12 units BID  - Novolog (Insulin Aspart) 7 units TIDWM and prn for BG excursions MDC SSI (150/25) (Converted patient's scheduled and SSI into a regimen we can accommodate in the hospital, her SSI at home is much more aggressive then we administer inpatient).   - BG checks AC/HS  - Hypoglycemia protocol in place  - If blood glucose greater than 300, please ask patient not to eat food or drink anything other than water until correctional insulin has brought it back below 250    ** Please notify Endocrine for any change and/or advance in diet**  ** Please call Endocrine for any BG related issues **    Discharge Planning:   TBD. Please notify endocrinology prior to discharge.

## 2023-12-29 NOTE — PROGRESS NOTES
"Patient admitted to CSU.  AAO and VS below.  Patient denies chest pain and SOB. Patient currently on RA.  Patient oriented to room.  Call light within reach, bed locked in lowest position, and side rails up x2.  Fall precautions are in place.    Team notified of patients arrival.  Will continue to monitor.         12/28/23 2232   Vital Signs   Temp 98.2 °F (36.8 °C)   Temp Source Oral   Pulse 84   Resp 20   SpO2 99 %   Device (Oxygen Therapy) room air   BP (!) 162/77   MAP (mmHg) 111        Cardiac/Telemetry Details / Alarms   Cardiac/Telemetry Monitor On Yes   Height and Weight   Height 5' 7" (1.702 m)   Height Method Stated   Weight 78.5 kg (173 lb 1 oz)   Weight Method Standard Scale   BSA (Calculated - sq m) 1.93 sq meters   BMI (Calculated) 27.1   Weight in (lb) to have BMI = 25 159.3   Assessments (Pre/Post)   Level of Consciousness (AVPU) alert       "

## 2023-12-29 NOTE — ASSESSMENT & PLAN NOTE
ESRD with trialysis line removed.   Consult nephrology, will discuss access in AM as IJ trialysis line removed at OSH

## 2023-12-29 NOTE — ASSESSMENT & PLAN NOTE
Titrate insulin slowly to avoid hypoglycemia as the risk of hypoglycemia increases with decreased creatinine clearance.  Estimated Creatinine Clearance: 16.3 mL/min (A) (based on SCr of 4.1 mg/dL (H)).

## 2023-12-29 NOTE — SUBJECTIVE & OBJECTIVE
Past Medical History:   Diagnosis Date    Acute hepatitis A     Acute kidney injury 03/07/2018    Acute respiratory failure with hypoxemia 03/13/2020    Arthritis     Cataract     No date or laterality given    Chronic systolic CHF (congestive heart failure), NYHA class 3     Coronary artery disease     Diabetes mellitus     Dialysis patient     Dilated cardiomyopathy     Diverticulosis of intestine 10/02/2013    Gastroparesis 02/24/2014    Hypertension     ICD (implantable cardioverter-defibrillator), biventricular, in situ     Ischemic cardiomyopathy 04/29/2018    Left bundle branch block 04/29/2018    Microcytic hypochromic anemia 03/07/2018    Migraine 01/24/2016    Presence of drug coated stent in LAD coronary artery     A SHU stent was placed to the mid D#2 in 7-2016, and then into the ostium of the D#2 in 1-2018, and then to the prox-mid D#2 in 3-2017    Presence of drug coated stent in left circumflex coronary artery 01/20/2017    A SHU stent was placed to the proximal OM 1 branch in January of 2017    Severe mitral regurgitation 04/12/2018       Past Surgical History:   Procedure Laterality Date    ABLATION OF ARRHYTHMOGENIC FOCUS FOR SUPRAVENTRICULAR TACHYCARDIA WITH ELECTROPHYSIOLOGY STUDY N/A 10/06/2021    Procedure: Ablation SVT;  Surgeon: Joby Gutiérrez MD;  Location: [facility] EP LAB;  Service: Cardiology;  Laterality: N/A;    ANGIOGRAM, CORONARY, WITH LEFT HEART CATHETERIZATION  04/05/2022    Procedure: Angiogram, Coronary, with Left Heart Cath;  Surgeon: Manav Schneider MD;  Location: [facility] CATH LAB;  Service: Cardiology;;    BREAST BIOPSY Left     BREAST SURGERY Left     left biopsy    CARDIAC CATHETERIZATION      CARDIAC DEFIBRILLATOR PLACEMENT      CARDIAC ELECTROPHYSIOLOGY STUDY WITH DRUG CHALLENGE  10/06/2021    Procedure: EP Drug challenge;  Surgeon: Joby Gutiérrez MD;  Location: [facility] EP LAB;  Service: Cardiology;;    CATHETERIZATION OF BOTH LEFT AND RIGHT HEART N/A 12/10/2019    Procedure:  CATHETERIZATION, HEART, BOTH LEFT AND RIGHT;  Surgeon: LUIS ALFREDO Ernandez MD;  Location: [facility] CATH LAB;  Service: Cardiology;  Laterality: N/A;    TONSILLECTOMY      TRANSESOPHAGEAL ECHOCARDIOGRAPHY  07/11/2016    TRANSESOPHAGEAL ECHOCARDIOGRAPHY N/A 01/10/2022    Procedure: ECHOCARDIOGRAM, TRANSESOPHAGEAL;  Surgeon: Leslie Fishman MD;  Location: [facility] CATH LAB;  Service: Cardiology;  Laterality: N/A;    TRANSESOPHAGEAL ECHOCARDIOGRAPHY N/A 8/17/2022    Procedure: ECHOCARDIOGRAM, TRANSESOPHAGEAL;  Surgeon: Manav Schneider MD;  Location: [facility] CATH LAB;  Service: Cardiology;  Laterality: N/A;    TUBAL LIGATION      VASCULAR SURGERY         Review of patient's allergies indicates:  No Known Allergies  Current Facility-Administered Medications   Medication Frequency    albuterol sulfate nebulizer solution 2.5 mg Q4H    albuterol-ipratropium 2.5 mg-0.5 mg/3 mL nebulizer solution 3 mL Q6H PRN    aspirin chewable tablet 81 mg Daily    atorvastatin tablet 40 mg Daily    benzonatate capsule 200 mg Q8H PRN    calcium acetate(phosphat bind) capsule 667 mg TID WM    ceFEPIme (MAXIPIME) 1 g in dextrose 5 % in water (D5W) 100 mL IVPB (MB+) Q24H    [START ON 12/31/2023] DAPTOmycin (CUBICIN) 785 mg in sodium chloride 0.9% SolP 50 mL IVPB Q48H    dextrose 50 % in water (D50W) injection 50 mL PRN    [START ON 12/30/2023] epoetin john injection 7,800 Units Every Tues, Thurs, Sat    furosemide tablet 20 mg Daily    glucagon (human recombinant) injection 1 mg PRN    glucose chewable tablet 16 g PRN    glucose chewable tablet 24 g PRN    heparin (porcine) injection 3,000 Units Daily PRN    heparin 25,000 units in dextrose 5% (100 units/ml) IV bolus from bag - ADDITIONAL PRN BOLUS - 30 units/kg PRN    heparin 25,000 units in dextrose 5% (100 units/ml) IV bolus from bag - ADDITIONAL PRN BOLUS - 60 units/kg PRN    heparin 25,000 units in dextrose 5% 250 mL (100 units/mL) infusion HIGH INTENSITY nomogram - [facility] Continuous     hydrALAZINE injection 10 mg Q8H PRN    hydrALAZINE tablet 25 mg Q8H    insulin aspart U-100 pen 0-10 Units QID (AC + HS) PRN    insulin aspart U-100 pen 5 Units TIDWM    insulin detemir U-100 (Levemir) pen 20 Units Daily    melatonin tablet 3 mg Nightly PRN    metoprolol succinate (TOPROL-XL) 24 hr tablet 50 mg Daily    pantoprazole EC tablet 40 mg Daily    pregabalin capsule 50 mg Daily    promethazine tablet 25 mg Q6H PRN    sacubitriL-valsartan 24-26 mg per tablet 1 tablet BID     Facility-Administered Medications Ordered in Other Encounters   Medication Frequency    0.9%  NaCl infusion (for blood administration) Q24H PRN    0.9%  NaCl infusion (for blood administration) Q24H PRN    benzocaine 20 % oral spray QID PRN    sodium chloride 0.9% flush 10 mL PRN     Family History       Problem Relation (Age of Onset)    Colon cancer Father    Heart disease Mother    Hypertension Mother, Brother, Brother    Kidney cancer Sister    Leukemia Sister          Tobacco Use    Smoking status: Never    Smokeless tobacco: Never   Substance and Sexual Activity    Alcohol use: Not Currently    Drug use: Never    Sexual activity: Yes     Partners: Male     Review of Systems   Constitutional:  Negative for activity change, appetite change, fatigue and fever.   HENT:  Negative for congestion, facial swelling, postnasal drip, rhinorrhea, sinus pressure and sinus pain.    Respiratory:  Negative for cough, chest tightness and shortness of breath.    Cardiovascular:  Negative for chest pain, palpitations and leg swelling.   Gastrointestinal:  Negative for abdominal distention, constipation, diarrhea, nausea and vomiting.   Musculoskeletal:  Negative for arthralgias, gait problem and myalgias.   Skin:  Negative for color change, rash and wound.   Neurological:  Negative for dizziness, light-headedness and headaches.   Psychiatric/Behavioral:  Negative for agitation, behavioral problems and confusion.      Objective:     Vital Signs (Most  Recent):  Temp: 98.2 °F (36.8 °C) (12/29/23 0753)  Pulse: 72 (12/29/23 0820)  Resp: 20 (12/29/23 0820)  BP: (!) 149/69 (12/29/23 0753)  SpO2: 95 % (12/29/23 0820) Vital Signs (24h Range):  Temp:  [98.1 °F (36.7 °C)-98.2 °F (36.8 °C)] 98.2 °F (36.8 °C)  Pulse:  [72-93] 72  Resp:  [16-20] 20  SpO2:  [91 %-99 %] 95 %  BP: (149-184)/(69-83) 149/69     Weight: 78.5 kg (173 lb 1 oz) (12/28/23 2232)  Body mass index is 27.11 kg/m².  Body surface area is 1.93 meters squared.    No intake/output data recorded.     Physical Exam  Vitals and nursing note reviewed.   Constitutional:       General: She is not in acute distress.     Appearance: Normal appearance. She is not ill-appearing or toxic-appearing.   HENT:      Head: Normocephalic and atraumatic.      Nose: No congestion or rhinorrhea.      Mouth/Throat:      Mouth: Mucous membranes are moist.      Pharynx: Oropharynx is clear. No oropharyngeal exudate or posterior oropharyngeal erythema.   Eyes:      General: No scleral icterus.        Right eye: No discharge.         Left eye: No discharge.      Extraocular Movements: Extraocular movements intact.      Conjunctiva/sclera: Conjunctivae normal.   Cardiovascular:      Rate and Rhythm: Normal rate and regular rhythm.      Heart sounds: No murmur heard.     No friction rub. No gallop.      Comments: R fem temporary dialysis catheter  Pulmonary:      Effort: Pulmonary effort is normal. No respiratory distress.      Breath sounds: No wheezing or rales.   Abdominal:      General: Bowel sounds are normal. There is no distension.      Palpations: Abdomen is soft.      Tenderness: There is no abdominal tenderness.   Musculoskeletal:         General: No swelling.      Cervical back: Normal range of motion and neck supple.      Right lower leg: No edema.      Left lower leg: No edema.   Skin:     General: Skin is warm and dry.   Neurological:      General: No focal deficit present.      Mental Status: She is alert and oriented to  person, place, and time.   Psychiatric:         Mood and Affect: Mood normal.         Behavior: Behavior normal.          Significant Labs:  CBC:   Recent Labs   Lab 12/29/23  0917   WBC 5.13   RBC 2.69*   HGB 7.9*   HCT 25.1*   *   MCV 93   MCH 29.4   MCHC 31.5*     CMP:   Recent Labs   Lab 12/29/23  0004   *  309*   CALCIUM 7.6*  7.6*   ALBUMIN 2.2*  2.2*   PROT 5.9*   *  135*   K 4.1  4.1   CO2 26  26   CL 97  97   BUN 21*  21*   CREATININE 4.1*  4.1*   ALKPHOS 60   ALT 8*   AST 8*   BILITOT 0.4

## 2023-12-29 NOTE — ASSESSMENT & PLAN NOTE
-MRSA Bacteremia  -RIJ TDC removed @ OSH, BC remained positive  -JAVY performed, vegetations to AICD lead.

## 2023-12-29 NOTE — HPI
Ellen Roman is a 57 yo female with hx of DM1, HTN, HLD, CAD s/p PCI with AICD placement, Mitral Regurgitation s/p clip, and ESRD on iHD MWF who presents as a transfer from OSH for higher level of care.  According to records, patient initially presented to OSH on 12/18 with chief complaint of R sided chest pain associated with N/V x 2 days with low grade temp.  Cardiology evaluated there and performed a LCH on 12/20 but no PCI was performed and they recommended medical management for her CAD.  Her BC grew MRSA bacteremia that remained positive during the admission with source suspected to be from her RIJ Tunneled dialysis catheter. The TDC was removed and IR was consulted for tempoary line placement and she was found to have a thrombosed LIJ from her previous catheter and a temporary catheter had to be placed to the R femoral vein.  Her BC continued to remain positive despite treatment with Vancomycin and a JAVY was ordered with findings of vegetations to the AICD lead wire and she was then transferred to [facility] for higher level of care.

## 2023-12-29 NOTE — CONSULTS
[facility]  JAVY Consult      Ellen Roman  YOB: 1965  Medical Record Number:  [id number]  Attending Physician:  Yamilet Boyce MD   Date of Admission: 12/28/2023       Hospital Day:  1    Principal Problem:Bacteremia     HPI: Ellen Roman is 58 year old female with PMHx significant for T1DM c/b gastroparesis, retinopathy and nephropathy - ESRD on HD, HTN, HLD, CAD s/p prior PCI with ischemic cardiomyopathy s/p AICD placement, chronic thrombocytopenia, severe mitral regurgitation status post MitraClip, history of fungemia/bacteremia initially presenting to [facility] ER on 12/18 with a complaints of right-sided chest pain associated with nausea and vomiting multiple episodes for the prior 2 days.      She also reported low-grade temperature. Patient reports generalized weakness. Cardiology performed LHC on 12/20/2023 for evaluation of this chest pain with recommendation for medical management for coronary artery disease. Course is then complicated by refractory MRSA bacteremia. HM team initially suspected source of bacteremia was a tunneled dialysis catheter. This was removed upon admission and patient had placement of left IJ. The placement of left IJ trialysis catheter was c/b IJ thrombosis for which she is started on provoked DVT management - currently with lovenox though with ESRD would consider transition to heparin. IR had difficulty placing the right IJ due to thrombosis as well and access subsequently was placed on the right femoral. Due to persistent bacteremia despite vancomycin, patient subsequently had JAVY 12/27/23 with findings reported as positive for vegetations present on the ICD lead wire. As per OSH cardiologist, requested transfer from [facility] to [facility]. 2018 [employer].       Anticoagulant/antiplatelets: hep gtt    Dysphagia or odynophagia:  no  Liver Disease, esophageal disease, or known varices:  no  Upper GI Bleeding: no  Snoring:  no  Sleep  Apnea:  no  Prior neck surgery or radiation:  no  History of anesthetic difficulties:  no  Family history of anesthetic difficulties:  no  Last oral intake: last pm   Able to move neck in all directions: yes  Platelet count: 110  INR: 1.0        Medications - Outpatient  Prior to Admission medications    Medication Sig Start Date End Date Taking? Authorizing Provider   alirocumab (PRALUENT PEN) 150 mg/mL PnIj Inject 1 mL (150 mg total) into the skin every 14 (fourteen) days. 11/3/22  Yes    atorvastatin (LIPITOR) 40 MG tablet Take 1 tablet by mouth nightly. 9/5/23  Yes Paco Amanda MD   calcitRIOL (ROCALTROL) 0.25 MCG Cap Take 1 capsule by mouth once daily. 4/20/22  Yes Zaire Couch MD   cloNIDine (CATAPRES) 0.3 MG tablet Take 1 tablet by mouth 2 (two) times daily. 11/3/22  Yes    clopidogreL (PLAVIX) 75 mg tablet Take 1 tablet by mouth once daily. 10/3/23  Yes Paco Amanda MD   diphenhydrAMINE (BENADRYL) 25 mg capsule Take 1 capsule (25 mg total) by mouth nightly as needed for Itching. 1/19/23  Yes Paco Amanda MD   doxazosin (CARDURA) 8 MG Tab Take 1 tablet by mouth nightly. 8/29/23  Yes Paco Amanda MD   ENTRESTO  mg per tablet TAKE 1 TABLET(S) BY MOUTH TWO TIMES A DAY 8/21/23  Yes Paco Amanda MD   famotidine (PEPCID) 20 MG tablet Take 1 tablet by mouth once daily. 11/3/22  Yes    furosemide (LASIX) 20 MG tablet Take 1 tablet by mouth once daily. 2/21/23  Yes Paco Amanda MD   hydrALAZINE (APRESOLINE) 50 MG tablet Take 1 tablet by mouth 2 (two) times a day (morning and before bed). 9/5/23  Yes Paco Amanda MD   metoprolol succinate (TOPROL-XL) 100 MG 24 hr tablet Take 1 tablet by mouth once daily. 11/3/22  Yes    metroNIDAZOLE (FLAGYL) 500 MG tablet Take 1 tablet in the morning and 1 tablet before bedtime for 7 days. 7/13/23  Yes    NIFEdipine (PROCARDIA-XL) 60 MG (OSM) 24 hr tablet Take 1 tablet by mouth once daily. 10/3/23   Yes Paco Amanda MD   ondansetron (ZOFRAN-ODT) 4 MG TbDL Take 1 tablet by mouth. 10/20/22  Yes Provider, Historical   ondansetron (ZOFRAN-ODT) 4 MG TbDL Take 1 tablet by mouth every 8 (eight) hours as needed for up to 7 days. 7/13/23  Yes    pantoprazole (PROTONIX) 40 MG tablet Take 1 tablet by mouth once daily. 11/3/22  Yes    prochlorperazine (COMPAZINE) 10 MG tablet Take 1 tablet by mouth every 6 (six) hours as needed for up to 7 days. 1/24/23  Yes    promethazine (PHENERGAN) 25 MG tablet Take 1 tablet by mouth 4 (four) times daily as needed for nausea for up to 7 days 6/14/22  Yes    thiamine (VITAMIN B-1) 100 MG tablet Take 1 tablet (100 mg total) by mouth once daily. 9/5/23  Yes Paco Amanda MD   vancomycin (VANCOCIN) 125 MG capsule Take 1 capsule by mouth in the morning and 1capsule by mouth at noon and 1 capsule by mouth in the evening and 1 capsule by mouth before bedtime for 10 days. 11/3/23  Yes    amoxicillin-clavulanate 500-125mg (AUGMENTIN) 500-125 mg Tab Take 1 tablet by mouth every morning for 2 days 11/4/23      blood sugar diagnostic (TRUE METRIX GLUCOSE TEST STRIP MISC) True Metrix Glucose Test Strip   USE TO TEST BLOOD SUGAR 3 TIMES DAILY    Provider, Historical   blood sugar diagnostic Strp Use to test blood sugar 3 (three) times daily before meals. 1/19/23   Paco Amanda MD   blood-glucose meter Misc Use as directed 6/26/20   Jamey Solis MD   blood-glucose meter Misc Use to test blood sugar as directed 1/19/23 1/19/24  Paco Amanda MD   blood-glucose sensor (DEXCOM G7 SENSOR) Gini Apply one to skin as directed and change q 10 days. 9/26/23   Elsa Paz APRN   bumetanide (BUMEX) 2 MG tablet Take 1 tablet by mouth once daily. 5/26/22 8/29/23  Jonny Anthony MD   calcitRIOL (ROCALTROL) 0.25 MCG Cap Take 1 capsule by mouth once daily. 11/3/22      calcium acetate,phosphat bind, (PHOSLO) 667 mg tablet Take 1 tablet by mouth in the morning  "and 1 tablet at noon and 1 tablet in the evening with meals. 9/5/23   Paco Amanda MD   epoetin john (PROCRIT) 20,000 unit/mL injection Inject 1 ml under the skin every two weeks. Administer only if hemoglobin is <10.5. 8/16/22      insulin glargine 100 units/mL SubQ pen Inject 30 Units into the skin nightly. 9/5/23   Paco Amanda MD   insulin lispro 100 unit/mL injection Inject 5 Units into the skin 3 (three) times daily with meals. Discard vial 28 days after opening. 4/17/23      insulin lispro 100 unit/mL injection Inject 0-20 Units into the skin 4 (four) times daily before meals and nightly. If BS : 0 units, -200: 4 units, -250: 8 units; -300: 12 units, -350: 16 units, -400: 20 units, BS > 400: 20 units and call your doctor 9/5/23   Paco Amanda MD   insulin lispro protamin-lispro (HUMALOG MIX 50-50 KWIKPEN) 100 unit/mL (50-50) InPn Inject 10 Units into the skin 3 (three) times daily before meals. 12/1/22   Paco Amanda MD   metoclopramide HCl (REGLAN) 10 MG tablet Take 1 tablet by mouth 4 (four) times daily before meals and nightly. 8/11/23      nitroGLYCERIN (NITROSTAT) 0.4 MG SL tablet Place 1 tablet under the tongue every 5 (five) minutes as needed for chest pain.  Max of 3 tablets in a 15 minute period. 12/2/21 8/29/23  Lita Beck MD   venlafaxine (EFFEXOR-XR) 37.5 MG 24 hr capsule Take 1 capsule by mouth daily 12/2/21 8/29/23  Lita Beck MD   aspirin 81 MG Chew Take 81 mg by mouth. 6/1/22 8/18/22  Provider, Historical   folic acid (FOLVITE) 1 MG tablet Take 5 tablets by mouth daily for 10 days, THEN take 1 tablet daily for 80 days 7/28/23 11/3/23     insulin syringe-needle U-100 1 mL 31 gauge x 15/64" Syrg Inject 1 Syringe into the skin once daily. 12/2/21 8/18/22  Lita Beck MD   lancets (ONETOUCH ULTRASOFT LANCETS) Misc Use 1 lancet to check blood sugar 4 (four) times daily. 2/2/22 8/18/22  Smith Toro, " MD   losartan (COZAAR) 100 MG tablet Take 1 tablet  by mouth nightly for blood pressure. 11/2/22 11/4/22           Medications - Current  Scheduled Meds:   albuterol sulfate  2.5 mg Nebulization Q4H    aspirin  81 mg Oral Daily    atorvastatin  40 mg Oral Daily    calcium acetate(phosphat bind)  667 mg Oral TID WM    ceFEPime (MAXIPIME) IVPB  1 g Intravenous Q24H    [START ON 12/31/2023] DAPTOmycin (CUBICIN) IV (PEDS and ADULTS)  10 mg/kg Intravenous Q48H    [START ON 12/30/2023] epoetin john  100 Units/kg Subcutaneous Every Tues, Thurs, Sat    furosemide  20 mg Oral Daily    hydrALAZINE  25 mg Oral Q8H    insulin aspart U-100  5 Units Subcutaneous TIDWM    insulin detemir U-100  20 Units Subcutaneous Daily    metoprolol succinate  50 mg Oral Daily    pantoprazole  40 mg Oral Daily    pregabalin  50 mg Oral Daily    sacubitriL-valsartan  1 tablet Oral BID     Continuous Infusions:   heparin (porcine) in D5W 18 Units/kg/hr (12/29/23 0949)     PRN Meds:.albuterol-ipratropium, benzonatate, dextrose 50 % in water (D50W), glucagon (human recombinant), glucose, glucose, heparin (porcine), heparin (PORCINE), heparin (PORCINE), hydrALAZINE, insulin aspart U-100, melatonin, promethazine      Allergies  Review of patient's allergies indicates:  No Known Allergies      Past Medical History  Past Medical History:   Diagnosis Date    Acute hepatitis A     Acute kidney injury 03/07/2018    Acute respiratory failure with hypoxemia 03/13/2020    Arthritis     Cataract     No date or laterality given    Chronic systolic CHF (congestive heart failure), NYHA class 3     Coronary artery disease     Diabetes mellitus     Dialysis patient     Dilated cardiomyopathy     Diverticulosis of intestine 10/02/2013    Gastroparesis 02/24/2014    Hypertension     ICD (implantable cardioverter-defibrillator), biventricular, in situ     Ischemic cardiomyopathy 04/29/2018    Left bundle branch block 04/29/2018    Microcytic hypochromic anemia  03/07/2018    Migraine 01/24/2016    Presence of drug coated stent in LAD coronary artery     A SHU stent was placed to the mid D#2 in 7-2016, and then into the ostium of the D#2 in 1-2018, and then to the prox-mid D#2 in 3-2017    Presence of drug coated stent in left circumflex coronary artery 01/20/2017    A SHU stent was placed to the proximal OM 1 branch in January of 2017    Severe mitral regurgitation 04/12/2018         Past Surgical History  Past Surgical History:   Procedure Laterality Date    ABLATION OF ARRHYTHMOGENIC FOCUS FOR SUPRAVENTRICULAR TACHYCARDIA WITH ELECTROPHYSIOLOGY STUDY N/A 10/06/2021    Procedure: Ablation SVT;  Surgeon: Joby Gutiérrez MD;  Location: [facility] EP LAB;  Service: Cardiology;  Laterality: N/A;    ANGIOGRAM, CORONARY, WITH LEFT HEART CATHETERIZATION  04/05/2022    Procedure: Angiogram, Coronary, with Left Heart Cath;  Surgeon: Manav Schneider MD;  Location: [facility] CATH LAB;  Service: Cardiology;;    BREAST BIOPSY Left     BREAST SURGERY Left     left biopsy    CARDIAC CATHETERIZATION      CARDIAC DEFIBRILLATOR PLACEMENT      CARDIAC ELECTROPHYSIOLOGY STUDY WITH DRUG CHALLENGE  10/06/2021    Procedure: EP Drug challenge;  Surgeon: Joby Gutiérrez MD;  Location: [facility] EP LAB;  Service: Cardiology;;    CATHETERIZATION OF BOTH LEFT AND RIGHT HEART N/A 12/10/2019    Procedure: CATHETERIZATION, HEART, BOTH LEFT AND RIGHT;  Surgeon: LUIS ALFREDO Ernandez MD;  Location: [facility] CATH LAB;  Service: Cardiology;  Laterality: N/A;    TONSILLECTOMY      TRANSESOPHAGEAL ECHOCARDIOGRAPHY  07/11/2016    TRANSESOPHAGEAL ECHOCARDIOGRAPHY N/A 01/10/2022    Procedure: ECHOCARDIOGRAM, TRANSESOPHAGEAL;  Surgeon: Leslie Fishman MD;  Location: [facility] CATH LAB;  Service: Cardiology;  Laterality: N/A;    TRANSESOPHAGEAL ECHOCARDIOGRAPHY N/A 8/17/2022    Procedure: ECHOCARDIOGRAM, TRANSESOPHAGEAL;  Surgeon: Manav Schneider MD;  Location: [facility] CATH LAB;  Service: Cardiology;  Laterality: N/A;    TUBAL LIGATION       VASCULAR SURGERY           Social History  Social History     Socioeconomic History    Marital status:     Number of children: 5   Tobacco Use    Smoking status: Never    Smokeless tobacco: Never   Substance and Sexual Activity    Alcohol use: Not Currently    Drug use: Never    Sexual activity: Yes     Partners: Male   Social History Narrative    She lives with her boy friend at home.      Social Determinants of Health     Financial Resource Strain: Low Risk  (3/17/2023)    Overall Financial Resource Strain (CARDIA)     Difficulty of Paying Living Expenses: Not hard at all   Food Insecurity: No Food Insecurity (3/17/2023)    Hunger Vital Sign     Worried About Running Out of Food in the Last Year: Never true     Ran Out of Food in the Last Year: Never true   Transportation Needs: No Transportation Needs (3/17/2023)    PRAPARE - Transportation     Lack of Transportation (Medical): No     Lack of Transportation (Non-Medical): No   Physical Activity: Unknown (3/17/2023)    Exercise Vital Sign     Days of Exercise per Week: 1 day   Stress: No Stress Concern Present (3/17/2023)    [employer] [place] [employer] - Occupational Stress Questionnaire     Feeling of Stress : Not at all   Social Connections: Unknown (3/17/2023)    Social Connection and Isolation Panel [NHANES]     Frequency of Communication with Friends and Family: More than three times a week     Frequency of Social Gatherings with Friends and Family: Once a week     Active Member of Clubs or Organizations: No     Attends Club or Organization Meetings: Never     Marital Status:    Housing Stability: Unknown (3/17/2023)    Housing Stability Vital Sign     Unable to Pay for Housing in the Last Year: No     Unstable Housing in the Last Year: No         ROS  10 point ROS performed and negative except as stated in HPI     Physical Examination         Vital Signs  Vitals  Temp: 98.2 °F (36.8 °C)  Temp Source: Temporal  Pulse: 72  Heart  "Rate Source: Monitor  Resp: 20  SpO2: 95 %  Pulse Oximetry Type: Intermittent  BP: (!) 149/69  MAP (mmHg): 99  BP Location: Left arm  BP Method: Automatic  Patient Position: Lying          24 Hour VS Range    Temp:  [98.1 °F (36.7 °C)-98.2 °F (36.8 °C)]   Pulse:  [72-93]   Resp:  [16-20]   BP: (149-184)/(69-83)   SpO2:  [91 %-99 %]   No intake or output data in the 24 hours ending 12/29/23 1000      Physical Exam:   Constitutional: no acute distress  HEENT: NCAT, EOMI, no scleral icterus  Cardiovascular: Regular rate and rhythm  Pulmonary: Normal respiratory effort   Abdomen: nontender, non-distended   Neuro: alert and oriented, no focal deficits  Extremities: warm, no edema   MSK: no deformities  Integument: intact, no rashes       Data       Recent Labs   Lab 12/29/23  0004 12/29/23  0545 12/29/23  0917   WBC 5.08  4.86 5.78 5.13   HGB 7.4*  7.6* 7.7* 7.9*   HCT 23.2*  23.6* 24.7* 25.1*   *  105* 109* 110*        Recent Labs   Lab 12/26/23  0540 12/29/23  0004 12/29/23  0917   INR 1.2 1.0 1.0        Recent Labs   Lab 12/29/23  0004   *  135*   K 4.1  4.1   CL 97  97   CO2 26  26   BUN 21*  21*   CREATININE 4.1*  4.1*   ANIONGAP 12  12   CALCIUM 7.6*  7.6*        Recent Labs   Lab 12/29/23  0004   PROT 5.9*   ALBUMIN 2.2*  2.2*   BILITOT 0.4   ALKPHOS 60   AST 8*   ALT 8*        No results for input(s): "TROPONINI" in the last 168 hours.     BNP   Date Value   12/11/2023 4,227 pg/mL (H)   11/16/2023 3,418 PG/ML (H)   10/07/2023 879 PG/ML (H)   09/14/2023 1,190 PG/ML (H)   08/09/2023 3,439 PG/ML (H)   06/06/2023 4,408 PG/ML (H)       Recent Labs   Lab 12/29/23  0004   LABBLOO No Growth to date  No Growth to date            Assessment & Plan     #Infective endocarditis  -JAVY requested prior to lead extraction to define the anatomy better    -No absolute contraindications of esophageal stricture, tumor, perforation, laceration,or diverticulum and/or active GI bleed  -The risks, benefits & " alternatives of the procedure were explained to the patient.   -The risks of transesophageal echo include but are not limited to:  Dental trauma, esophageal trauma/perforation, bleeding, laryngospasm/brochospasm, aspiration, sore throat/hoarseness, & dislodgement of the endotracheal tube/nasogastric tube (where applicable).    -The risks of moderate sedation include hypotension, respiratory depression, arrhythmias, bronchospasm, & death.    -Informed consent was obtained. The patient is agreeable to proceed with the procedure and all questions and concerns addressed    Sixto Cordero MD  [facility]   Cardiovascular Disease PGY-V

## 2023-12-29 NOTE — ASSESSMENT & PLAN NOTE
ESRD on iHD MWF  [facility]  4 hour treatments  EDW of 73 kg    Temporary line to R femoral, reported that patient has thrombosed RIJ where TDC was previously.  AICD to the L.    Plan/Recommendations:  -Had HD prior to transfer with 2L removed per patient report.  Labs non-emergent.  -Will plan for HD tomorrow, no emergent need for further treatments today  -Defer tunneled dialysis catheter placement at this time, will need prior to discharge home.  Likely will need to be placed to the femoral area.  -discussed with patient, will need to work on long term dialysis access (AVF vs AVG) as OP, reports that she was trying to get arranged for peritoneal dialysis.    -renal diet when advanced, 1.5L fluid restrictions  -will continue following for HD needs while IP.

## 2023-12-29 NOTE — PLAN OF CARE
Problem: Adult Inpatient Plan of Care  Goal: Plan of Care Review  Outcome: Ongoing, Progressing   Patient free from falls and injuries throughout shift.  AAO and VSS.  Patient denies chest pain and SOB.  Blood glucose managed through meals and insulin; 's.  Patient continues on heparin gtt and IV Abx. No acute events overnight. Patient resting well at this time.  Plan of care discussed with patient.  Patient verbalizes understanding.  Will continue to monitor.

## 2023-12-29 NOTE — ANESTHESIA PREPROCEDURE EVALUATION
12/29/2023  Ellen Roman is a 58 y.o., female.    Pre-operative evaluation for Procedure(s) (LRB):  Transesophageal echo (JAVY) intra-procedure log documentation (N/A)    Ellen Roman is a 58 y.o. female     Patient Active Problem List   Diagnosis    Primary hypertension    Coronary artery disease involving native coronary artery of native heart without angina pectoris    Ischemic dilated cardiomyopathy    LBBB (left bundle branch block)    S/P coronary angioplasty    Nonrheumatic mitral valve regurgitation    Migraine    Cardiac resynchronization therapy defibrillator (CRT-D) in place    CHF (congestive heart failure)    MARIAM (acute kidney injury)    Arthritis    Cataract    Pulmonary HTN    Nonrheumatic tricuspid valve regurgitation    Atherosclerotic renal artery stenosis    Nonintractable headache    Acute hypoxemic respiratory failure    Patient cannot afford medications/ Noncompliance     E. coli UTI    Mixed sleep apnea    Chronic anticoagulation    Diabetes mellitus due to underlying condition with both eyes affected by severe nonproliferative retinopathy without macular edema, without long-term current use of insulin    Nuclear sclerotic cataract of both eyes    Anemia    Chronic systolic heart failure    SVT (supraventricular tachycardia)    S/P catheter ablation of slow pathway    Edema of left upper arm    Hyperkalemia    Depression    Combined form of age-related cataract, both eyes    Hypertensive retinopathy of both eyes    Dry eye syndrome of both eyes    GERD (gastroesophageal reflux disease)    CKD stage 5 due to type 2 diabetes mellitus    Anemia, iron deficiency    Elevated troponin    Hypotension    Nephrotic syndrome    Secondary hyperparathyroidism    Metabolic acidosis    ESRD (end stage renal disease)    Bacteremia    Anemia due to stage 5 chronic kidney disease, not on chronic dialysis     Chronic kidney disease-mineral and bone disorder    Infection associated with cardiac device    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), managed as type 1       Review of patient's allergies indicates:  No Known Allergies    Current Facility-Administered Medications on File Prior to Encounter   Medication Dose Route Frequency Provider Last Rate Last Admin    0.9%  NaCl infusion (for blood administration)   Intravenous Q24H PRN Manav Schneider MD        0.9%  NaCl infusion (for blood administration)   Intravenous Q24H PRN Manav Schneider MD        benzocaine 20 % oral spray   Mouth/Throat QID PRN Leslie Fishman MD        sodium chloride 0.9% flush 10 mL  10 mL Intravenous PRN Manav Schneider MD         Current Outpatient Medications on File Prior to Encounter   Medication Sig Dispense Refill    alirocumab (PRALUENT PEN) 150 mg/mL PnIj Inject 1 mL (150 mg total) into the skin every 14 (fourteen) days. 2 each 11    atorvastatin (LIPITOR) 40 MG tablet Take 1 tablet by mouth nightly. 90 tablet 1    calcitRIOL (ROCALTROL) 0.25 MCG Cap Take 1 capsule by mouth once daily. 90 capsule 3    cloNIDine (CATAPRES) 0.3 MG tablet Take 1 tablet by mouth 2 (two) times daily. 180 tablet 3    clopidogreL (PLAVIX) 75 mg tablet Take 1 tablet by mouth once daily. 90 tablet 3    diphenhydrAMINE (BENADRYL) 25 mg capsule Take 1 capsule (25 mg total) by mouth nightly as needed for Itching. 30 capsule 11    doxazosin (CARDURA) 8 MG Tab Take 1 tablet by mouth nightly. 30 tablet 11    ENTRESTO  mg per tablet TAKE 1 TABLET(S) BY MOUTH TWO TIMES A  tablet 3    famotidine (PEPCID) 20 MG tablet Take 1 tablet by mouth once daily. 90 tablet 3    furosemide (LASIX) 20 MG tablet Take 1 tablet by mouth once daily. 90 tablet 3    hydrALAZINE (APRESOLINE) 50 MG tablet Take 1 tablet by mouth 2 (two) times a day (morning and before bed). 60 tablet 1    metoprolol succinate (TOPROL-XL) 100 MG 24 hr tablet Take 1 tablet by mouth  once daily. 90 tablet 3    metroNIDAZOLE (FLAGYL) 500 MG tablet Take 1 tablet in the morning and 1 tablet before bedtime for 7 days. 14 tablet 0    NIFEdipine (PROCARDIA-XL) 60 MG (OSM) 24 hr tablet Take 1 tablet by mouth once daily. 90 tablet 3    ondansetron (ZOFRAN-ODT) 4 MG TbDL Take 1 tablet by mouth.      ondansetron (ZOFRAN-ODT) 4 MG TbDL Take 1 tablet by mouth every 8 (eight) hours as needed for up to 7 days. 10 tablet 0    pantoprazole (PROTONIX) 40 MG tablet Take 1 tablet by mouth once daily. 90 tablet 3    prochlorperazine (COMPAZINE) 10 MG tablet Take 1 tablet by mouth every 6 (six) hours as needed for up to 7 days. 25 tablet 0    promethazine (PHENERGAN) 25 MG tablet Take 1 tablet by mouth 4 (four) times daily as needed for nausea for up to 7 days 15 tablet 0    thiamine (VITAMIN B-1) 100 MG tablet Take 1 tablet (100 mg total) by mouth once daily. 90 tablet 3    vancomycin (VANCOCIN) 125 MG capsule Take 1 capsule by mouth in the morning and 1capsule by mouth at noon and 1 capsule by mouth in the evening and 1 capsule by mouth before bedtime for 10 days. 40 capsule 0    amoxicillin-clavulanate 500-125mg (AUGMENTIN) 500-125 mg Tab Take 1 tablet by mouth every morning for 2 days 2 tablet 0    blood sugar diagnostic (TRUE METRIX GLUCOSE TEST STRIP MISC) True Metrix Glucose Test Strip   USE TO TEST BLOOD SUGAR 3 TIMES DAILY      blood sugar diagnostic Strp Use to test blood sugar 3 (three) times daily before meals. 100 strip 11    blood-glucose meter Misc Use as directed 1 each 1    blood-glucose meter Misc Use to test blood sugar as directed 1 each 0    blood-glucose sensor (DEXCOM G7 SENSOR) Gini Apply one to skin as directed and change q 10 days. 3 each 11    bumetanide (BUMEX) 2 MG tablet Take 1 tablet by mouth once daily. 30 tablet 11    calcitRIOL (ROCALTROL) 0.25 MCG Cap Take 1 capsule by mouth once daily. 90 capsule 3    calcium acetate,phosphat bind, (PHOSLO) 667 mg tablet Take 1 tablet by mouth in  "the morning and 1 tablet at noon and 1 tablet in the evening with meals. 270 tablet 0    epoetin john (PROCRIT) 20,000 unit/mL injection Inject 1 ml under the skin every two weeks. Administer only if hemoglobin is <10.5. 2 mL 5    insulin glargine 100 units/mL SubQ pen Inject 30 Units into the skin nightly. 15 mL 0    insulin lispro 100 unit/mL injection Inject 5 Units into the skin 3 (three) times daily with meals. Discard vial 28 days after opening. 10 mL 0    insulin lispro 100 unit/mL injection Inject 0-20 Units into the skin 4 (four) times daily before meals and nightly. If BS : 0 units, -200: 4 units, -250: 8 units; -300: 12 units, -350: 16 units, -400: 20 units, BS > 400: 20 units and call your doctor 20 mL 0    insulin lispro protamin-lispro (HUMALOG MIX 50-50 KWIKPEN) 100 unit/mL (50-50) InPn Inject 10 Units into the skin 3 (three) times daily before meals. 15 mL 11    metoclopramide HCl (REGLAN) 10 MG tablet Take 1 tablet by mouth 4 (four) times daily before meals and nightly. 40 tablet 0    nitroGLYCERIN (NITROSTAT) 0.4 MG SL tablet Place 1 tablet under the tongue every 5 (five) minutes as needed for chest pain.  Max of 3 tablets in a 15 minute period. 25 tablet 1    venlafaxine (EFFEXOR-XR) 37.5 MG 24 hr capsule Take 1 capsule by mouth daily 30 capsule 11    [DISCONTINUED] aspirin 81 MG Chew Take 81 mg by mouth.      [DISCONTINUED] folic acid (FOLVITE) 1 MG tablet Take 5 tablets by mouth daily for 10 days, THEN take 1 tablet daily for 80 days 130 tablet 0    [DISCONTINUED] insulin syringe-needle U-100 1 mL 31 gauge x 15/64" Syrg Inject 1 Syringe into the skin once daily. 100 each 3    [DISCONTINUED] lancets (ONETOUCH ULTRASOFT LANCETS) Misc Use 1 lancet to check blood sugar 4 (four) times daily. 100 each 3    [DISCONTINUED] losartan (COZAAR) 100 MG tablet Take 1 tablet  by mouth nightly for blood pressure. 90 tablet 4       Past Surgical History:   Procedure Laterality " Date    ABLATION OF ARRHYTHMOGENIC FOCUS FOR SUPRAVENTRICULAR TACHYCARDIA WITH ELECTROPHYSIOLOGY STUDY N/A 10/06/2021    Procedure: Ablation SVT;  Surgeon: Joby Gutiérrez MD;  Location: [facility] EP LAB;  Service: Cardiology;  Laterality: N/A;    ANGIOGRAM, CORONARY, WITH LEFT HEART CATHETERIZATION  04/05/2022    Procedure: Angiogram, Coronary, with Left Heart Cath;  Surgeon: Manav Schneider MD;  Location: [facility] CATH LAB;  Service: Cardiology;;    BREAST BIOPSY Left     BREAST SURGERY Left     left biopsy    CARDIAC CATHETERIZATION      CARDIAC DEFIBRILLATOR PLACEMENT      CARDIAC ELECTROPHYSIOLOGY STUDY WITH DRUG CHALLENGE  10/06/2021    Procedure: EP Drug challenge;  Surgeon: Joby Gutiérrez MD;  Location: [facility] EP LAB;  Service: Cardiology;;    CATHETERIZATION OF BOTH LEFT AND RIGHT HEART N/A 12/10/2019    Procedure: CATHETERIZATION, HEART, BOTH LEFT AND RIGHT;  Surgeon: LUIS ALFREDO Ernandez MD;  Location: [facility] CATH LAB;  Service: Cardiology;  Laterality: N/A;    TONSILLECTOMY      TRANSESOPHAGEAL ECHOCARDIOGRAPHY  07/11/2016    TRANSESOPHAGEAL ECHOCARDIOGRAPHY N/A 01/10/2022    Procedure: ECHOCARDIOGRAM, TRANSESOPHAGEAL;  Surgeon: Leslie Fishman MD;  Location: [facility] CATH LAB;  Service: Cardiology;  Laterality: N/A;    TRANSESOPHAGEAL ECHOCARDIOGRAPHY N/A 8/17/2022    Procedure: ECHOCARDIOGRAM, TRANSESOPHAGEAL;  Surgeon: Manav Schneider MD;  Location: [facility] CATH LAB;  Service: Cardiology;  Laterality: N/A;    TUBAL LIGATION      VASCULAR SURGERY         Social History     Socioeconomic History    Marital status:     Number of children: 5   Tobacco Use    Smoking status: Never    Smokeless tobacco: Never   Substance and Sexual Activity    Alcohol use: Not Currently    Drug use: Never    Sexual activity: Yes     Partners: Male   Social History Narrative    She lives with her boy friend at home.      Social Determinants of Health     Financial Resource Strain: Low Risk  (12/29/2023)    Overall Financial Resource  Strain (CARDIA)     Difficulty of Paying Living Expenses: Not hard at all   Food Insecurity: No Food Insecurity (12/29/2023)    Hunger Vital Sign     Worried About Running Out of Food in the Last Year: Never true     Ran Out of Food in the Last Year: Never true   Transportation Needs: No Transportation Needs (12/29/2023)    PRAPARE - Transportation     Lack of Transportation (Medical): No     Lack of Transportation (Non-Medical): No   Physical Activity: Insufficiently Active (12/29/2023)    Exercise Vital Sign     Days of Exercise per Week: 3 days     Minutes of Exercise per Session: 20 min   Stress: No Stress Concern Present (12/29/2023)    [employer] [place] [employer] - Occupational Stress Questionnaire     Feeling of Stress : Not at all   Social Connections: Socially Isolated (12/29/2023)    Social Connection and Isolation Panel [NHANES]     Frequency of Communication with Friends and Family: More than three times a week     Frequency of Social Gatherings with Friends and Family: Once a week     Attends Religion Services: Never     Active Member of Clubs or Organizations: No     Attends Club or Organization Meetings: Never     Marital Status:    Housing Stability: Low Risk  (12/29/2023)    Housing Stability Vital Sign     Unable to Pay for Housing in the Last Year: No     Number of Places Lived in the Last Year: 1     Unstable Housing in the Last Year: No         CBC:   Recent Labs     12/29/23  0545 12/29/23  0917   WBC 5.78 5.13   RBC 2.67* 2.69*   HGB 7.7* 7.9*   HCT 24.7* 25.1*   * 110*   MCV 93 93   MCH 28.8 29.4   MCHC 31.2* 31.5*       CMP:   Recent Labs     12/29/23  0004   *  135*   K 4.1  4.1   CL 97  97   CO2 26  26   BUN 21*  21*   CREATININE 4.1*  4.1*   *  309*   PHOS 3.5   CALCIUM 7.6*  7.6*   ALBUMIN 2.2*  2.2*   PROT 5.9*   ALKPHOS 60   ALT 8*   AST 8*   BILITOT 0.4       INR  Recent Labs     12/29/23  0004 12/29/23  0545 12/29/23  0708  23  0917   INR 1.0  --   --  1.0   APTT 25.9 28.5 28.0 29.8           Diagnostic Studies:      EKD Echo:  No results found for this or any previous visit.        Pre-op Assessment    I have reviewed the Patient Summary Reports.    I have reviewed the NPO Status.   I have reviewed the Medications.     Review of Systems  Anesthesia Hx:  No problems with previous Anesthesia               Denies Personal Hx of Anesthesia complications.                    Cardiovascular:  Exercise tolerance: poor   Hypertension   CAD    Dysrhythmias   CHF                                 Pulmonary:        Sleep Apnea                Renal/:  Chronic Renal Disease                Hepatic/GI:     GERD Liver Disease,            Neurological:    Denies CVA.    Denies Seizures.                                Endocrine:  Diabetes               Physical Exam  General: Cooperative, Alert and Oriented    Airway:  Mallampati: III   Mouth Opening: Small, but > 3cm  TM Distance: 4 - 6 cm  Tongue: Normal  Neck ROM: Normal ROM    Dental:  Edentulous        Anesthesia Plan  Type of Anesthesia, risks & benefits discussed:    Anesthesia Type: Gen Natural Airway  Intra-op Monitoring Plan: Standard ASA Monitors  Induction:  IV  Informed Consent: Informed consent signed with the Patient and all parties understand the risks and agree with anesthesia plan.  All questions answered.   ASA Score: 4  Day of Surgery Review of History & Physical: H&P Update referred to the surgeon/provider.    Ready For Surgery From Anesthesia Perspective.     .

## 2023-12-29 NOTE — ASSESSMENT & PLAN NOTE
#CRT-D in place  #Infection associated with cardiac device  58yoF with T1DM, ESRD on HD, Cad s/p PCI, iCM with prior EF 25-30% recovered to 55-60% with CRT-D in place (dual-coiled 2018 [employer] CRT-D), severe MR s/p MitraClip, and chronic thrombocytopenia is here as a transfer with bacteremia, vegetation involving her RV lead per outside JAVY. Unable to get good visualization with current imaging. Discussed with echo staff regarding need for further imaging lead extraction.     Recommendations:  - JAVY today  - Surface echo as well  - We will plan on lead extraction next week pending imaging  - Formal device interrogation today to evaluate pacemaker dependency, age/type of leads

## 2023-12-30 NOTE — PROGRESS NOTES
Patient arrived in a wheelchair to dialysis unit.   Report received from Brent Lopez RN  VS's per dialysis Flowsheet.    ESRD on outpatient MWF schedule.  Hemodialysis initiated using the following:    Dialysis Access: right femoral trialysis    Aspirated, flushed, and accessed using aseptic technique.    Arrived with and will maintain:  Heparin  infusion at 21 unit/kg/hr at 14.4 ml/hr.    Will Maintain telemetry and blood pressure monitoring throughout treatment.  Refer to dialysis flowsheet and MAR for details.

## 2023-12-30 NOTE — PROGRESS NOTES
Hemodialysis treatment completed.    Treatment time received: 3.5 hours    Net fluid removed: 3  liters    Medications received: epoetin per MAR    Departed with:  Heparin  infusion at 21 unit/kg/hr at 14.4 ml/hr    No issues during HD treatment.     Blood returned, heparin locked ports per order, capped, and taped.    Report given to Brent Lopez RN  Refer to dialysis flowsheet and MAR for details.  Patient transported back in wheelchair from Dialysis to primary unit.

## 2023-12-30 NOTE — PROGRESS NOTES
[facility] - Cardiology Stepdown  Infectious Disease  Progress Note    Patient Name: Ellen Roman  MRN: [id number]  Admission Date: 12/28/2023  Length of Stay: 2 days  Attending Physician: Yamilet Boyce MD  Primary Care Provider: Paco Amanda MD    Isolation Status: No active isolations  Assessment/Plan:      ID  * MRSA bacteremia  Secondary to AICD infection.   Management as in AICD infection.  Pending EP management.     Infection associated with cardiac device  I have reviewed hospital notes from   service and other specialty providers. I have also reviewed CBC, CMP/BMP,  cultures and imaging with my interpretation as documented.     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 Blood cultures on 12/8 with C perfringes and MRSA. Repeat blood cultures 12/18-27 with MRSA. Patient is afebrile and without leukocytosis. Repeat blood cultures collected in [facility] on 12/29 are NGTD. JAVY with vegetation per discussion with teams.   Continue cefepime as prior blood cultures showed C perfringes and prior HD catheter had grown Klebsiella.   Continue daptomycin.  CK level in AM.  Antibiotic lock therapy with vancomycin, if able, for femoral HD catheter.  If unable to clear bacteremia post device extraction then may need to consider femoral catheter removal or exchange.  Repeat blood cultures tomorrow morning.   Of note, patient has voiced not wanting to return to Rehab for antibiotics. Will see if patient is able to receive vancomycin with HD at discharge.  Discussed management plan with the staff and/or members from  service.          Anticipated Disposition: per primary    Thank you for your consult. I will follow-up with patient. Please contact us if you have any additional questions.    Karen Kurtz MD  Infectious Disease  [facility] - Cardiology Stepdown    55 minutes of total time spent on the encounter, which includes face to face time and non-face to face time preparing  "to see the patient (eg, review of tests), obtaining and/or reviewing separately obtained history, documenting clinical information in the electronic or other health record, independently interpreting results (not separately reported) and communicating results to the patient/family/caregiver, or care coordination (not separately reported).     Subjective:     Principal Problem:MRSA bacteremia    HPI: A 58-year-old woman with DM1, diabetic gastroparesis, diabetic retinopathy, diabetic nephropathy, ESRD on HD (MWF), CAD, HTN, s/p AICD, s/p mitral clip due to severe MR, chronic thrombocytopenia, and recent C tropicalis fungemia with positive HD catheter for K pneumoniae (October) who was transferred from [facility] for further management in the setting of refractory MRSA bacteremia and ACID lead vegetation.     Of note, Mrs. Roman was seen in [facility] ED on 12/18 at which time samples were collected for culture and she was subsequently discharged Blood cultures collected on 12/8 became positive for MRSA and C perfringens. She returned to [facility] on 12/18 at which time she was admitted for further management. Cultures collected from 12/18-12/27 are positive for MRSA. During her admission at [facility] she was diagnosed and treated for left IJ DVT, LUE cellulitis, DM1. Work up also revealed evidence of a large vegetation attached to the AICD lead across the TV.     Infectious Diseases consulted for "Bacteremia with concern for ICD lead infection. Transferred on daptomycin and cefepime. "    Interval History: "OK". No acute events overnight. JAVY confirms vegetation on lead. Blood cultures repeated at [facility] positive for MRSA.    Review of Systems   Constitutional:  Negative for chills, fatigue, fever and unexpected weight change.   HENT:  Negative for ear pain, facial swelling, hearing loss, mouth sores, nosebleeds, rhinorrhea, sinus pressure, sore throat, tinnitus, trouble swallowing and voice change.    Eyes:  Negative for " photophobia, pain, redness and visual disturbance.   Respiratory:  Negative for cough, chest tightness, shortness of breath and wheezing.    Cardiovascular:  Negative for chest pain, palpitations and leg swelling.   Gastrointestinal:  Negative for abdominal pain, blood in stool, constipation, diarrhea, nausea and vomiting.   Endocrine: Negative for cold intolerance, heat intolerance, polydipsia, polyphagia and polyuria.   Genitourinary:  Negative for decreased urine volume, dysuria, flank pain, frequency, hematuria, menstrual problem, urgency, vaginal bleeding, vaginal discharge and vaginal pain.   Musculoskeletal:  Negative for arthralgias, back pain, joint swelling, myalgias and neck pain.   Skin:  Negative for rash.   Allergic/Immunologic: Negative for environmental allergies, food allergies and immunocompromised state.   Neurological:  Negative for dizziness, seizures, syncope, weakness, light-headedness, numbness and headaches.   Hematological:  Negative for adenopathy. Does not bruise/bleed easily.   Psychiatric/Behavioral:  Negative for confusion, hallucinations, self-injury, sleep disturbance and suicidal ideas. The patient is not nervous/anxious.      Objective:     Vital Signs (Most Recent):  Temp: 98.6 °F (37 °C) (12/30/23 1309)  Pulse: 77 (12/30/23 1532)  Resp: 18 (12/30/23 1532)  BP: (!) 158/79 (12/30/23 1400)  SpO2: 96 % (12/30/23 1532) Vital Signs (24h Range):  Temp:  [97.5 °F (36.4 °C)-99 °F (37.2 °C)] 98.6 °F (37 °C)  Pulse:  [77-97] 77  Resp:  [15-20] 18  SpO2:  [93 %-98 %] 96 %  BP: (140-184)/(65-87) 158/79     Weight: 78.5 kg (173 lb)  Body mass index is 27.1 kg/m².    Estimated Creatinine Clearance: 18.6 mL/min (A) (based on SCr of 3.6 mg/dL (H)).     Physical Exam  Vitals and nursing note reviewed.   Constitutional:       Appearance: She is well-developed.   HENT:      Head: Normocephalic and atraumatic.      Right Ear: External ear normal.      Left Ear: External ear normal.   Eyes:       General: No scleral icterus.        Right eye: No discharge.         Left eye: No discharge.      Conjunctiva/sclera: Conjunctivae normal.   Cardiovascular:      Rate and Rhythm: Normal rate and regular rhythm.      Heart sounds: Murmur heard.      No friction rub. No gallop.   Pulmonary:      Effort: Pulmonary effort is normal. No respiratory distress.      Breath sounds: Normal breath sounds. No stridor. No wheezing or rales.   Abdominal:      General: Bowel sounds are normal.   Musculoskeletal:         General: No tenderness. Normal range of motion.   Skin:     General: Skin is warm and dry.   Neurological:      Mental Status: She is alert and oriented to person, place, and time.          Significant Labs: Blood Culture:   Recent Labs   Lab 12/29/23  0004   LABBLOO Gram stain aer bottle: Gram positive cocci in clusters resembling Staph  Positive results previously called 12/30/2023  Gram stain aer bottle: Gram positive cocci in clusters resembling Staph  Results called to and read back by: Mary Lopez RN 12/30/2023  09:26     BMP:   Recent Labs   Lab 12/30/23  0705 12/30/23  1455   * 345*   * 132*   K 4.8 3.9   CL 96 95   CO2 18* 24   BUN 28* 12   CREATININE 5.9* 3.6*   CALCIUM 8.6* 8.2*   MG 1.7  --      CBC:   Recent Labs   Lab 12/29/23  0545 12/29/23  0917 12/30/23  0502   WBC 5.78 5.13 5.01   HGB 7.7* 7.9* 7.8*   HCT 24.7* 25.1* 24.8*   * 110* 132*       Significant Imaging: I have reviewed all pertinent imaging results/findings within the past 24 hours.

## 2023-12-30 NOTE — SUBJECTIVE & OBJECTIVE
"Interval HPI:   No acute events overnight. Patient in room 322/322 A. Blood glucose variable. BG at, above, and below goal on current insulin regimen (SSI, prandial, and basal insulin ). Steroid use- None.   1 Day Post-Op  Renal function- Abnormal - 5.9 Cr    Vasopressors-  None     Diet Cardiac     Eatin%  Nausea: No  Hypoglycemia and intervention: Yes, BG 51 overnight, asymptomatic, recheck  (per nursing note). Received dextrose.  Fever: No  TPN and/or TF: No      BP (!) 148/70   Pulse 86   Temp 98.4 °F (36.9 °C) (Oral)   Resp 16   Ht 5' 7" (1.702 m)   Wt 78.5 kg (173 lb)   LMP  (LMP Unknown)   SpO2 97%   BMI 27.10 kg/m²     Labs Reviewed and Include    Recent Labs   Lab 23  0705   *   CALCIUM 8.6*   ALBUMIN 2.3*   PROT 6.7   *   K 4.8   CO2 18*   CL 96   BUN 28*   CREATININE 5.9*   ALKPHOS 64   ALT 8*   AST 15   BILITOT 0.4     Lab Results   Component Value Date    WBC 5.01 2023    HGB 7.8 (L) 2023    HCT 24.8 (L) 2023    MCV 94 2023     (L) 2023     No results for input(s): "TSH", "FREET4" in the last 168 hours.  Lab Results   Component Value Date    HGBA1C 8.0 (H) 2023       Nutritional status:   Body mass index is 27.1 kg/m².  Lab Results   Component Value Date    ALBUMIN 2.3 (L) 2023    ALBUMIN 2.2 (L) 2023    ALBUMIN 2.2 (L) 2023     No results found for: "PREALBUMIN"    Estimated Creatinine Clearance: 11.4 mL/min (A) (based on SCr of 5.9 mg/dL (H)).    Accu-Checks  Recent Labs     23  0740 23  1228 23  1256 23  1432 23  1647 23  2221 23  2314   POCTGLUCOSE 342* 167* 208* 171* 115* 51* 136* 159*       Current Medications and/or Treatments Impacting Glycemic Control  Immunotherapy:    Immunosuppressants       None          Steroids:   Hormones (From admission, onward)      Start     Stop Route Frequency Ordered    23 0020  melatonin tablet 3 mg     "     -- Oral Nightly PRN 12/28/23 2331          Pressors:    Autonomic Drugs (From admission, onward)      None          Hyperglycemia/Diabetes Medications:   Antihyperglycemics (From admission, onward)      Start     Stop Route Frequency Ordered    12/30/23 1130  insulin aspart U-100 pen 4 Units         -- SubQ 3 times daily with meals 12/30/23 0855    12/30/23 0900  insulin detemir U-100 (Levemir) pen 12 Units         -- SubQ 2 times daily 12/29/23 1200    12/30/23 0900  insulin aspart U-100 pen 4 Units         -- SubQ Once 12/30/23 0855    12/30/23 0855  insulin aspart U-100 pen 0-5 Units         -- SubQ Before meals & nightly PRN 12/30/23 0855

## 2023-12-30 NOTE — SUBJECTIVE & OBJECTIVE
"Interval History: "OK". No acute events overnight. JAVY confirms vegetation on lead. Blood cultures repeated at C positive for MRSA.    Review of Systems   Constitutional:  Negative for chills, fatigue, fever and unexpected weight change.   HENT:  Negative for ear pain, facial swelling, hearing loss, mouth sores, nosebleeds, rhinorrhea, sinus pressure, sore throat, tinnitus, trouble swallowing and voice change.    Eyes:  Negative for photophobia, pain, redness and visual disturbance.   Respiratory:  Negative for cough, chest tightness, shortness of breath and wheezing.    Cardiovascular:  Negative for chest pain, palpitations and leg swelling.   Gastrointestinal:  Negative for abdominal pain, blood in stool, constipation, diarrhea, nausea and vomiting.   Endocrine: Negative for cold intolerance, heat intolerance, polydipsia, polyphagia and polyuria.   Genitourinary:  Negative for decreased urine volume, dysuria, flank pain, frequency, hematuria, menstrual problem, urgency, vaginal bleeding, vaginal discharge and vaginal pain.   Musculoskeletal:  Negative for arthralgias, back pain, joint swelling, myalgias and neck pain.   Skin:  Negative for rash.   Allergic/Immunologic: Negative for environmental allergies, food allergies and immunocompromised state.   Neurological:  Negative for dizziness, seizures, syncope, weakness, light-headedness, numbness and headaches.   Hematological:  Negative for adenopathy. Does not bruise/bleed easily.   Psychiatric/Behavioral:  Negative for confusion, hallucinations, self-injury, sleep disturbance and suicidal ideas. The patient is not nervous/anxious.      Objective:     Vital Signs (Most Recent):  Temp: 98.6 °F (37 °C) (12/30/23 1309)  Pulse: 77 (12/30/23 1532)  Resp: 18 (12/30/23 1532)  BP: (!) 158/79 (12/30/23 1400)  SpO2: 96 % (12/30/23 1532) Vital Signs (24h Range):  Temp:  [97.5 °F (36.4 °C)-99 °F (37.2 °C)] 98.6 °F (37 °C)  Pulse:  [77-97] 77  Resp:  [15-20] 18  SpO2:  [93 " %-98 %] 96 %  BP: (140-184)/(65-87) 158/79     Weight: 78.5 kg (173 lb)  Body mass index is 27.1 kg/m².    Estimated Creatinine Clearance: 18.6 mL/min (A) (based on SCr of 3.6 mg/dL (H)).     Physical Exam  Vitals and nursing note reviewed.   Constitutional:       Appearance: She is well-developed.   HENT:      Head: Normocephalic and atraumatic.      Right Ear: External ear normal.      Left Ear: External ear normal.   Eyes:      General: No scleral icterus.        Right eye: No discharge.         Left eye: No discharge.      Conjunctiva/sclera: Conjunctivae normal.   Cardiovascular:      Rate and Rhythm: Normal rate and regular rhythm.      Heart sounds: Murmur heard.      No friction rub. No gallop.   Pulmonary:      Effort: Pulmonary effort is normal. No respiratory distress.      Breath sounds: Normal breath sounds. No stridor. No wheezing or rales.   Abdominal:      General: Bowel sounds are normal.   Musculoskeletal:         General: No tenderness. Normal range of motion.   Skin:     General: Skin is warm and dry.   Neurological:      Mental Status: She is alert and oriented to person, place, and time.          Significant Labs: Blood Culture:   Recent Labs   Lab 12/29/23  0004   LABBLOO Gram stain aer bottle: Gram positive cocci in clusters resembling Staph  Positive results previously called 12/30/2023  Gram stain aer bottle: Gram positive cocci in clusters resembling Staph  Results called to and read back by: Mary Lopez RN 12/30/2023  09:26     BMP:   Recent Labs   Lab 12/30/23  0705 12/30/23  1455   * 345*   * 132*   K 4.8 3.9   CL 96 95   CO2 18* 24   BUN 28* 12   CREATININE 5.9* 3.6*   CALCIUM 8.6* 8.2*   MG 1.7  --      CBC:   Recent Labs   Lab 12/29/23  0545 12/29/23  0917 12/30/23  0502   WBC 5.78 5.13 5.01   HGB 7.7* 7.9* 7.8*   HCT 24.7* 25.1* 24.8*   * 110* 132*       Significant Imaging: I have reviewed all pertinent imaging results/findings within the past 24 hours.

## 2023-12-30 NOTE — ASSESSMENT & PLAN NOTE
- MRSA on OSH Bcx since 12/18. On daptomycin 6mg/kg Q48hr and cefepime at OSH  - Trialysis line removed due to concern for source  - 12/27 JAVY concern for vegetation reported  - ID consulted- continue dapto 10mg/kg- continue cefepime w/h/o c perfringens   - repeat blood cultures today- +iv for MRSA 12/29- 01/04  - will discuss w/ID and pharm for antibiotic locks while awaiting device extraction- after device extraction, then will need new line   - EP consulted- JAVY performed w/RV lead vegetation- will need to undergo extraction and then possible re-implantation of leadless pacer after clearance of blood cultures

## 2023-12-30 NOTE — ASSESSMENT & PLAN NOTE
Patient's FSGs are controlled on current medication regimen.  Last A1c reviewed-   Lab Results   Component Value Date    HGBA1C 8.0 (H) 12/19/2023     Most recent fingerstick glucose reviewed-   Recent Labs   Lab 12/29/23  0740 12/29/23  1256 12/29/23  1432 12/29/23  1647   POCTGLUCOSE 342* 208* 171* 115*     Current correctional scale  Medium  Maintain anti-hyperglycemic dose as follows-   Antihyperglycemics (From admission, onward)      Start     Stop Route Frequency Ordered    12/30/23 0900  insulin detemir U-100 (Levemir) pen 12 Units         -- SubQ 2 times daily 12/29/23 1200    12/29/23 2100  insulin detemir U-100 (Levemir) pen 4 Units         -- SubQ Once 12/29/23 1200    12/29/23 1145  insulin aspart U-100 pen 7 Units         -- SubQ 3 times daily with meals 12/29/23 1133    12/29/23 0907  insulin aspart U-100 pen 0-10 Units         -- SubQ Before meals & nightly PRN 12/29/23 0807          Hold Oral hypoglycemics while patient is in the hospital.

## 2023-12-30 NOTE — ASSESSMENT & PLAN NOTE
I have reviewed hospital notes from   service and other specialty providers. I have also reviewed CBC, CMP/BMP,  cultures and imaging with my interpretation as documented.     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 Blood cultures on 12/8 with C perfringes and MRSA. Repeat blood cultures 12/18-27 with MRSA. Patient is afebrile and without leukocytosis. Repeat blood cultures collected in [facility] on 12/29 are NGTD. JAVY performed today and pending results.  Continue cefepime and daptomycin.  Daptomycin dose increased to 10 mg/kg.  Discontinue rifampin as this can prolong bacteremia. Will consider resuming once bacteremia has cleared.   Repeat blood cultures tomorrow morning.   Discussed management plan with the staff and/or members from  service.

## 2023-12-30 NOTE — ASSESSMENT & PLAN NOTE
- S/P PCI D2 with [facility] x 1 (07/13/2016)  - S/P PCI OM1 with [facility] x 1 (01/04/2017)  - S/P PCI prox D2 with [facility] x 1 (03/07/2017)  - Patent stents with [facility] by [facility] 12/10/2019  - S/P [facility] with RCA 90 % stenosis, [facility] x 3 (4/5/2022)    Most recent C during admission. Continue aspirin, heparin drip.

## 2023-12-30 NOTE — PROGRESS NOTES
[facility] - Cardiology Stepdown  Infectious Disease  Progress Note    Patient Name: Ellen Roman  MRN: [id number]  Admission Date: 12/28/2023  Length of Stay: 1 days  Attending Physician: Yamilet Boyce MD  Primary Care Provider: Paco Amanda MD    Isolation Status: No active isolations  Assessment/Plan:      ID  * MRSA bacteremia  Secondary to AICD infection.   Management as in AICD infection.  Pending EP management.     Infection associated with cardiac device  I have reviewed hospital notes from   service and other specialty providers. I have also reviewed CBC, CMP/BMP,  cultures and imaging with my interpretation as documented.     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 Blood cultures on 12/8 with C perfringes and MRSA. Repeat blood cultures 12/18-27 with MRSA. Patient is afebrile and without leukocytosis. Repeat blood cultures collected in [facility] on 12/29 are NGTD. JAVY performed today and pending results.  Continue cefepime and daptomycin.  Daptomycin dose increased to 10 mg/kg.  Discontinue rifampin as this can prolong bacteremia. Will consider resuming once bacteremia has cleared.   Repeat blood cultures tomorrow morning.   Discussed management plan with the staff and/or members from  service.          Anticipated Disposition: per primary    Thank you for your consult. I will follow-up with patient. Please contact us if you have any additional questions.    Karen Kurtz MD  Infectious Disease  [facility] - Cardiology Stepdown    75 minutes of total time spent on the encounter, which includes face to face time and non-face to face time preparing to see the patient (eg, review of tests), obtaining and/or reviewing separately obtained history, documenting clinical information in the electronic or other health record, independently interpreting results (not separately reported) and communicating results to the patient/family/caregiver, or care coordination  "(not separately reported).     Subjective:     Principal Problem:MRSA bacteremia    HPI: A 58-year-old woman with DM1, diabetic gastroparesis, diabetic retinopathy, diabetic nephropathy, ESRD on HD (MWF), CAD, HTN, s/p AICD, s/p mitral clip due to severe MR, chronic thrombocytopenia, and recent C tropicalis fungemia with positive HD catheter for K pneumoniae (October) who was transferred from [facility] for further management in the setting of refractory MRSA bacteremia and ACID lead vegetation.     Of note, Mrs. Roman was seen in [facility] ED on 12/18 at which time samples were collected for culture and she was subsequently discharged Blood cultures collected on 12/8 became positive for MRSA and C perfringens. She returned to [facility] on 12/18 at which time she was admitted for further management. Cultures collected from 12/18-12/27 are positive for MRSA. During her admission at [facility] she was diagnosed and treated for left IJ DVT, LUE cellulitis, DM1. Work up also revealed evidence of a large vegetation attached to the AICD lead across the TV.     Infectious Diseases consulted for "Bacteremia with concern for ICD lead infection. Transferred on daptomycin and cefepime. "    Past Medical History:   Diagnosis Date    Acute hepatitis A     Acute kidney injury 03/07/2018    Acute respiratory failure with hypoxemia 03/13/2020    Arthritis     Cataract     No date or laterality given    Chronic systolic CHF (congestive heart failure), NYHA class 3     Coronary artery disease     Diabetes mellitus     Dialysis patient     Dilated cardiomyopathy     Diverticulosis of intestine 10/02/2013    Gastroparesis 02/24/2014    Hypertension     ICD (implantable cardioverter-defibrillator), biventricular, in situ     Ischemic cardiomyopathy 04/29/2018    Left bundle branch block 04/29/2018    Microcytic hypochromic anemia 03/07/2018    Migraine 01/24/2016    Presence of drug coated stent in LAD coronary artery     A SHU stent was " placed to the mid D#2 in 7-2016, and then into the ostium of the D#2 in 1-2018, and then to the prox-mid D#2 in 3-2017    Presence of drug coated stent in left circumflex coronary artery 01/20/2017    A SHU stent was placed to the proximal OM 1 branch in January of 2017    Severe mitral regurgitation 04/12/2018       Past Surgical History:   Procedure Laterality Date    ABLATION OF ARRHYTHMOGENIC FOCUS FOR SUPRAVENTRICULAR TACHYCARDIA WITH ELECTROPHYSIOLOGY STUDY N/A 10/06/2021    Procedure: Ablation SVT;  Surgeon: Joby Gutiérrez MD;  Location: [facility] EP LAB;  Service: Cardiology;  Laterality: N/A;    ANGIOGRAM, CORONARY, WITH LEFT HEART CATHETERIZATION  04/05/2022    Procedure: Angiogram, Coronary, with Left Heart Cath;  Surgeon: Manav Schneider MD;  Location: [facility] CATH LAB;  Service: Cardiology;;    BREAST BIOPSY Left     BREAST SURGERY Left     left biopsy    CARDIAC CATHETERIZATION      CARDIAC DEFIBRILLATOR PLACEMENT      CARDIAC ELECTROPHYSIOLOGY STUDY WITH DRUG CHALLENGE  10/06/2021    Procedure: EP Drug challenge;  Surgeon: Joby Gutiérrez MD;  Location: [facility] EP LAB;  Service: Cardiology;;    CATHETERIZATION OF BOTH LEFT AND RIGHT HEART N/A 12/10/2019    Procedure: CATHETERIZATION, HEART, BOTH LEFT AND RIGHT;  Surgeon: LUIS ALFREDO Ernandez MD;  Location: [facility] CATH LAB;  Service: Cardiology;  Laterality: N/A;    TONSILLECTOMY      TRANSESOPHAGEAL ECHOCARDIOGRAPHY  07/11/2016    TRANSESOPHAGEAL ECHOCARDIOGRAPHY N/A 01/10/2022    Procedure: ECHOCARDIOGRAM, TRANSESOPHAGEAL;  Surgeon: Leslie Fishman MD;  Location: [facility] CATH LAB;  Service: Cardiology;  Laterality: N/A;    TRANSESOPHAGEAL ECHOCARDIOGRAPHY N/A 8/17/2022    Procedure: ECHOCARDIOGRAM, TRANSESOPHAGEAL;  Surgeon: Manav Schneider MD;  Location: [facility] CATH LAB;  Service: Cardiology;  Laterality: N/A;    TUBAL LIGATION      VASCULAR SURGERY         Review of patient's allergies indicates:  No Known Allergies    Medications:  Medications Prior to Admission    Medication Sig    alirocumab (PRALUENT PEN) 150 mg/mL PnIj Inject 1 mL (150 mg total) into the skin every 14 (fourteen) days.    atorvastatin (LIPITOR) 40 MG tablet Take 1 tablet by mouth nightly.    calcitRIOL (ROCALTROL) 0.25 MCG Cap Take 1 capsule by mouth once daily.    cloNIDine (CATAPRES) 0.3 MG tablet Take 1 tablet by mouth 2 (two) times daily.    clopidogreL (PLAVIX) 75 mg tablet Take 1 tablet by mouth once daily.    diphenhydrAMINE (BENADRYL) 25 mg capsule Take 1 capsule (25 mg total) by mouth nightly as needed for Itching.    doxazosin (CARDURA) 8 MG Tab Take 1 tablet by mouth nightly.    ENTRESTO  mg per tablet TAKE 1 TABLET(S) BY MOUTH TWO TIMES A DAY    famotidine (PEPCID) 20 MG tablet Take 1 tablet by mouth once daily.    furosemide (LASIX) 20 MG tablet Take 1 tablet by mouth once daily.    hydrALAZINE (APRESOLINE) 50 MG tablet Take 1 tablet by mouth 2 (two) times a day (morning and before bed).    metoprolol succinate (TOPROL-XL) 100 MG 24 hr tablet Take 1 tablet by mouth once daily.    metroNIDAZOLE (FLAGYL) 500 MG tablet Take 1 tablet in the morning and 1 tablet before bedtime for 7 days.    NIFEdipine (PROCARDIA-XL) 60 MG (OSM) 24 hr tablet Take 1 tablet by mouth once daily.    ondansetron (ZOFRAN-ODT) 4 MG TbDL Take 1 tablet by mouth.    ondansetron (ZOFRAN-ODT) 4 MG TbDL Take 1 tablet by mouth every 8 (eight) hours as needed for up to 7 days.    pantoprazole (PROTONIX) 40 MG tablet Take 1 tablet by mouth once daily.    prochlorperazine (COMPAZINE) 10 MG tablet Take 1 tablet by mouth every 6 (six) hours as needed for up to 7 days.    promethazine (PHENERGAN) 25 MG tablet Take 1 tablet by mouth 4 (four) times daily as needed for nausea for up to 7 days    thiamine (VITAMIN B-1) 100 MG tablet Take 1 tablet (100 mg total) by mouth once daily.    vancomycin (VANCOCIN) 125 MG capsule Take 1 capsule by mouth in the morning and 1capsule by mouth at noon and 1 capsule by mouth in the evening and 1  capsule by mouth before bedtime for 10 days.    amoxicillin-clavulanate 500-125mg (AUGMENTIN) 500-125 mg Tab Take 1 tablet by mouth every morning for 2 days    blood sugar diagnostic (TRUE METRIX GLUCOSE TEST STRIP MISC) True Metrix Glucose Test Strip   USE TO TEST BLOOD SUGAR 3 TIMES DAILY    blood sugar diagnostic Strp Use to test blood sugar 3 (three) times daily before meals.    blood-glucose meter Misc Use as directed    blood-glucose meter Misc Use to test blood sugar as directed    blood-glucose sensor (DEXCOM G7 SENSOR) Gini Apply one to skin as directed and change q 10 days.    bumetanide (BUMEX) 2 MG tablet Take 1 tablet by mouth once daily.    calcitRIOL (ROCALTROL) 0.25 MCG Cap Take 1 capsule by mouth once daily.    calcium acetate,phosphat bind, (PHOSLO) 667 mg tablet Take 1 tablet by mouth in the morning and 1 tablet at noon and 1 tablet in the evening with meals.    epoetin john (PROCRIT) 20,000 unit/mL injection Inject 1 ml under the skin every two weeks. Administer only if hemoglobin is <10.5.    insulin glargine 100 units/mL SubQ pen Inject 30 Units into the skin nightly.    insulin lispro 100 unit/mL injection Inject 5 Units into the skin 3 (three) times daily with meals. Discard vial 28 days after opening.    insulin lispro 100 unit/mL injection Inject 0-20 Units into the skin 4 (four) times daily before meals and nightly. If BS : 0 units, -200: 4 units, -250: 8 units; -300: 12 units, -350: 16 units, -400: 20 units, BS > 400: 20 units and call your doctor    insulin lispro protamin-lispro (HUMALOG MIX 50-50 KWIKPEN) 100 unit/mL (50-50) InPn Inject 10 Units into the skin 3 (three) times daily before meals.    metoclopramide HCl (REGLAN) 10 MG tablet Take 1 tablet by mouth 4 (four) times daily before meals and nightly.    nitroGLYCERIN (NITROSTAT) 0.4 MG SL tablet Place 1 tablet under the tongue every 5 (five) minutes as needed for chest pain.  Max of 3 tablets  in a 15 minute period.    venlafaxine (EFFEXOR-XR) 37.5 MG 24 hr capsule Take 1 capsule by mouth daily     Antibiotics (From admission, onward)      Start     Stop Route Frequency Ordered    12/31/23 0930  DAPTOmycin (CUBICIN) 785 mg in sodium chloride 0.9% SolP 50 mL IVPB         -- IV Every 48 hours (non-standard times) 12/29/23 0839    12/29/23 0200  ceFEPIme (MAXIPIME) 1 g in dextrose 5 % in water (D5W) 100 mL IVPB (MB+)         -- IV Every 24 hours (non-standard times) 12/28/23 2331          Antifungals (From admission, onward)      None          Antivirals (From admission, onward)      None             Immunization History   Administered Date(s) Administered    COVID-19, MRNA, LN-S, PF (MODERNA FULL 0.5 ML DOSE) 03/17/2021, 04/14/2021    Hepatitis B (recombinant) Adjuvanted, 2 dose 10/07/2022, 11/02/2022    Influenza - Quadrivalent - PF *Preferred* (6 months and older) 12/03/2015, 10/19/2016, 09/24/2018, 09/26/2019, 11/12/2020, 12/02/2021, 09/26/2022    Influenza - Trivalent (ADULT) 10/25/2007    Influenza - Trivalent - PF (ADULT) 10/04/2013, 11/25/2014    Pneumococcal Conjugate - 13 Valent 02/03/2020    Pneumococcal Polysaccharide - 23 Valent 01/27/2009    Tdap 07/06/2021       Family History       Problem Relation (Age of Onset)    Colon cancer Father    Heart disease Mother    Hypertension Mother, Brother, Brother    Kidney cancer Sister    Leukemia Sister          Social History     Socioeconomic History    Marital status:     Number of children: 5   Tobacco Use    Smoking status: Never    Smokeless tobacco: Never   Substance and Sexual Activity    Alcohol use: Not Currently    Drug use: Never    Sexual activity: Yes     Partners: Male   Social History Narrative    She lives with her boy friend at home.      Social Determinants of Health     Financial Resource Strain: Low Risk  (12/29/2023)    Overall Financial Resource Strain (CARDIA)     Difficulty of Paying Living Expenses: Not hard at all    Food Insecurity: No Food Insecurity (12/29/2023)    Hunger Vital Sign     Worried About Running Out of Food in the Last Year: Never true     Ran Out of Food in the Last Year: Never true   Transportation Needs: No Transportation Needs (12/29/2023)    PRAPARE - Transportation     Lack of Transportation (Medical): No     Lack of Transportation (Non-Medical): No   Physical Activity: Insufficiently Active (12/29/2023)    Exercise Vital Sign     Days of Exercise per Week: 3 days     Minutes of Exercise per Session: 20 min   Stress: No Stress Concern Present (12/29/2023)    [employer] [place] [employer] - Occupational Stress Questionnaire     Feeling of Stress : Not at all   Social Connections: Socially Isolated (12/29/2023)    Social Connection and Isolation Panel [NHANES]     Frequency of Communication with Friends and Family: More than three times a week     Frequency of Social Gatherings with Friends and Family: Once a week     Attends Buddhist Services: Never     Active Member of Clubs or Organizations: No     Attends Club or Organization Meetings: Never     Marital Status:    Housing Stability: Low Risk  (12/29/2023)    Housing Stability Vital Sign     Unable to Pay for Housing in the Last Year: No     Number of Places Lived in the Last Year: 1     Unstable Housing in the Last Year: No     Review of Systems   Constitutional:  Negative for chills, fatigue, fever and unexpected weight change.   HENT:  Negative for ear pain, facial swelling, hearing loss, mouth sores, nosebleeds, rhinorrhea, sinus pressure, sore throat, tinnitus, trouble swallowing and voice change.    Eyes:  Negative for photophobia, pain, redness and visual disturbance.   Respiratory:  Negative for cough, chest tightness, shortness of breath and wheezing.    Cardiovascular:  Negative for chest pain, palpitations and leg swelling.   Gastrointestinal:  Negative for abdominal pain, blood in stool, constipation, diarrhea, nausea and  vomiting.   Endocrine: Negative for cold intolerance, heat intolerance, polydipsia, polyphagia and polyuria.   Genitourinary:  Negative for decreased urine volume, dysuria, flank pain, frequency, hematuria, menstrual problem, urgency, vaginal bleeding, vaginal discharge and vaginal pain.   Musculoskeletal:  Negative for arthralgias, back pain, joint swelling, myalgias and neck pain.   Skin:  Negative for rash.   Allergic/Immunologic: Negative for environmental allergies, food allergies and immunocompromised state.   Neurological:  Negative for dizziness, seizures, syncope, weakness, light-headedness, numbness and headaches.   Hematological:  Negative for adenopathy. Does not bruise/bleed easily.   Psychiatric/Behavioral:  Negative for confusion, hallucinations, self-injury, sleep disturbance and suicidal ideas. The patient is not nervous/anxious.      Objective:     Vital Signs (Most Recent):  Temp: 97.8 °F (36.6 °C) (12/29/23 1431)  Pulse: 90 (12/29/23 1800)  Resp: 20 (12/29/23 1605)  BP: (!) 142/68 (12/29/23 1548)  SpO2: 98 % (12/29/23 1605) Vital Signs (24h Range):  Temp:  [97.8 °F (36.6 °C)-98.9 °F (37.2 °C)] 97.8 °F (36.6 °C)  Pulse:  [72-93] 90  Resp:  [14-20] 20  SpO2:  [91 %-100 %] 98 %  BP: (104-184)/(58-83) 142/68     Weight: 78.5 kg (173 lb)  Body mass index is 27.1 kg/m².    Estimated Creatinine Clearance: 16.3 mL/min (A) (based on SCr of 4.1 mg/dL (H)).     Physical Exam  Vitals and nursing note reviewed.   Constitutional:       Appearance: She is well-developed.   HENT:      Head: Normocephalic and atraumatic.      Right Ear: External ear normal.      Left Ear: External ear normal.   Eyes:      General: No scleral icterus.        Right eye: No discharge.         Left eye: No discharge.      Conjunctiva/sclera: Conjunctivae normal.   Cardiovascular:      Rate and Rhythm: Normal rate and regular rhythm.      Heart sounds: Murmur heard.      No friction rub. No gallop.   Pulmonary:      Effort: Pulmonary  effort is normal. No respiratory distress.      Breath sounds: Normal breath sounds. No stridor. No wheezing or rales.   Abdominal:      General: Bowel sounds are normal.   Musculoskeletal:         General: No tenderness. Normal range of motion.   Skin:     General: Skin is warm and dry.   Neurological:      Mental Status: She is alert and oriented to person, place, and time.          Significant Labs: Blood Culture:   Recent Labs   Lab 12/29/23 0004   LABBLOO No Growth to date  No Growth to date     BMP:   Recent Labs   Lab 12/29/23 0004   *  309*   *  135*   K 4.1  4.1   CL 97  97   CO2 26  26   BUN 21*  21*   CREATININE 4.1*  4.1*   CALCIUM 7.6*  7.6*     CBC:   Recent Labs   Lab 12/29/23 0004 12/29/23  0545 12/29/23  0917   WBC 5.08  4.86 5.78 5.13   HGB 7.4*  7.6* 7.7* 7.9*   HCT 23.2*  23.6* 24.7* 25.1*   *  105* 109* 110*     Microbiology Results (last 7 days)       Procedure Component Value Units Date/Time    Blood culture [[id number]] Collected: 12/29/23 0004    Order Status: Completed Specimen: Blood Updated: 12/29/23 0715     Blood Culture, Routine No Growth to date    Blood culture [[id number]] Collected: 12/29/23 0004    Order Status: Completed Specimen: Blood Updated: 12/29/23 0715     Blood Culture, Routine No Growth to date            Significant Imaging: I have reviewed all pertinent imaging results/findings within the past 24 hours.

## 2023-12-30 NOTE — ASSESSMENT & PLAN NOTE
I have reviewed hospital notes from   service and other specialty providers. I have also reviewed CBC, CMP/BMP,  cultures and imaging with my interpretation as documented.     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 Blood cultures on 12/8 with C perfringes and MRSA. Repeat blood cultures 12/18-27 with MRSA. Patient is afebrile and without leukocytosis. Repeat blood cultures collected in [facility] on 12/29 are NGTD. JAVY with vegetation per discussion with teams.   Continue cefepime as prior blood cultures showed C perfringes and prior HD catheter had grown Klebsiella.   Continue daptomycin.  CK level in AM.  Antibiotic lock therapy with vancomycin, if able, for femoral HD catheter.  If unable to clear bacteremia post device extraction then may need to consider femoral catheter removal or exchange.  Repeat blood cultures tomorrow morning.   Of note, patient has voiced not wanting to return to Rehab for antibiotics. Will see if patient is able to receive vancomycin with HD at discharge.  Discussed management plan with the staff and/or members from  service.

## 2023-12-30 NOTE — PROCEDURES
[facility] HEMODIALYSIS NOTE     Patient currently on hemodialysis for removal of uremic toxins and volume.     Patient seen and evaluated on hemodialysis, tolerating treatment, see HD flowsheet for vitals and assessments.      Ultrafiltration goal is 3L     Labs have been reviewed and the dialysate bath has been adjusted.     Assessment/Plan:  Seen on HD, tolerating well.  ECHO yesterday with CVP 15.  3L above EDW, net UF adjusted for this.  Pre/post weights with HD today  EPO  Phos low normal, continue trending  Low K/Low phos diet    JESSIE Tyler, FNP-BC  Nephrology  Pager:  [phone]

## 2023-12-30 NOTE — ASSESSMENT & PLAN NOTE
#CRT-D in place  #Infection associated with cardiac device  58yoF with T1DM, ESRD on HD, Cad s/p PCI, iCM with prior EF 25-30% recovered to 55-60% with CRT-D in place (dual-coiled 2018 [employer] CRT-D), severe MR s/p MitraClip, and chronic thrombocytopenia is here as a transfer with bacteremia, vegetation involving her RV lead per outside JAVY. Unable to get good visualization with current imaging. Discussed with echo staff regarding need for further imaging lead extraction.     TTE with 0.7cmx1.8cm vegetation without evidence of TV involvement  JAVY pending read  Bcx MRSA + still  HD per groin trialysis  Formal interrogation showed she is not device-dependent    Recommendations:  - Plan for lead extraction early this week

## 2023-12-30 NOTE — ASSESSMENT & PLAN NOTE
Patient's FSGs are controlled on current medication regimen.  Last A1c reviewed-   Lab Results   Component Value Date    HGBA1C 8.0 (H) 12/19/2023     Most recent fingerstick glucose reviewed-   Recent Labs   Lab 12/29/23 2008 12/29/23  2221 12/29/23  2314 12/30/23  1222   POCTGLUCOSE 51* 136* 159* 205*       Current correctional scale  Medium  Maintain anti-hyperglycemic dose as follows-   Antihyperglycemics (From admission, onward)    Start     Stop Route Frequency Ordered    12/30/23 1130  insulin aspart U-100 pen 4 Units         -- SubQ 3 times daily with meals 12/30/23 0855    12/30/23 0900  insulin detemir U-100 (Levemir) pen 12 Units         -- SubQ 2 times daily 12/29/23 1200    12/30/23 0900  insulin aspart U-100 pen 4 Units         -- SubQ Once 12/30/23 0855    12/30/23 0855  insulin aspart U-100 pen 0-5 Units         -- SubQ Before meals & nightly PRN 12/30/23 0855        Hold Oral hypoglycemics while patient is in the hospital.

## 2023-12-30 NOTE — ASSESSMENT & PLAN NOTE
- MRSA on OSH Bcx since 12/18. On daptomycin 6mg/kg Q48hr and cefepime at OSH  - Trialysis line removed due to concern for source  - 12/27 JAVY concern for vegetation reported  - ID consulted- increased dapto to 10mg/kg and rifampin stopped- continued cefepime  - EP consulted- JAVY today

## 2023-12-30 NOTE — ASSESSMENT & PLAN NOTE
Patient's anemia is currently uncontrolled. Has not received any PRBCs to date. Etiology likely d/t chronic disease due to Chronic Kidney Disease/ESRD  Current CBC reviewed-   Lab Results   Component Value Date    HGB 7.8 (L) 12/30/2023    HCT 24.8 (L) 12/30/2023     Monitor serial CBC and transfuse if patient becomes hemodynamically unstable, symptomatic or H/H drops below 7/21.  - continue epo

## 2023-12-30 NOTE — SUBJECTIVE & OBJECTIVE
Interval History: No complaints this am. No sob, chest pain, n/v, fevers, chills, night sweats, abd pain, diarrhea, constipation. Underwent HD this am.    Objective:     Vital Signs (Most Recent):  Temp: 98.6 °F (37 °C) (12/30/23 1309)  Pulse: 88 (12/30/23 1309)  Resp: 20 (12/30/23 1309)  BP: (!) 158/79 (12/30/23 1309)  SpO2: 98 % (12/30/23 1309) Vital Signs (24h Range):  Temp:  [97.5 °F (36.4 °C)-99 °F (37.2 °C)] 98.6 °F (37 °C)  Pulse:  [80-97] 88  Resp:  [15-20] 20  SpO2:  [93 %-98 %] 98 %  BP: (129-184)/(64-87) 158/79     Weight: 78.5 kg (173 lb)  Body mass index is 27.1 kg/m².    Intake/Output Summary (Last 24 hours) at 12/30/2023 1508  Last data filed at 12/30/2023 1130  Gross per 24 hour   Intake 650 ml   Output 3650 ml   Net -3000 ml         Physical Exam  Gen: in NAD, appears stated age  Neuro: AAOx3, motor, sensory, and strength grossly intact BL  HEENT: NTNC, EOMI, PERRL, MMM, nodular presence of the Rt IJ area  CVS: RRR, no m/r/g; S1/S2 auscultated with no S3 or S4; capillary refill < 2 sec  Resp: lungs CTAB, no w/r/r; no belabored breathing or accessory muscle use appreciated   Abd: BS+ in all 4 quadrants; NTND, soft to palpation; no organomegaly appreciated   Extrem: pulses full, equal, and regular over all 4 extremities; no UE edema, BL LE edema, Rt fem CVL    Significant Labs: All pertinent labs within the past 24 hours have been reviewed.  Blood Culture:   Recent Labs   Lab 12/29/23  0004   LABBLOO Gram stain aer bottle: Gram positive cocci in clusters resembling Staph  Results called to and read back by: Mary Lopez RN 12/30/2023  09:26  No Growth to date  No Growth to date     CBC:   Recent Labs   Lab 12/29/23  0545 12/29/23  0917 12/30/23  0502   WBC 5.78 5.13 5.01   HGB 7.7* 7.9* 7.8*   HCT 24.7* 25.1* 24.8*   * 110* 132*     CMP:   Recent Labs   Lab 12/29/23  0004 12/30/23  0705   *  135* 129*   K 4.1  4.1 4.8   CL 97  97 96   CO2 26  26 18*   *  309* 233*   BUN  "21*  21* 28*   CREATININE 4.1*  4.1* 5.9*   CALCIUM 7.6*  7.6* 8.6*   PROT 5.9* 6.7   ALBUMIN 2.2*  2.2* 2.3*   BILITOT 0.4 0.4   ALKPHOS 60 64   AST 8* 15   ALT 8* 8*   ANIONGAP 12  12 15     Respiratory Culture: No results for input(s): "GSRESP", "RESPIRATORYC" in the last 48 hours.  Troponin: No results for input(s): "TROPONINI", "TROPONINIHS" in the last 48 hours.    Significant Imaging: I have reviewed all pertinent imaging results/findings within the past 24 hours.    JAVY:     Left Ventricle: The left ventricle is normal in size. Increased ventricular mass. Increased wall thickness. There is concentric hypertrophy. Normal wall motion. There is normal systolic function with a visually estimated ejection fraction of 60 - 65%. Grade I diastolic dysfunction.    Right Ventricle: Normal right ventricular cavity size. Systolic function is normal. Pacemaker lead present in the ventricle with a 0.7 x 1.8 cm mobile, echogenic structure consistent with a vegetation.    Left Atrium: Left atrium is moderately dilated.    Right Atrium: Right atrium is mildly dilated.    Mitral Valve: The mitral valve is repaired by MitraClip. There is mild regurgitation.    Tricuspid Valve: The tricuspid valve is structurally normal. There is moderate regurgitation. The vegetation on the ventricular lead appears adjacent to but not adherent to the septal leaflet.    Pulmonary Artery: There is pulmonary hypertension. The estimated pulmonary artery systolic pressure is 76 mmHg.    IVC/SVC: Elevated venous pressure at 15 mmHg.    Pericardium: There is a trivial posterior effusion. No indication of cardiac tamponade.  "

## 2023-12-30 NOTE — ASSESSMENT & PLAN NOTE
Creatine stable for now. BMP reviewed- noted Estimated Creatinine Clearance: 11.4 mL/min (A) (based on SCr of 5.9 mg/dL (H)). according to latest data. Based on current GFR, CKD stage is end stage.  Monitor UOP and serial BMP and adjust therapy as needed. Renally dose meds. Avoid nephrotoxic medications and procedures.    - continue calcium acetate

## 2023-12-30 NOTE — PROGRESS NOTES
[facility] - Cardiology [facility] Medicine  Progress Note    Patient Name: Ellen Roman  MRN: [id number]  Patient Class: IP- Inpatient   Admission Date: 12/28/2023  Length of Stay: 1 days  Attending Physician: Yamilet Boyce MD  Primary Care Provider: Paco Amanda MD        Subjective:     Principal Problem:MRSA bacteremia        HPI:  Ellen Roman is 58 year old female with PMHx significant for T1DM c/b gastroparesis, retinopathy and nephropathy - ESRD on HD, HTN, HLD, CAD s/p prior PCI with ischemic cardiomyopathy s/p AICD placement, chronic thrombocytopenia, severe mitral regurgitation status post MitraClip, history of fungemia/bacteremia initially presenting to [facility] ER on 12/18 with a complaints of right-sided chest pain associated with nausea and vomiting multiple episodes for the prior 2 days.     She also reported low-grade temperature. Patient reports generalized weakness. Cardiology performed LHC on 12/20/2023 for evaluation of this chest pain with recommendation for medical management for coronary artery disease. Course is then complicated by refractory MRSA bacteremia. HM team initially suspected source of bacteremia was a tunneled dialysis catheter. This was removed upon admission and patient had placement of left IJ. The placement of left IJ trialysis catheter was c/b IJ thrombosis for which she is started on provoked DVT management - currently with lovenox though with ESRD would consider transition to heparin. IR had difficulty placing the right IJ due to thrombosis as well and access subsequently was placed on the right femoral. Due to persistent bacteremia despite vancomycin, patient subsequently had JAVY 12/27/23 with findings reported as positive for vegetations present on the ICD lead wire. As per OS cardiologist, [place] [facility] [place] [facility] [place] [employer].     Current medications:  aspirin 81 mg Oral Daily   atorvastatin 40 mg Oral Nightly   cefepime 1 g  Intravenous Q24H   DAPTOmycin 6 mg/kg Intravenous Q48H since 12/26  enoxaparin 1 mg/kg (Ideal) Subcutaneous Nightly   epoetin john 100 Units/kg Subcutaneous Once per day on Tuesday Thursday Saturday   hydrALAZINE 25 mg Oral TID   insulin glargine 10 Units Subcutaneous Nightly   insulin lispro 0-10 Units Subcutaneous AC & HS   ipratropium-albuteroL 3 mL Nebulization Q6H   metoprolol succinate XL 50 mg Oral Daily   pantoprazole 40 mg Oral QAM   pregabalin 50 mg Oral Daily since 12/27  rifAMPin 300 mg Oral Daily   sacubitriL-valsartan 1 tablet Oral BID   vancomycin 15 mg/kg Intravenous since 12/18    Overview/Hospital Course:  ID, Nephro, EP consulted on admission. JAVY performed 12/29. TTE 12/29:       Left Ventricle: The left ventricle is normal in size. Increased ventricular mass. Increased wall thickness. There is concentric hypertrophy. Normal wall motion. There is normal systolic function with a visually estimated ejection fraction of 60 - 65%. Grade I diastolic dysfunction.    Right Ventricle: Normal right ventricular cavity size. Systolic function is normal. Pacemaker lead present in the ventricle with a 0.7 x 1.8 cm mobile, echogenic structure consistent with a vegetation.    Left Atrium: Left atrium is moderately dilated.    Right Atrium: Right atrium is mildly dilated.    Mitral Valve: The mitral valve is repaired by MitraClip. There is mild regurgitation.    Tricuspid Valve: The tricuspid valve is structurally normal. There is moderate regurgitation. The vegetation on the ventricular lead appears adjacent to but not adherent to the septal leaflet.    Pulmonary Artery: There is pulmonary hypertension. The estimated pulmonary artery systolic pressure is 76 mmHg.    IVC/SVC: Elevated venous pressure at 15 mmHg.    Pericardium: There is a trivial posterior effusion. No indication of cardiac tamponade.    ID changed antibiotics to cefepime and dapto. Endocrine consulted for diabetes management. Nephro consulted  for HD needs. Lasix increased to 80mg qday. Patient was continued on heparin gtt for thrombosed IJ.     Interval History: No complaints this am. No sob, chest pain, n/v, fevers, chills, night sweats, abd pain, diarrhea, constipation.     Objective:     Vital Signs (Most Recent):  Temp: 97.8 °F (36.6 °C) (12/29/23 1431)  Pulse: 90 (12/29/23 1800)  Resp: 20 (12/29/23 1605)  BP: (!) 142/68 (12/29/23 1548)  SpO2: 98 % (12/29/23 1605) Vital Signs (24h Range):  Temp:  [97.8 °F (36.6 °C)-98.9 °F (37.2 °C)] 97.8 °F (36.6 °C)  Pulse:  [72-93] 90  Resp:  [14-20] 20  SpO2:  [91 %-100 %] 98 %  BP: (104-184)/(58-83) 142/68     Weight: 78.5 kg (173 lb)  Body mass index is 27.1 kg/m².  No intake or output data in the 24 hours ending 12/29/23 1957      Physical Exam  Gen: in NAD, appears stated age  Neuro: AAOx3, motor, sensory, and strength grossly intact BL  HEENT: NTNC, EOMI, PERRL, MMM, nodular presence of the Rt IJ area  CVS: RRR, no m/r/g; S1/S2 auscultated with no S3 or S4; capillary refill < 2 sec  Resp: lungs CTAB, no w/r/r; no belabored breathing or accessory muscle use appreciated   Abd: BS+ in all 4 quadrants; NTND, soft to palpation; no organomegaly appreciated   Extrem: pulses full, equal, and regular over all 4 extremities; no UE edema, BL LE edema, Rt fem CVL    Significant Labs: All pertinent labs within the past 24 hours have been reviewed.  Blood Culture:   Recent Labs   Lab 12/29/23  0004   LABBLOO No Growth to date  No Growth to date     CBC:   Recent Labs   Lab 12/29/23  0004 12/29/23  0545 12/29/23  0917   WBC 5.08  4.86 5.78 5.13   HGB 7.4*  7.6* 7.7* 7.9*   HCT 23.2*  23.6* 24.7* 25.1*   *  105* 109* 110*     CMP:   Recent Labs   Lab 12/29/23  0004   *  135*   K 4.1  4.1   CL 97  97   CO2 26  26   *  309*   BUN 21*  21*   CREATININE 4.1*  4.1*   CALCIUM 7.6*  7.6*   PROT 5.9*   ALBUMIN 2.2*  2.2*   BILITOT 0.4   ALKPHOS 60   AST 8*   ALT 8*   ANIONGAP 12  12  "    Respiratory Culture: No results for input(s): "GSRESP", "RESPIRATORYC" in the last 48 hours.  Troponin: No results for input(s): "TROPONINI", "TROPONINIHS" in the last 48 hours.    Significant Imaging: I have reviewed all pertinent imaging results/findings within the past 24 hours.    Assessment/Plan:      * MRSA bacteremia  - MRSA on OSH Bcx since 12/18. 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  Last A1c reviewed-   Lab Results   Component Value Date    HGBA1C 8.0 (H) 12/19/2023     Most recent fingerstick glucose reviewed-   Recent Labs   Lab 12/29/23  0740 12/29/23  1256 12/29/23  1432 12/29/23  1647   POCTGLUCOSE 342* 208* 171* 115*     Current correctional scale  Medium  Maintain anti-hyperglycemic dose as follows-   Antihyperglycemics (From admission, onward)      Start     Stop Route Frequency Ordered    12/30/23 0900  insulin detemir U-100 (Levemir) pen 12 Units         -- SubQ 2 times daily 12/29/23 1200    12/29/23 2100  insulin detemir U-100 (Levemir) pen 4 Units         -- SubQ Once 12/29/23 1200    12/29/23 1145  insulin aspart U-100 pen 7 Units         -- SubQ 3 times daily with meals 12/29/23 1133    12/29/23 0907  insulin aspart U-100 pen 0-10 Units         -- SubQ Before meals & nightly PRN 12/29/23 0807          Hold Oral hypoglycemics while patient is in the hospital.    Infection associated with cardiac device  - see MRSA bacteremia       Chronic kidney disease-mineral and bone disorder  Creatine stable for now. BMP reviewed- noted Estimated Creatinine Clearance: 16.3 mL/min (A) (based on SCr of 4.1 mg/dL (H)). according to latest data. Based on current " GFR, CKD stage is end stage.  Monitor UOP and serial BMP and adjust therapy as needed. Renally dose meds. Avoid nephrotoxic medications and procedures.    - continue calcium acetate    Anemia due to stage 5 chronic kidney disease, not on chronic dialysis  Patient's anemia is currently uncontrolled. Has not received any PRBCs to date. Etiology likely d/t chronic disease due to Chronic Kidney Disease/ESRD  Current CBC reviewed-   Lab Results   Component Value Date    HGB 7.9 (L) 12/29/2023    HCT 25.1 (L) 12/29/2023     Monitor serial CBC and transfuse if patient becomes hemodynamically unstable, symptomatic or H/H drops below 7/21.  - continue epo    ESRD (end stage renal disease)  - ESRD with trialysis line removed.   - Nephrology consulted- Rt fem CVL- will discuss with nephro and ID about more permanent HD needs- will need clearance of blood cultures- and if ICD needs to be removed     Cardiac resynchronization therapy defibrillator (CRT-D) in place  - [employer] CRT-D implanted 2018      Nonrheumatic mitral valve regurgitation  - S/p mitraclip       Coronary artery disease involving native coronary artery of native heart without angina pectoris  - S/P PCI D2 with SHU x 1 (07/13/2016)  - S/P PCI OM1 with SHU x 1 (01/04/2017)  - S/P PCI prox D2 with SHU x 1 (03/07/2017)  - Patent stents with NCCAD by [facility] 12/10/2019  - S/P [facility] with RCA 90 % stenosis, SHU x 3 (4/5/2022)    Most recent LHC during admission. Continue aspirin, heparin drip.      VTE Risk Mitigation (From admission, onward)           Ordered     heparin 25,000 units in dextrose 5% (100 units/ml) IV bolus from bag - ADDITIONAL PRN BOLUS - 60 units/kg  As needed (PRN)        Question:  Heparin Infusion Adjustment (DO NOT MODIFY ANSWER)  Answer:  [email or web address]\Images\Pharmacy\HeparinInfusions\heparin HIGH INTENSITY nomogram for [facility] [id number]    12/29/23 0901     heparin 25,000 units in dextrose 5% (100 units/ml) IV bolus from bag - ADDITIONAL  PRN BOLUS - 30 units/kg  As needed (PRN)        Question:  Heparin Infusion Adjustment (DO NOT MODIFY ANSWER)  Answer:  [email or web address]\Images\Pharmacy\HeparinInfusions\heparin HIGH INTENSITY nomogram for [facility] [id number]    12/29/23 0901     heparin 25,000 units in dextrose 5% 250 mL (100 units/mL) infusion HIGH INTENSITY nomogram - [facility]  Continuous        Question:  Begin at (units/kg/hr)  Answer:  18    12/29/23 0901     heparin (porcine) injection 3,000 Units  Daily PRN         12/28/23 3621                    Discharge Planning   VIRA: 1/2/2024     Code Status: Full Code   Is the patient medically ready for discharge?: No    Reason for patient still in hospital (select all that apply): Patient trending condition  Discharge Plan A: Home with family, Home Health                    Yamilet Boyce MD  Department of Hospital Medicine   [facility] - Cardiology Stepdown

## 2023-12-30 NOTE — SUBJECTIVE & OBJECTIVE
"Interval History: No complaints this am. No sob, chest pain, n/v, fevers, chills, night sweats, abd pain, diarrhea, constipation.     Objective:     Vital Signs (Most Recent):  Temp: 97.8 °F (36.6 °C) (12/29/23 1431)  Pulse: 90 (12/29/23 1800)  Resp: 20 (12/29/23 1605)  BP: (!) 142/68 (12/29/23 1548)  SpO2: 98 % (12/29/23 1605) Vital Signs (24h Range):  Temp:  [97.8 °F (36.6 °C)-98.9 °F (37.2 °C)] 97.8 °F (36.6 °C)  Pulse:  [72-93] 90  Resp:  [14-20] 20  SpO2:  [91 %-100 %] 98 %  BP: (104-184)/(58-83) 142/68     Weight: 78.5 kg (173 lb)  Body mass index is 27.1 kg/m².  No intake or output data in the 24 hours ending 12/29/23 1957      Physical Exam  Gen: in NAD, appears stated age  Neuro: AAOx3, motor, sensory, and strength grossly intact BL  HEENT: NTNC, EOMI, PERRL, MMM, nodular presence of the Rt IJ area  CVS: RRR, no m/r/g; S1/S2 auscultated with no S3 or S4; capillary refill < 2 sec  Resp: lungs CTAB, no w/r/r; no belabored breathing or accessory muscle use appreciated   Abd: BS+ in all 4 quadrants; NTND, soft to palpation; no organomegaly appreciated   Extrem: pulses full, equal, and regular over all 4 extremities; no UE edema, BL LE edema, Rt fem CVL    Significant Labs: All pertinent labs within the past 24 hours have been reviewed.  Blood Culture:   Recent Labs   Lab 12/29/23  0004   LABBLOO No Growth to date  No Growth to date     CBC:   Recent Labs   Lab 12/29/23  0004 12/29/23  0545 12/29/23  0917   WBC 5.08  4.86 5.78 5.13   HGB 7.4*  7.6* 7.7* 7.9*   HCT 23.2*  23.6* 24.7* 25.1*   *  105* 109* 110*     CMP:   Recent Labs   Lab 12/29/23  0004   *  135*   K 4.1  4.1   CL 97  97   CO2 26  26   *  309*   BUN 21*  21*   CREATININE 4.1*  4.1*   CALCIUM 7.6*  7.6*   PROT 5.9*   ALBUMIN 2.2*  2.2*   BILITOT 0.4   ALKPHOS 60   AST 8*   ALT 8*   ANIONGAP 12  12     Respiratory Culture: No results for input(s): "GSRESP", "RESPIRATORYC" in the last 48 hours.  Troponin: No results " "for input(s): "TROPONINI", "TROPONINIHS" in the last 48 hours.    Significant Imaging: I have reviewed all pertinent imaging results/findings within the past 24 hours.  "

## 2023-12-30 NOTE — ASSESSMENT & PLAN NOTE
- ESRD with trialysis line removed.   - Nephrology consulted- Rt fem CVL- will discuss with nephro and ID about more permanent HD needs- will need clearance of blood cultures- and if ICD needs to be removed

## 2023-12-30 NOTE — ASSESSMENT & PLAN NOTE
- ESRD with trialysis line removed.   - Nephrology consulted- Rt fem CVL- will discuss with nephro and ID about more permanent HD needs- will need clearance of blood cultures and ICD removal

## 2023-12-30 NOTE — CONSULTS
Consultation performed and mislabeled as progress note. See progress note for Consult recommendations.   Karen Kurtz MD, [facility]  Staff Infectious Diseases Department

## 2023-12-30 NOTE — HOSPITAL COURSE
Transferred from OSH with diagnoses of MRSA bacteremia, cardiac device infection, bilateral thrombosis of IJ. ID, Nephro, EP consulted on admission. JAVY performed 12/29. TTE 12/29:        Left Ventricle: The left ventricle is normal in size. Increased ventricular mass. Increased wall thickness. There is concentric hypertrophy. Normal wall motion. There is normal systolic function with a visually estimated ejection fraction of 60 - 65%. Grade I diastolic dysfunction.    Right Ventricle: Normal right ventricular cavity size. Systolic function is normal. Pacemaker lead present in the ventricle with a 0.7 x 1.8 cm mobile, echogenic structure consistent with a vegetation.    Left Atrium: Left atrium is moderately dilated.    Right Atrium: Right atrium is mildly dilated.    Mitral Valve: The mitral valve is repaired by MitraClip. There is mild regurgitation.    Tricuspid Valve: The tricuspid valve is structurally normal. There is moderate regurgitation. The vegetation on the ventricular lead appears adjacent to but not adherent to the septal leaflet.    Pulmonary Artery: There is pulmonary hypertension. The estimated pulmonary artery systolic pressure is 76 mmHg.    IVC/SVC: Elevated venous pressure at 15 mmHg.    Pericardium: There is a trivial posterior effusion. No indication of cardiac tamponade.     ID changed antibiotics to cefepime and dapto. Endocrine consulted for diabetes management. Nephro consulted for HD needs. Lasix increased to 80mg qday. Patient was continued on heparin gtt for thrombosed IJ's. JAVY w/ RV pacer lead w/ 0.7 x 1.8 cm mobile, echogenic structure consistent with a vegetation. Did a vanc lock for the Rt fem CVL 12/30. Had issues with hyperglycemia - insulin regimen modified. Patient with several bouts of vomiting PM of 01/02 and am of 01/03- KUB w/large stool burden. Up-titrated bowel regimen. Had a bowel movement.     On 1/4, patient underwent device extraction for cardiac device infection.  During procedure, had cardiac arrest; team unable to achieve ROSC and death was pronounced.

## 2023-12-30 NOTE — SUBJECTIVE & OBJECTIVE
Past Medical History:   Diagnosis Date    Acute hepatitis A     Acute kidney injury 03/07/2018    Acute respiratory failure with hypoxemia 03/13/2020    Arthritis     Cataract     No date or laterality given    Chronic systolic CHF (congestive heart failure), NYHA class 3     Coronary artery disease     Diabetes mellitus     Dialysis patient     Dilated cardiomyopathy     Diverticulosis of intestine 10/02/2013    Gastroparesis 02/24/2014    Hypertension     ICD (implantable cardioverter-defibrillator), biventricular, in situ     Ischemic cardiomyopathy 04/29/2018    Left bundle branch block 04/29/2018    Microcytic hypochromic anemia 03/07/2018    Migraine 01/24/2016    Presence of drug coated stent in LAD coronary artery     A SHU stent was placed to the mid D#2 in 7-2016, and then into the ostium of the D#2 in 1-2018, and then to the prox-mid D#2 in 3-2017    Presence of drug coated stent in left circumflex coronary artery 01/20/2017    A SHU stent was placed to the proximal OM 1 branch in January of 2017    Severe mitral regurgitation 04/12/2018       Past Surgical History:   Procedure Laterality Date    ABLATION OF ARRHYTHMOGENIC FOCUS FOR SUPRAVENTRICULAR TACHYCARDIA WITH ELECTROPHYSIOLOGY STUDY N/A 10/06/2021    Procedure: Ablation SVT;  Surgeon: Joby Gutiérrez MD;  Location: [facility] EP LAB;  Service: Cardiology;  Laterality: N/A;    ANGIOGRAM, CORONARY, WITH LEFT HEART CATHETERIZATION  04/05/2022    Procedure: Angiogram, Coronary, with Left Heart Cath;  Surgeon: Manav Schneider MD;  Location: [facility] CATH LAB;  Service: Cardiology;;    BREAST BIOPSY Left     BREAST SURGERY Left     left biopsy    CARDIAC CATHETERIZATION      CARDIAC DEFIBRILLATOR PLACEMENT      CARDIAC ELECTROPHYSIOLOGY STUDY WITH DRUG CHALLENGE  10/06/2021    Procedure: EP Drug challenge;  Surgeon: Joby Gutiérrez MD;  Location: [facility] EP LAB;  Service: Cardiology;;    CATHETERIZATION OF BOTH LEFT AND RIGHT HEART N/A 12/10/2019    Procedure:  CATHETERIZATION, HEART, BOTH LEFT AND RIGHT;  Surgeon: LUIS ALFREDO Ernandez MD;  Location: [facility] CATH LAB;  Service: Cardiology;  Laterality: N/A;    TONSILLECTOMY      TRANSESOPHAGEAL ECHOCARDIOGRAPHY  07/11/2016    TRANSESOPHAGEAL ECHOCARDIOGRAPHY N/A 01/10/2022    Procedure: ECHOCARDIOGRAM, TRANSESOPHAGEAL;  Surgeon: Leslie Fishman MD;  Location: [facility] CATH LAB;  Service: Cardiology;  Laterality: N/A;    TRANSESOPHAGEAL ECHOCARDIOGRAPHY N/A 8/17/2022    Procedure: ECHOCARDIOGRAM, TRANSESOPHAGEAL;  Surgeon: Manav Schneider MD;  Location: [facility] CATH LAB;  Service: Cardiology;  Laterality: N/A;    TUBAL LIGATION      VASCULAR SURGERY         Review of patient's allergies indicates:  No Known Allergies    Medications:  Medications Prior to Admission   Medication Sig    alirocumab (PRALUENT PEN) 150 mg/mL PnIj Inject 1 mL (150 mg total) into the skin every 14 (fourteen) days.    atorvastatin (LIPITOR) 40 MG tablet Take 1 tablet by mouth nightly.    calcitRIOL (ROCALTROL) 0.25 MCG Cap Take 1 capsule by mouth once daily.    cloNIDine (CATAPRES) 0.3 MG tablet Take 1 tablet by mouth 2 (two) times daily.    clopidogreL (PLAVIX) 75 mg tablet Take 1 tablet by mouth once daily.    diphenhydrAMINE (BENADRYL) 25 mg capsule Take 1 capsule (25 mg total) by mouth nightly as needed for Itching.    doxazosin (CARDURA) 8 MG Tab Take 1 tablet by mouth nightly.    ENTRESTO  mg per tablet TAKE 1 TABLET(S) BY MOUTH TWO TIMES A DAY    famotidine (PEPCID) 20 MG tablet Take 1 tablet by mouth once daily.    furosemide (LASIX) 20 MG tablet Take 1 tablet by mouth once daily.    hydrALAZINE (APRESOLINE) 50 MG tablet Take 1 tablet by mouth 2 (two) times a day (morning and before bed).    metoprolol succinate (TOPROL-XL) 100 MG 24 hr tablet Take 1 tablet by mouth once daily.    metroNIDAZOLE (FLAGYL) 500 MG tablet Take 1 tablet in the morning and 1 tablet before bedtime for 7 days.    NIFEdipine (PROCARDIA-XL) 60 MG (OSM) 24 hr tablet  Take 1 tablet by mouth once daily.    ondansetron (ZOFRAN-ODT) 4 MG TbDL Take 1 tablet by mouth.    ondansetron (ZOFRAN-ODT) 4 MG TbDL Take 1 tablet by mouth every 8 (eight) hours as needed for up to 7 days.    pantoprazole (PROTONIX) 40 MG tablet Take 1 tablet by mouth once daily.    prochlorperazine (COMPAZINE) 10 MG tablet Take 1 tablet by mouth every 6 (six) hours as needed for up to 7 days.    promethazine (PHENERGAN) 25 MG tablet Take 1 tablet by mouth 4 (four) times daily as needed for nausea for up to 7 days    thiamine (VITAMIN B-1) 100 MG tablet Take 1 tablet (100 mg total) by mouth once daily.    vancomycin (VANCOCIN) 125 MG capsule Take 1 capsule by mouth in the morning and 1capsule by mouth at noon and 1 capsule by mouth in the evening and 1 capsule by mouth before bedtime for 10 days.    amoxicillin-clavulanate 500-125mg (AUGMENTIN) 500-125 mg Tab Take 1 tablet by mouth every morning for 2 days    blood sugar diagnostic (TRUE METRIX GLUCOSE TEST STRIP MISC) True Metrix Glucose Test Strip   USE TO TEST BLOOD SUGAR 3 TIMES DAILY    blood sugar diagnostic Strp Use to test blood sugar 3 (three) times daily before meals.    blood-glucose meter Misc Use as directed    blood-glucose meter Misc Use to test blood sugar as directed    blood-glucose sensor (DEXCOM G7 SENSOR) Gini Apply one to skin as directed and change q 10 days.    bumetanide (BUMEX) 2 MG tablet Take 1 tablet by mouth once daily.    calcitRIOL (ROCALTROL) 0.25 MCG Cap Take 1 capsule by mouth once daily.    calcium acetate,phosphat bind, (PHOSLO) 667 mg tablet Take 1 tablet by mouth in the morning and 1 tablet at noon and 1 tablet in the evening with meals.    epoetin john (PROCRIT) 20,000 unit/mL injection Inject 1 ml under the skin every two weeks. Administer only if hemoglobin is <10.5.    insulin glargine 100 units/mL SubQ pen Inject 30 Units into the skin nightly.    insulin lispro 100 unit/mL injection Inject 5 Units into the skin 3  (three) times daily with meals. Discard vial 28 days after opening.    insulin lispro 100 unit/mL injection Inject 0-20 Units into the skin 4 (four) times daily before meals and nightly. If BS : 0 units, -200: 4 units, -250: 8 units; -300: 12 units, -350: 16 units, -400: 20 units, BS > 400: 20 units and call your doctor    insulin lispro protamin-lispro (HUMALOG MIX 50-50 KWIKPEN) 100 unit/mL (50-50) InPn Inject 10 Units into the skin 3 (three) times daily before meals.    metoclopramide HCl (REGLAN) 10 MG tablet Take 1 tablet by mouth 4 (four) times daily before meals and nightly.    nitroGLYCERIN (NITROSTAT) 0.4 MG SL tablet Place 1 tablet under the tongue every 5 (five) minutes as needed for chest pain.  Max of 3 tablets in a 15 minute period.    venlafaxine (EFFEXOR-XR) 37.5 MG 24 hr capsule Take 1 capsule by mouth daily     Antibiotics (From admission, onward)      Start     Stop Route Frequency Ordered    12/31/23 0930  DAPTOmycin (CUBICIN) 785 mg in sodium chloride 0.9% SolP 50 mL IVPB         -- IV Every 48 hours (non-standard times) 12/29/23 0839    12/29/23 0200  ceFEPIme (MAXIPIME) 1 g in dextrose 5 % in water (D5W) 100 mL IVPB (MB+)         -- IV Every 24 hours (non-standard times) 12/28/23 2331          Antifungals (From admission, onward)      None          Antivirals (From admission, onward)      None             Immunization History   Administered Date(s) Administered    COVID-19, MRNA, LN-S, PF (MODERNA FULL 0.5 ML DOSE) 03/17/2021, 04/14/2021    Hepatitis B (recombinant) Adjuvanted, 2 dose 10/07/2022, 11/02/2022    Influenza - Quadrivalent - PF *Preferred* (6 months and older) 12/03/2015, 10/19/2016, 09/24/2018, 09/26/2019, 11/12/2020, 12/02/2021, 09/26/2022    Influenza - Trivalent (ADULT) 10/25/2007    Influenza - Trivalent - PF (ADULT) 10/04/2013, 11/25/2014    Pneumococcal Conjugate - 13 Valent 02/03/2020    Pneumococcal Polysaccharide - 23 Valent 01/27/2009     Tdap 07/06/2021       Family History       Problem Relation (Age of Onset)    Colon cancer Father    Heart disease Mother    Hypertension Mother, Brother, Brother    Kidney cancer Sister    Leukemia Sister          Social History     Socioeconomic History    Marital status:     Number of children: 5   Tobacco Use    Smoking status: Never    Smokeless tobacco: Never   Substance and Sexual Activity    Alcohol use: Not Currently    Drug use: Never    Sexual activity: Yes     Partners: Male   Social History Narrative    She lives with her boy friend at home.      Social Determinants of Health     Financial Resource Strain: Low Risk  (12/29/2023)    Overall Financial Resource Strain (CARDIA)     Difficulty of Paying Living Expenses: Not hard at all   Food Insecurity: No Food Insecurity (12/29/2023)    Hunger Vital Sign     Worried About Running Out of Food in the Last Year: Never true     Ran Out of Food in the Last Year: Never true   Transportation Needs: No Transportation Needs (12/29/2023)    PRAPARE - Transportation     Lack of Transportation (Medical): No     Lack of Transportation (Non-Medical): No   Physical Activity: Insufficiently Active (12/29/2023)    Exercise Vital Sign     Days of Exercise per Week: 3 days     Minutes of Exercise per Session: 20 min   Stress: No Stress Concern Present (12/29/2023)    [place] [employer] - Occupational Stress Questionnaire     Feeling of Stress : Not at all   Social Connections: Socially Isolated (12/29/2023)    Social Connection and Isolation Panel [NHANES]     Frequency of Communication with Friends and Family: More than three times a week     Frequency of Social Gatherings with Friends and Family: Once a week     Attends Church Services: Never     Active Member of Clubs or Organizations: No     Attends Club or Organization Meetings: Never     Marital Status:    Housing Stability: Low Risk  (12/29/2023)    Housing Stability Vital  Sign     Unable to Pay for Housing in the Last Year: No     Number of Places Lived in the Last Year: 1     Unstable Housing in the Last Year: No     Review of Systems   Constitutional:  Negative for chills, fatigue, fever and unexpected weight change.   HENT:  Negative for ear pain, facial swelling, hearing loss, mouth sores, nosebleeds, rhinorrhea, sinus pressure, sore throat, tinnitus, trouble swallowing and voice change.    Eyes:  Negative for photophobia, pain, redness and visual disturbance.   Respiratory:  Negative for cough, chest tightness, shortness of breath and wheezing.    Cardiovascular:  Negative for chest pain, palpitations and leg swelling.   Gastrointestinal:  Negative for abdominal pain, blood in stool, constipation, diarrhea, nausea and vomiting.   Endocrine: Negative for cold intolerance, heat intolerance, polydipsia, polyphagia and polyuria.   Genitourinary:  Negative for decreased urine volume, dysuria, flank pain, frequency, hematuria, menstrual problem, urgency, vaginal bleeding, vaginal discharge and vaginal pain.   Musculoskeletal:  Negative for arthralgias, back pain, joint swelling, myalgias and neck pain.   Skin:  Negative for rash.   Allergic/Immunologic: Negative for environmental allergies, food allergies and immunocompromised state.   Neurological:  Negative for dizziness, seizures, syncope, weakness, light-headedness, numbness and headaches.   Hematological:  Negative for adenopathy. Does not bruise/bleed easily.   Psychiatric/Behavioral:  Negative for confusion, hallucinations, self-injury, sleep disturbance and suicidal ideas. The patient is not nervous/anxious.      Objective:     Vital Signs (Most Recent):  Temp: 97.8 °F (36.6 °C) (12/29/23 1431)  Pulse: 90 (12/29/23 1800)  Resp: 20 (12/29/23 1605)  BP: (!) 142/68 (12/29/23 1548)  SpO2: 98 % (12/29/23 1605) Vital Signs (24h Range):  Temp:  [97.8 °F (36.6 °C)-98.9 °F (37.2 °C)] 97.8 °F (36.6 °C)  Pulse:  [72-93] 90  Resp:   [14-20] 20  SpO2:  [91 %-100 %] 98 %  BP: (104-184)/(58-83) 142/68     Weight: 78.5 kg (173 lb)  Body mass index is 27.1 kg/m².    Estimated Creatinine Clearance: 16.3 mL/min (A) (based on SCr of 4.1 mg/dL (H)).     Physical Exam  Vitals and nursing note reviewed.   Constitutional:       Appearance: She is well-developed.   HENT:      Head: Normocephalic and atraumatic.      Right Ear: External ear normal.      Left Ear: External ear normal.   Eyes:      General: No scleral icterus.        Right eye: No discharge.         Left eye: No discharge.      Conjunctiva/sclera: Conjunctivae normal.   Cardiovascular:      Rate and Rhythm: Normal rate and regular rhythm.      Heart sounds: Murmur heard.      No friction rub. No gallop.   Pulmonary:      Effort: Pulmonary effort is normal. No respiratory distress.      Breath sounds: Normal breath sounds. No stridor. No wheezing or rales.   Abdominal:      General: Bowel sounds are normal.   Musculoskeletal:         General: No tenderness. Normal range of motion.   Skin:     General: Skin is warm and dry.   Neurological:      Mental Status: She is alert and oriented to person, place, and time.          Significant Labs: Blood Culture:   Recent Labs   Lab 12/29/23  0004   LABBLOO No Growth to date  No Growth to date     BMP:   Recent Labs   Lab 12/29/23  0004   *  309*   *  135*   K 4.1  4.1   CL 97  97   CO2 26  26   BUN 21*  21*   CREATININE 4.1*  4.1*   CALCIUM 7.6*  7.6*     CBC:   Recent Labs   Lab 12/29/23  0004 12/29/23  0545 12/29/23  0917   WBC 5.08  4.86 5.78 5.13   HGB 7.4*  7.6* 7.7* 7.9*   HCT 23.2*  23.6* 24.7* 25.1*   *  105* 109* 110*     Microbiology Results (last 7 days)       Procedure Component Value Units Date/Time    Blood culture [[id number]] Collected: 12/29/23 0004    Order Status: Completed Specimen: Blood Updated: 12/29/23 0715     Blood Culture, Routine No Growth to date    Blood culture [[id number]] Collected:  12/29/23 0004    Order Status: Completed Specimen: Blood Updated: 12/29/23 0715     Blood Culture, Routine No Growth to date            Significant Imaging: I have reviewed all pertinent imaging results/findings within the past 24 hours.

## 2023-12-30 NOTE — ASSESSMENT & PLAN NOTE
- S/P PCI D2 with SHU x 1 (07/13/2016)  - S/P PCI OM1 with SHU x 1 (01/04/2017)  - S/P PCI prox D2 with SHU x 1 (03/07/2017)  - Patent stents with NCCAD by [facility] 12/10/2019  - S/P [facility] with RCA 90 % stenosis, SHU x 3 (4/5/2022)    Most recent C during admission. Continue aspirin, heparin drip.

## 2023-12-30 NOTE — PROGRESS NOTES
[facility] - Cardiology Stepdown  Endocrinology  Progress Note    Admit Date: 12/28/2023     Reason for Consult: Management of VANESSA (managed as T1DM), Hyperglycemia     Diabetes diagnosis year: 2008 (likely had issues with decreasing pancreatic function prior)    Home Diabetes Medications:  Lantus 30 units nightly; Humalog 5 units with meals + SSI (ISF 1:10)    How often checking glucose at home? >4 x day (Dexcom G6)  BG readings on regimen: 100-150's  Hypoglycemia on the regimen?  No  Missed doses on regimen?  No    Diabetes Complications include:     Hyperglycemia, Hypoglycemia , Hypoglycemia unawareness, Diabetic chronic kidney disease     , Diabetic retinopathy , Diabetic cataract , and Diabetic peripheral neuropathy     Complicating diabetes co morbidities:   HTN; HLD; CAD; ESRD on HD      HPI: Ellen Roman is a 57 yo female with hx of DM1, HTN, HLD, CAD s/p PCI with AICD placement, Mitral Regurgitation s/p clip, and ESRD on iHD MWF who presents as a transfer from OSH for higher level of care.  According to records, patient initially presented to OSH on 12/18 with chief complaint of R sided chest pain associated with N/V x 2 days with low grade temp.  Cardiology evaluated there and performed a LCH on 12/20 but no PCI was performed and they recommended medical management for her CAD.  Her BC grew MRSA bacteremia that remained positive during the admission with source suspected to be from her RIJ Tunneled dialysis catheter. The TDC was removed and IR was consulted for tempoary line placement and she was found to have a thrombosed LIJ from her previous catheter and a temporary catheter had to be placed to the R femoral vein.  Her BC continued to remain positive despite treatment with Vancomycin and a JAVY was ordered with findings of vegetations to the AICD lead wire and [place] [facility] [place]. Endocrine consulted to manage type 1 diabetes. Of note, chart has mixed information  "regarding T1DM vs. T2DM. Suppressed C-peptide noted at [facility], but cannot find antibody labs to confirm VANESSA vs. Pancreatic DM.      Lab Results   Component Value Date    HGBA1C 8.0 (H) 2023             Interval HPI:   No acute events overnight. Patient in room 322/322 A. Blood glucose variable. BG at, above, and below goal on current insulin regimen (SSI, prandial, and basal insulin ). Steroid use- None.   1 Day Post-Op  Renal function- Abnormal - 5.9 Cr    Vasopressors-  None     Diet Cardiac     Eatin%  Nausea: No  Hypoglycemia and intervention: Yes, BG 51 overnight, asymptomatic, recheck  (per nursing note). Received dextrose.  Fever: No  TPN and/or TF: No      BP (!) 148/70   Pulse 86   Temp 98.4 °F (36.9 °C) (Oral)   Resp 16   Ht 5' 7" (1.702 m)   Wt 78.5 kg (173 lb)   LMP  (LMP Unknown)   SpO2 97%   BMI 27.10 kg/m²     Labs Reviewed and Include    Recent Labs   Lab 23  0705   *   CALCIUM 8.6*   ALBUMIN 2.3*   PROT 6.7   *   K 4.8   CO2 18*   CL 96   BUN 28*   CREATININE 5.9*   ALKPHOS 64   ALT 8*   AST 15   BILITOT 0.4     Lab Results   Component Value Date    WBC 5.01 2023    HGB 7.8 (L) 2023    HCT 24.8 (L) 2023    MCV 94 2023     (L) 2023     No results for input(s): "TSH", "FREET4" in the last 168 hours.  Lab Results   Component Value Date    HGBA1C 8.0 (H) 2023       Nutritional status:   Body mass index is 27.1 kg/m².  Lab Results   Component Value Date    ALBUMIN 2.3 (L) 2023    ALBUMIN 2.2 (L) 2023    ALBUMIN 2.2 (L) 2023     No results found for: "PREALBUMIN"    Estimated Creatinine Clearance: 11.4 mL/min (A) (based on SCr of 5.9 mg/dL (H)).    Accu-Checks  Recent Labs     23  0740 23  1228 23  1256 23  1432 23  1647 23  2221 23  2314   POCTGLUCOSE 342* 167* 208* 171* 115* 51* 136* 159*       Current " Medications and/or Treatments Impacting Glycemic Control  Immunotherapy:    Immunosuppressants       None          Steroids:   Hormones (From admission, onward)      Start     Stop Route Frequency Ordered    12/29/23 0020  melatonin tablet 3 mg         -- Oral Nightly PRN 12/28/23 2331          Pressors:    Autonomic Drugs (From admission, onward)      None          Hyperglycemia/Diabetes Medications:   Antihyperglycemics (From admission, onward)      Start     Stop Route Frequency Ordered    12/30/23 1130  insulin aspart U-100 pen 4 Units         -- SubQ 3 times daily with meals 12/30/23 0855    12/30/23 0900  insulin detemir U-100 (Levemir) pen 12 Units         -- SubQ 2 times daily 12/29/23 1200    12/30/23 0900  insulin aspart U-100 pen 4 Units         -- SubQ Once 12/30/23 0855    12/30/23 0855  insulin aspart U-100 pen 0-5 Units         -- SubQ Before meals & nightly PRN 12/30/23 0855            ASSESSMENT and PLAN    Renal/  ESRD (end stage renal disease)  Titrate insulin slowly to avoid hypoglycemia as the risk of hypoglycemia increases with decreased creatinine clearance.      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 Please notify endocrinology prior to discharge.        Carissa  LINDA Rosas  Endocrinology  [facility] - Cardiology Stepdown

## 2023-12-30 NOTE — SUBJECTIVE & OBJECTIVE
Interval History: Stable without changes. Pending JAVY read, but large vegetation on TTE. Device interrogation showed she is not device dependent.    Review of Systems   Constitutional: Negative for diaphoresis and fever.   Cardiovascular:  Negative for chest pain, dyspnea on exertion, leg swelling, near-syncope, orthopnea, palpitations, paroxysmal nocturnal dyspnea and syncope.   Respiratory:  Negative for cough and shortness of breath.    Gastrointestinal:  Negative for abdominal pain, diarrhea, nausea and vomiting.   Neurological:  Negative for light-headedness.   Psychiatric/Behavioral:  Negative for altered mental status and substance abuse.      Objective:     Vital Signs (Most Recent):  Temp: 98.4 °F (36.9 °C) (12/30/23 0749)  Pulse: 83 (12/30/23 1100)  Resp: 16 (12/30/23 0800)  BP: (!) 153/68 (12/30/23 1100)  SpO2: 97 % (12/30/23 0519) Vital Signs (24h Range):  Temp:  [97.5 °F (36.4 °C)-99 °F (37.2 °C)] 98.4 °F (36.9 °C)  Pulse:  [80-97] 83  Resp:  [14-20] 16  SpO2:  [93 %-100 %] 97 %  BP: (104-184)/(58-87) 153/68     Weight: 78.5 kg (173 lb)  Body mass index is 27.1 kg/m².     SpO2: 97 %        Physical Exam  Constitutional:       Appearance: Normal appearance.   Cardiovascular:      Rate and Rhythm: Normal rate and regular rhythm.      Pulses: Normal pulses.      Heart sounds: Normal heart sounds.   Pulmonary:      Effort: Pulmonary effort is normal.      Breath sounds: Normal breath sounds. No wheezing or rales.   Abdominal:      General: Abdomen is flat. There is no distension.      Palpations: Abdomen is soft.      Tenderness: There is no abdominal tenderness.   Musculoskeletal:      Right lower leg: No edema.      Left lower leg: No edema.   Skin:     General: Skin is warm and dry.   Neurological:      Mental Status: She is alert and oriented to person, place, and time. Mental status is at baseline.   Psychiatric:         Mood and Affect: Mood normal.         Behavior: Behavior normal.             Significant Labs: All pertinent lab results from the last 24 hours have been reviewed.    Significant Imaging:  Reviewed

## 2023-12-30 NOTE — PROGRESS NOTES
[facility] - Cardiology [facility] Medicine  Progress Note    Patient Name: Ellen Roman  MRN: [id number]  Patient Class: IP- Inpatient   Admission Date: 12/28/2023  Length of Stay: 2 days  Attending Physician: Yamilet Boyce MD  Primary Care Provider: Paco Amanda MD        Subjective:     Principal Problem:MRSA bacteremia        HPI:  Ellen Roman is 58 year old female with PMHx significant for T1DM c/b gastroparesis, retinopathy and nephropathy - ESRD on HD, HTN, HLD, CAD s/p prior PCI with ischemic cardiomyopathy s/p AICD placement, chronic thrombocytopenia, severe mitral regurgitation status post MitraClip, history of fungemia/bacteremia initially presenting to [facility] ER on 12/18 with a complaints of right-sided chest pain associated with nausea and vomiting multiple episodes for the prior 2 days.     She also reported low-grade temperature. Patient reports generalized weakness. Cardiology performed LHC on 12/20/2023 for evaluation of this chest pain with recommendation for medical management for coronary artery disease. Course is then complicated by refractory MRSA bacteremia. HM team initially suspected source of bacteremia was a tunneled dialysis catheter. This was removed upon admission and patient had placement of left IJ. The placement of left IJ trialysis catheter was c/b IJ thrombosis for which she is started on provoked DVT management - currently with lovenox though with ESRD would consider transition to heparin. IR had difficulty placing the right IJ due to thrombosis as well and access subsequently was placed on the right femoral. Due to persistent bacteremia despite vancomycin, patient subsequently had JAVY 12/27/23 with findings reported as positive for vegetations present on the ICD lead wire. As per OS cardiologist, requested transfer from [facility] to [facility]. 2018 CRT-D [employer].     Current medications:  aspirin 81 mg Oral Daily   atorvastatin 40 mg Oral Nightly   cefepime 1 g  Intravenous Q24H   DAPTOmycin 6 mg/kg Intravenous Q48H since 12/26  enoxaparin 1 mg/kg (Ideal) Subcutaneous Nightly   epoetin john 100 Units/kg Subcutaneous Once per day on Tuesday Thursday Saturday   hydrALAZINE 25 mg Oral TID   insulin glargine 10 Units Subcutaneous Nightly   insulin lispro 0-10 Units Subcutaneous AC & HS   ipratropium-albuteroL 3 mL Nebulization Q6H   metoprolol succinate XL 50 mg Oral Daily   pantoprazole 40 mg Oral QAM   pregabalin 50 mg Oral Daily since 12/27  rifAMPin 300 mg Oral Daily   sacubitriL-valsartan 1 tablet Oral BID   vancomycin 15 mg/kg Intravenous since 12/18    Overview/Hospital Course:  ID, Nephro, EP consulted on admission. JAVY performed 12/29. TTE 12/29:       Left Ventricle: The left ventricle is normal in size. Increased ventricular mass. Increased wall thickness. There is concentric hypertrophy. Normal wall motion. There is normal systolic function with a visually estimated ejection fraction of 60 - 65%. Grade I diastolic dysfunction.    Right Ventricle: Normal right ventricular cavity size. Systolic function is normal. Pacemaker lead present in the ventricle with a 0.7 x 1.8 cm mobile, echogenic structure consistent with a vegetation.    Left Atrium: Left atrium is moderately dilated.    Right Atrium: Right atrium is mildly dilated.    Mitral Valve: The mitral valve is repaired by MitraClip. There is mild regurgitation.    Tricuspid Valve: The tricuspid valve is structurally normal. There is moderate regurgitation. The vegetation on the ventricular lead appears adjacent to but not adherent to the septal leaflet.    Pulmonary Artery: There is pulmonary hypertension. The estimated pulmonary artery systolic pressure is 76 mmHg.    IVC/SVC: Elevated venous pressure at 15 mmHg.    Pericardium: There is a trivial posterior effusion. No indication of cardiac tamponade.    ID changed antibiotics to cefepime and dapto. Endocrine consulted for diabetes management. Nephro consulted  for HD needs. Lasix increased to 80mg qday. Patient was continued on heparin gtt for thrombosed IJ. JAVY w/RV pacer lead w/0.7 x 1.8 cm mobile, echogenic structure consistent with a vegetation.     Interval History: No complaints this am. No sob, chest pain, n/v, fevers, chills, night sweats, abd pain, diarrhea, constipation. Underwent HD this am.    Objective:     Vital Signs (Most Recent):  Temp: 98.6 °F (37 °C) (12/30/23 1309)  Pulse: 88 (12/30/23 1309)  Resp: 20 (12/30/23 1309)  BP: (!) 158/79 (12/30/23 1309)  SpO2: 98 % (12/30/23 1309) Vital Signs (24h Range):  Temp:  [97.5 °F (36.4 °C)-99 °F (37.2 °C)] 98.6 °F (37 °C)  Pulse:  [80-97] 88  Resp:  [15-20] 20  SpO2:  [93 %-98 %] 98 %  BP: (129-184)/(64-87) 158/79     Weight: 78.5 kg (173 lb)  Body mass index is 27.1 kg/m².    Intake/Output Summary (Last 24 hours) at 12/30/2023 1508  Last data filed at 12/30/2023 1130  Gross per 24 hour   Intake 650 ml   Output 3650 ml   Net -3000 ml         Physical Exam  Gen: in NAD, appears stated age  Neuro: AAOx3, motor, sensory, and strength grossly intact BL  HEENT: NTNC, EOMI, PERRL, MMM, nodular presence of the Rt IJ area  CVS: RRR, no m/r/g; S1/S2 auscultated with no S3 or S4; capillary refill < 2 sec  Resp: lungs CTAB, no w/r/r; no belabored breathing or accessory muscle use appreciated   Abd: BS+ in all 4 quadrants; NTND, soft to palpation; no organomegaly appreciated   Extrem: pulses full, equal, and regular over all 4 extremities; no UE edema, BL LE edema, Rt fem CVL    Significant Labs: All pertinent labs within the past 24 hours have been reviewed.  Blood Culture:   Recent Labs   Lab 12/29/23  0004   LABBLOO Gram stain aer bottle: Gram positive cocci in clusters resembling Staph  Results called to and read back by: Mary Lopez RN 12/30/2023  09:26  No Growth to date  No Growth to date     CBC:   Recent Labs   Lab 12/29/23  0545 12/29/23  0917 12/30/23  0502   WBC 5.78 5.13 5.01   HGB 7.7* 7.9* 7.8*   HCT  "24.7* 25.1* 24.8*   * 110* 132*     CMP:   Recent Labs   Lab 12/29/23  0004 12/30/23  0705   *  135* 129*   K 4.1  4.1 4.8   CL 97  97 96   CO2 26  26 18*   *  309* 233*   BUN 21*  21* 28*   CREATININE 4.1*  4.1* 5.9*   CALCIUM 7.6*  7.6* 8.6*   PROT 5.9* 6.7   ALBUMIN 2.2*  2.2* 2.3*   BILITOT 0.4 0.4   ALKPHOS 60 64   AST 8* 15   ALT 8* 8*   ANIONGAP 12  12 15     Respiratory Culture: No results for input(s): "GSRESP", "RESPIRATORYC" in the last 48 hours.  Troponin: No results for input(s): "TROPONINI", "TROPONINIHS" in the last 48 hours.    Significant Imaging: I have reviewed all pertinent imaging results/findings within the past 24 hours.    JAVY:     Left Ventricle: The left ventricle is normal in size. Increased ventricular mass. Increased wall thickness. There is concentric hypertrophy. Normal wall motion. There is normal systolic function with a visually estimated ejection fraction of 60 - 65%. Grade I diastolic dysfunction.    Right Ventricle: Normal right ventricular cavity size. Systolic function is normal. Pacemaker lead present in the ventricle with a 0.7 x 1.8 cm mobile, echogenic structure consistent with a vegetation.    Left Atrium: Left atrium is moderately dilated.    Right Atrium: Right atrium is mildly dilated.    Mitral Valve: The mitral valve is repaired by MitraClip. There is mild regurgitation.    Tricuspid Valve: The tricuspid valve is structurally normal. There is moderate regurgitation. The vegetation on the ventricular lead appears adjacent to but not adherent to the septal leaflet.    Pulmonary Artery: There is pulmonary hypertension. The estimated pulmonary artery systolic pressure is 76 mmHg.    IVC/SVC: Elevated venous pressure at 15 mmHg.    Pericardium: There is a trivial posterior effusion. No indication of cardiac tamponade.    Assessment/Plan:      * MRSA bacteremia  - MRSA on OSH Bcx since 12/18. 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  Last A1c reviewed-   Lab Results   Component Value Date    HGBA1C 8.0 (H) 12/19/2023     Most recent fingerstick glucose reviewed-   Recent Labs   Lab 12/29/23 2008 12/29/23  2221 12/29/23  2314 12/30/23  1222   POCTGLUCOSE 51* 136* 159* 205*       Current correctional scale  Medium  Maintain anti-hyperglycemic dose as follows-   Antihyperglycemics (From admission, onward)      Start     Stop Route Frequency Ordered    12/30/23 1130  insulin aspart U-100 pen 4 Units         -- SubQ 3 times daily with meals 12/30/23 0855    12/30/23 0900  insulin detemir U-100 (Levemir) pen 12 Units         -- SubQ 2 times daily 12/29/23 1200    12/30/23 0900  insulin aspart U-100 pen 4 Units         -- SubQ Once 12/30/23 0855    12/30/23 0855  insulin aspart U-100 pen 0-5 Units         -- SubQ Before meals & nightly PRN 12/30/23 0855          Hold Oral hypoglycemics while patient is in the hospital.    Infection associated with cardiac device  - see MRSA bacteremia       Chronic kidney disease-mineral and bone disorder  Creatine stable for now. BMP reviewed- noted Estimated Creatinine Clearance: 11.4 mL/min (A) (based on SCr of 5.9 mg/dL (H)). according to latest data. Based on current GFR, CKD stage is end stage.  Monitor UOP and serial BMP and adjust therapy as needed.  Renally dose meds. Avoid nephrotoxic medications and procedures.    - continue calcium acetate    Anemia due to stage 5 chronic kidney disease, not on chronic dialysis  Patient's anemia is currently uncontrolled. Has not received any PRBCs to date. Etiology likely d/t chronic disease due to Chronic Kidney Disease/ESRD  Current CBC reviewed-   Lab Results   Component Value Date    HGB 7.8 (L) 12/30/2023    HCT 24.8 (L) 12/30/2023     Monitor serial CBC and transfuse if patient becomes hemodynamically unstable, symptomatic or H/H drops below 7/21.  - continue epo    ESRD (end stage renal disease)  - ESRD with trialysis line removed.   - Nephrology consulted- Rt fem CVL- will discuss with nephro and ID about more permanent HD needs- will need clearance of blood cultures and ICD removal     Cardiac resynchronization therapy defibrillator (CRT-D) in place  - [employer] CRT-D implanted 2018      Nonrheumatic mitral valve regurgitation  - S/p mitraclip       Coronary artery disease involving native coronary artery of native heart without angina pectoris  - S/P PCI D2 with SHU x 1 (07/13/2016)  - S/P PCI OM1 with SHU x 1 (01/04/2017)  - S/P PCI prox D2 with SHU x 1 (03/07/2017)  - Patent stents with NCCAD by [facility] 12/10/2019  - S/P [facility] with RCA 90 % stenosis, HSU x 3 (4/5/2022)    Most recent LHC during admission. Continue aspirin, heparin drip.      VTE Risk Mitigation (From admission, onward)           Ordered     heparin (porcine) injection 1,000 Units  As needed (PRN)         12/30/23 1102     heparin 25,000 units in dextrose 5% (100 units/ml) IV bolus from bag - ADDITIONAL PRN BOLUS - 60 units/kg  As needed (PRN)        Question:  Heparin Infusion Adjustment (DO NOT MODIFY ANSWER)  Answer:  [email or web address]\Images\Pharmacy\HeparinInfusions\heparin HIGH INTENSITY nomogram for [facility] [id number]    12/29/23 0901     heparin 25,000 units in dextrose 5% (100 units/ml) IV bolus from bag - ADDITIONAL PRN BOLUS - 30  units/kg  As needed (PRN)        Question:  Heparin Infusion Adjustment (DO NOT MODIFY ANSWER)  Answer:  [email or web address]\epic\Images\Pharmacy\HeparinInfusions\heparin HIGH INTENSITY nomogram for [facility] [id number]    12/29/23 0901     heparin 25,000 units in dextrose 5% 250 mL (100 units/mL) infusion HIGH INTENSITY nomogram - [facility]  Continuous        Question:  Begin at (units/kg/hr)  Answer:  18    12/29/23 0901     heparin (porcine) injection 3,000 Units  Daily PRN         12/28/23 3251                    Discharge Planning   VIRA: 1/2/2024     Code Status: Full Code   Is the patient medically ready for discharge?: No    Reason for patient still in hospital (select all that apply): Patient trending condition  Discharge Plan A: Home with family, Home Health                    Yamilet Boyce MD  Department of Hospital Medicine   [facility] - Cardiology Stepdown

## 2023-12-30 NOTE — PLAN OF CARE
Problem: Adult Inpatient Plan of Care  Goal: Plan of Care Review  Outcome: Ongoing, Progressing  Goal: Patient-Specific Goal (Individualized)  Outcome: Ongoing, Progressing  Goal: Absence of Hospital-Acquired Illness or Injury  Outcome: Ongoing, Progressing  Goal: Optimal Comfort and Wellbeing  Outcome: Ongoing, Progressing  Goal: Readiness for Transition of Care  Outcome: Ongoing, Progressing     Problem: Diabetes Comorbidity  Goal: Blood Glucose Level Within Targeted Range  Outcome: Ongoing, Progressing     Problem: Fluid and Electrolyte Imbalance (Acute Kidney Injury/Impairment)  Goal: Fluid and Electrolyte Balance  Outcome: Ongoing, Progressing     Problem: Infection  Goal: Absence of Infection Signs and Symptoms  Outcome: Ongoing, Progressing     Problem: Infection (Hemodialysis)  Goal: Absence of Infection Signs and Symptoms  Outcome: Ongoing, Progressing     Problem: Hemodynamic Instability (Hemodialysis)  Goal: Effective Tissue Perfusion  Outcome: Ongoing, Progressing

## 2023-12-30 NOTE — PROGRESS NOTES
[facility] - Cardiology Stepdown  Cardiac Electrophysiology  Progress Note    Admission Date: 12/28/2023  Code Status: Full Code   Attending Physician: Yamilet Boyce MD   Expected Discharge Date: 1/2/2024  Principal Problem:MRSA bacteremia    Subjective:     Interval History: Stable without changes. Pending JAVY read, but large vegetation on TTE. Device interrogation showed she is not device dependent.    Review of Systems   Constitutional: Negative for diaphoresis and fever.   Cardiovascular:  Negative for chest pain, dyspnea on exertion, leg swelling, near-syncope, orthopnea, palpitations, paroxysmal nocturnal dyspnea and syncope.   Respiratory:  Negative for cough and shortness of breath.    Gastrointestinal:  Negative for abdominal pain, diarrhea, nausea and vomiting.   Neurological:  Negative for light-headedness.   Psychiatric/Behavioral:  Negative for altered mental status and substance abuse.      Objective:     Vital Signs (Most Recent):  Temp: 98.4 °F (36.9 °C) (12/30/23 0749)  Pulse: 83 (12/30/23 1100)  Resp: 16 (12/30/23 0800)  BP: (!) 153/68 (12/30/23 1100)  SpO2: 97 % (12/30/23 0519) Vital Signs (24h Range):  Temp:  [97.5 °F (36.4 °C)-99 °F (37.2 °C)] 98.4 °F (36.9 °C)  Pulse:  [80-97] 83  Resp:  [14-20] 16  SpO2:  [93 %-100 %] 97 %  BP: (104-184)/(58-87) 153/68     Weight: 78.5 kg (173 lb)  Body mass index is 27.1 kg/m².     SpO2: 97 %        Physical Exam  Constitutional:       Appearance: Normal appearance.   Cardiovascular:      Rate and Rhythm: Normal rate and regular rhythm.      Pulses: Normal pulses.      Heart sounds: Normal heart sounds.   Pulmonary:      Effort: Pulmonary effort is normal.      Breath sounds: Normal breath sounds. No wheezing or rales.   Abdominal:      General: Abdomen is flat. There is no distension.      Palpations: Abdomen is soft.      Tenderness: There is no abdominal tenderness.   Musculoskeletal:      Right lower leg: No edema.      Left lower leg: No edema.    Skin:     General: Skin is warm and dry.   Neurological:      Mental Status: She is alert and oriented to person, place, and time. Mental status is at baseline.   Psychiatric:         Mood and Affect: Mood normal.         Behavior: Behavior normal.            Significant Labs: All pertinent lab results from the last 24 hours have been reviewed.    Significant Imaging:  Reviewed  Assessment and Plan:     * MRSA bacteremia  #CRT-D in place  #Infection associated with cardiac device  58yoF with T1DM, ESRD on HD, Cad s/p PCI, iCM with prior EF 25-30% recovered to 55-60% with CRT-D in place (dual-coiled 2018 [employer] CRT-D), severe MR s/p MitraClip, and chronic thrombocytopenia is here as a transfer with bacteremia, vegetation involving her RV lead per outside JAVY. Unable to get good visualization with current imaging. Discussed with echo staff regarding need for further imaging lead extraction.     TTE with 0.7cmx1.8cm vegetation without evidence of TV involvement  JAVY pending read  Bcx MRSA + still  HD per groin trialysis  Formal interrogation showed she is not device-dependent    Recommendations:  - Plan for lead extraction early this week        Connor M Gillies, MD  Cardiac Electrophysiology  [facility] - Cardiology Stepdown

## 2023-12-30 NOTE — ASSESSMENT & PLAN NOTE
Titrate insulin slowly to avoid hypoglycemia as the risk of hypoglycemia increases with decreased creatinine clearance.

## 2023-12-30 NOTE — ASSESSMENT & PLAN NOTE
Creatine stable for now. BMP reviewed- noted Estimated Creatinine Clearance: 16.3 mL/min (A) (based on SCr of 4.1 mg/dL (H)). according to latest data. Based on current GFR, CKD stage is end stage.  Monitor UOP and serial BMP and adjust therapy as needed. Renally dose meds. Avoid nephrotoxic medications and procedures.    - continue calcium acetate

## 2023-12-30 NOTE — ASSESSMENT & PLAN NOTE
Patient's anemia is currently uncontrolled. Has not received any PRBCs to date. Etiology likely d/t chronic disease due to Chronic Kidney Disease/ESRD  Current CBC reviewed-   Lab Results   Component Value Date    HGB 7.9 (L) 12/29/2023    HCT 25.1 (L) 12/29/2023     Monitor serial CBC and transfuse if patient becomes hemodynamically unstable, symptomatic or H/H drops below 7/21.  - continue epo

## 2023-12-30 NOTE — ASSESSMENT & PLAN NOTE
BG goal 140-180    - Levemir (Insulin Detemir) 12 units BID (initiated based on home insulin pump basal settings)   - Decrease Novolog (Insulin Aspart) to 4 units (0.3 u/kg/day) due to post-prandial hypoglycemia   - LDC (150/50) prn ac/hs for hyperglycemia   - BG checks AC/HS  - Hypoglycemia protocol in place  - If blood glucose greater than 300, please ask patient not to eat food or drink anything other than water until correctional insulin has brought it back below 250    ** Please notify Endocrine for any change and/or advance in diet**  ** Please call Endocrine for any BG related issues **    Discharge Planning:   TBD. Please notify endocrinology prior to discharge.

## 2023-12-30 NOTE — PROGRESS NOTES
Latest Reference Range & Units 12/29/23 20:08   POCT Glucose 70 - 110 mg/dL 51 (L)       Patient asymptomatic.  Repeat .

## 2023-12-31 NOTE — SUBJECTIVE & OBJECTIVE
Interval History: No complaints this am. Hyperglycemia last PM- associated nausea- improved this am. PRN insulin regular administered last PM. No findings of DKA. No sob, chest pain, n/v, fevers, chills, night sweats, abd pain, diarrhea, constipation.    Objective:     Vital Signs (Most Recent):  Temp: 98.3 °F (36.8 °C) (12/31/23 1458)  Pulse: 92 (12/31/23 1508)  Resp: 18 (12/31/23 1458)  BP: 130/60 (12/31/23 1458)  SpO2: (!) 93 % (12/31/23 1458) Vital Signs (24h Range):  Temp:  [98 °F (36.7 °C)-99.2 °F (37.3 °C)] 98.3 °F (36.8 °C)  Pulse:  [] 92  Resp:  [18-20] 18  SpO2:  [93 %-97 %] 93 %  BP: (130-179)/(60-79) 130/60     Weight: 78.5 kg (173 lb)  Body mass index is 27.1 kg/m².  No intake or output data in the 24 hours ending 12/31/23 1513        Physical Exam  Gen: in NAD, appears stated age  Neuro: AAOx3, motor, sensory, and strength grossly intact BL  HEENT: NTNC, EOMI, PERRL, MMM, nodular presence of the Rt IJ area  CVS: RRR, no m/r/g; S1/S2 auscultated with no S3 or S4; capillary refill < 2 sec  Resp: lungs CTAB, no w/r/r; no belabored breathing or accessory muscle use appreciated   Abd: BS+ in all 4 quadrants; NTND, soft to palpation; no organomegaly appreciated   Extrem: pulses full, equal, and regular over all 4 extremities; no UE edema, BL LE edema, Rt fem CVL    Significant Labs: All pertinent labs within the past 24 hours have been reviewed.  Blood Culture:   Recent Labs   Lab 12/30/23  1739 12/30/23  1741   LABBLOO No Growth to date No Growth to date     CBC:   Recent Labs   Lab 12/30/23  0502 12/31/23  0410   WBC 5.01 6.19   HGB 7.8* 7.8*   HCT 24.8* 24.7*   * 149*     CMP:   Recent Labs   Lab 12/30/23  0705 12/30/23  1455 12/30/23  1834 12/31/23  0410   * 132* 132* 137   K 4.8 3.9 4.0 4.0   CL 96 95 98 99   CO2 18* 24 22* 24   * 345* 441* 221*   BUN 28* 12 15 18   CREATININE 5.9* 3.6* 3.8* 4.4*   CALCIUM 8.6* 8.2* 8.1* 8.5*   PROT 6.7  --  6.2 5.9*   ALBUMIN 2.3*  --  2.2*  "2.1*   BILITOT 0.4  --  0.3 0.3   ALKPHOS 64  --  59 53*   AST 15  --  10 10   ALT 8*  --  6* 6*   ANIONGAP 15 13 12 14     Respiratory Culture: No results for input(s): "GSRESP", "RESPIRATORYC" in the last 48 hours.  Troponin: No results for input(s): "TROPONINI", "TROPONINIHS" in the last 48 hours.    Significant Imaging: I have reviewed all pertinent imaging results/findings within the past 24 hours.    JAVY:     Left Ventricle: The left ventricle is normal in size. Increased ventricular mass. Increased wall thickness. There is concentric hypertrophy. Normal wall motion. There is normal systolic function with a visually estimated ejection fraction of 60 - 65%. Grade I diastolic dysfunction.    Right Ventricle: Normal right ventricular cavity size. Systolic function is normal. Pacemaker lead present in the ventricle with a 0.7 x 1.8 cm mobile, echogenic structure consistent with a vegetation.    Left Atrium: Left atrium is moderately dilated.    Right Atrium: Right atrium is mildly dilated.    Mitral Valve: The mitral valve is repaired by MitraClip. There is mild regurgitation.    Tricuspid Valve: The tricuspid valve is structurally normal. There is moderate regurgitation. The vegetation on the ventricular lead appears adjacent to but not adherent to the septal leaflet.    Pulmonary Artery: There is pulmonary hypertension. The estimated pulmonary artery systolic pressure is 76 mmHg.    IVC/SVC: Elevated venous pressure at 15 mmHg.    Pericardium: There is a trivial posterior effusion. No indication of cardiac tamponade.  "

## 2023-12-31 NOTE — PROGRESS NOTES
[facility] - [facility] Stepdown  Endocrinology  Progress Note    Admit Date: 12/28/2023     Reason for Consult: Management of VANESSA (managed as T1DM), Hyperglycemia     Diabetes diagnosis year: 2008 (likely had issues with decreasing pancreatic function prior)    Home Diabetes Medications:  Lantus 30 units nightly; Humalog 5 units with meals + SSI (ISF 1:10)    How often checking glucose at home? >4 x day (Dexcom G6)  BG readings on regimen: 100-150's  Hypoglycemia on the regimen?  No  Missed doses on regimen?  No    Diabetes Complications include:     Hyperglycemia, Hypoglycemia , Hypoglycemia unawareness, Diabetic chronic kidney disease     , Diabetic retinopathy , Diabetic cataract , and Diabetic peripheral neuropathy     Complicating diabetes co morbidities:   HTN; HLD; CAD; ESRD on HD      HPI: Ellen Roman is a 57 yo female with hx of DM1, HTN, HLD, CAD s/p PCI with AICD placement, Mitral Regurgitation s/p clip, and ESRD on iHD MWF who presents as a transfer from OSH for higher level of care.  According to records, patient initially presented to OSH on 12/18 with chief complaint of R sided chest pain associated with N/V x 2 days with low grade temp.  Cardiology evaluated there and performed a LCH on 12/20 but no PCI was performed and they recommended medical management for her CAD.  Her BC grew MRSA bacteremia that remained positive during the admission with source suspected to be from her RIJ Tunneled dialysis catheter. The TDC was removed and IR was consulted for tempoary line placement and she was found to have a thrombosed LIJ from her previous catheter and a temporary catheter had to be placed to the R femoral vein.  Her BC continued to remain positive despite treatment with Vancomycin and a JAVY was ordered with findings of vegetations to the AICD lead wire and she was then transferred to [facility] [place] [facility] for higher level of care. Endocrine consulted to manage type 1 diabetes. Of note, chart has mixed information  "regarding T1DM vs. T2DM. Suppressed C-peptide noted at [facility], but cannot find antibody labs to confirm VANESSA vs. Pancreatic DM.      Lab Results   Component Value Date    HGBA1C 8.0 (H) 2023             Interval HPI:   Significant BG excursions >400s noted overnight, with N/V. Negative DKA workup. pt attributes N/V to eating 'bad jambalaya' around 1:45 PM after dialysis yesterday, states whenever she vomits her BG goes up. However, unclear if BG excursions related to acute stress vs. Reduction of prandial insulin doses vs. Not receiving SSI with meal time dose/not receiving meal time insulin (states she ate at 1:45 PM and had nothing to eat after however scheduled novolog given at 4:45 PM).     Patient in room 322/322 A. Blood glucose improving. BG above goal on current insulin regimen (SSI, prandial, and basal insulin ). Of note, SSI not given this morning with breakfast and meal time insulin not being given with timing of tray delivery.     Steroid use- None.   2 Days Post-Op  Renal function- Abnormal - 4.4 Cr    Vasopressors-  None     Diet diabetic Cardiac (Low Na/Chol); 2000 Calorie; Fluid - 1500mL       Eatin%  Nausea: No  Hypoglycemia and intervention: No  Fever: No  TPN and/or TF: No    BP (!) 149/67 (BP Location: Right arm, Patient Position: Lying)   Pulse 86   Temp 99.2 °F (37.3 °C) (Oral)   Resp 20   Ht 5' 7" (1.702 m)   Wt 78.5 kg (173 lb)   LMP  (LMP Unknown)   SpO2 96%   BMI 27.10 kg/m²     Labs Reviewed and Include    Recent Labs   Lab 23  0410   *   CALCIUM 8.5*   ALBUMIN 2.1*   PROT 5.9*      K 4.0   CO2 24   CL 99   BUN 18   CREATININE 4.4*   ALKPHOS 53*   ALT 6*   AST 10   BILITOT 0.3     Lab Results   Component Value Date    WBC 6.19 2023    HGB 7.8 (L) 2023    HCT 24.7 (L) 2023    MCV 94 2023     (L) 2023     No results for input(s): "TSH", "FREET4" in the last 168 hours.  Lab Results " "  Component Value Date    HGBA1C 8.0 (H) 12/19/2023       Nutritional status:   Body mass index is 27.1 kg/m².  Lab Results   Component Value Date    ALBUMIN 2.1 (L) 12/31/2023    ALBUMIN 2.2 (L) 12/30/2023    ALBUMIN 2.3 (L) 12/30/2023     No results found for: "PREALBUMIN"    Estimated Creatinine Clearance: 15.2 mL/min (A) (based on SCr of 4.4 mg/dL (H)).    Accu-Checks  Recent Labs     12/29/23 2008 12/29/23  2221 12/29/23  2314 12/30/23  1112 12/30/23  1222 12/30/23  1708 12/30/23  1848 12/30/23 2013 12/30/23  2105 12/30/23  2325   POCTGLUCOSE 51* 136* 159* 179* 205* 404* 436* 476* 358* 234*       Current Medications and/or Treatments Impacting Glycemic Control  Immunotherapy:    Immunosuppressants       None          Steroids:   Hormones (From admission, onward)      Start     Stop Route Frequency Ordered    12/29/23 0020  melatonin tablet 3 mg         -- Oral Nightly PRN 12/28/23 2331          Pressors:    Autonomic Drugs (From admission, onward)      None          Hyperglycemia/Diabetes Medications:   Antihyperglycemics (From admission, onward)      Start     Stop Route Frequency Ordered    12/31/23 0900  insulin detemir U-100 (Levemir) pen 14 Units         -- SubQ 2 times daily 12/31/23 0742    12/31/23 0745  insulin aspart U-100 pen 6 Units         -- SubQ 3 times daily with meals 12/31/23 0744    12/30/23 0855  insulin aspart U-100 pen 0-5 Units         -- SubQ Before meals & nightly PRN 12/30/23 0855            ASSESSMENT and PLAN    Renal/  ESRD (end stage renal disease)  Titrate insulin slowly to avoid hypoglycemia as the risk of hypoglycemia increases with decreased creatinine clearance.      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), managed as type 1  BG goal 140-180    - Levemir (Insulin Detemir) 14 units BID (20% increase due to FBG above goal)   - " Novolog (Insulin Aspart) 6 units (0.5 u/kg/day)   - Continue LDC (150/50) prn ac/hs for hyperglycemia given poor renal function   - BG checks AC/HS/0200   - Hypoglycemia protocol in place  - If blood glucose greater than 300, please ask patient not to eat food or drink anything other than water until correctional insulin has brought it back below 250    ** Please notify Endocrine for any change and/or advance in diet**  ** Please call Endocrine for any BG related issues **    Discharge Planning:   TBD. Please notify endocrinology prior to discharge.        Carissa Rosas PA-C  Endocrinology  [facility]

## 2023-12-31 NOTE — PROGRESS NOTES
[facility] - [facility] Stepdown  Cardiac Electrophysiology  Progress Note    Admission Date: 12/28/2023  Code Status: Full Code   Attending Physician: Yamilet Boyce MD   Expected Discharge Date: 1/2/2024  Principal Problem:MRSA bacteremia    Subjective:     Interval History: Stable without changes. Pending JAVY read, but large vegetation on TTE. Device interrogation showed she is not device dependent.    Review of Systems   Constitutional: Negative for diaphoresis and fever.   Cardiovascular:  Negative for chest pain, dyspnea on exertion, leg swelling, near-syncope, orthopnea, palpitations, paroxysmal nocturnal dyspnea and syncope.   Respiratory:  Negative for cough and shortness of breath.    Gastrointestinal:  Negative for abdominal pain, diarrhea, nausea and vomiting.   Neurological:  Negative for light-headedness.   Psychiatric/Behavioral:  Negative for altered mental status and substance abuse.      Objective:     Vital Signs (Most Recent):  Temp: 98 °F (36.7 °C) (12/31/23 0718)  Pulse: 85 (12/31/23 0816)  Resp: 18 (12/31/23 0816)  BP: (!) 152/72 (12/31/23 0718)  SpO2: (!) 93 % (12/31/23 0816) Vital Signs (24h Range):  Temp:  [98 °F (36.7 °C)-99 °F (37.2 °C)] 98 °F (36.7 °C)  Pulse:  [] 85  Resp:  [17-20] 18  SpO2:  [93 %-98 %] 93 %  BP: (151-179)/(68-79) 152/72     Weight: 78.5 kg (173 lb)  Body mass index is 27.1 kg/m².     SpO2: (!) 93 %        Physical Exam  Constitutional:       Appearance: Normal appearance.   Cardiovascular:      Rate and Rhythm: Normal rate and regular rhythm.      Pulses: Normal pulses.      Heart sounds: Normal heart sounds.   Pulmonary:      Effort: Pulmonary effort is normal.      Breath sounds: Normal breath sounds. No wheezing or rales.   Abdominal:      General: Abdomen is flat. There is no distension.      Palpations: Abdomen is soft.      Tenderness: There is no abdominal tenderness.   Musculoskeletal:      Right lower leg: No edema.      Left lower leg: No edema.    Skin:     General: Skin is warm and dry.   Neurological:      Mental Status: She is alert and oriented to person, place, and time. Mental status is at baseline.   Psychiatric:         Mood and Affect: Mood normal.         Behavior: Behavior normal.            Significant Labs: All pertinent lab results from the last 24 hours have been reviewed.    Significant Imaging:  Reviewed  Assessment and Plan:     * MRSA bacteremia  #CRT-D in place  #Infection associated with cardiac device  58yoF with T1DM, ESRD on HD, Cad s/p PCI, iCM with prior EF 25-30% recovered to 55-60% with CRT-D in place (dual-coiled 2018 [employer] CRT-D), severe MR s/p MitraClip, and chronic thrombocytopenia is here as a transfer with bacteremia, vegetation involving her RV lead per outside JAVY. Unable to get good visualization with current imaging. Discussed with echo staff regarding need for further imaging lead extraction.     TTE with 0.7cmx1.8cm vegetation without evidence of TV involvement  JAVY pending read  Bcx MRSA +  HD per groin trialysis  Formal interrogation showed she is not device-dependent    Recommendations:  - Plan for lead extraction early this week  - CTS consulted for backup for procedure        Connor M Gillies, MD  Cardiac Electrophysiology  [facility]

## 2023-12-31 NOTE — PROGRESS NOTES
[facility] - [facility]iology Step[facility]  Infectious Disease  Progress Note    Patient Name: Ellen Roman  MRN: [id number]  Admission Date: 12/28/2023  Length of Stay: 3 days  Attending Physician: Yamilet Boyce MD  Primary Care Provider: Paco Amanda MD    Isolation Status: No active isolations  Assessment/Plan:      ID  * MRSA bacteremia  Secondary to AICD infection.   Management as in AICD infection.  Pending EP management.     Infection associated with cardiac device  AICD lead vegetation with associated bacteremia at the referring hospital. Blood cultures on 12/8 with C perfringes and MRSA. Repeat blood cultures 12/18-27 with MRSA. Patient is afebrile and without leukocytosis. Repeat blood cultures collected in [facility] on 12/29 are still positive, repeat on 12/30 NGTD. JAVY with vegetation per discussion with teams.     Recommendations  Stop cefepime - completed 2 weeks of treatment for C. perfringens  Continue daptomycin.  Start ceftaroline for synergy in the setting of persistently positive blood cultures 2/2 MRSA  Antibiotic lock therapy with vancomycin, if able, for femoral HD catheter  If unable to clear bacteremia post device extraction then may need to consider femoral catheter removal or exchange.  Follow up repeat blood cultures  Of note, patient has voiced not wanting to return to Rehab for antibiotics. Will see if patient is able to receive vancomycin with HD at discharge          Anticipated Disposition: TBD    Thank you for your consult. I will follow-up with patient. Please contact us if you have any additional questions.    Leigh Ann Harris DO  Infectious Disease  [facility]    Subjective:     Principal Problem:MRSA bacteremia    HPI: A 58-year-old woman with DM1, diabetic gastroparesis, diabetic retinopathy, diabetic nephropathy, ESRD on HD (MWF), CAD, HTN, s/p AICD, s/p mitral clip due to severe MR, chronic thrombocytopenia, and recent C tropicalis fungemia with positive HD  "catheter for K pneumoniae (October) who was transferred from [facility] for further management in the setting of refractory MRSA bacteremia and ACID lead vegetation.     Of note, Mrs. Roman was seen in [facility] ED on 12/18 at which time samples were collected for culture and she was subsequently discharged Blood cultures collected on 12/8 became positive for MRSA and C perfringens. She returned to [facility] on 12/18 at which time she was admitted for further management. Cultures collected from 12/18-12/27 are positive for MRSA. During her admission at [facility] she was diagnosed and treated for left IJ DVT, LUE cellulitis, DM1. Work up also revealed evidence of a large vegetation attached to the AICD lead across the TV.     Infectious Diseases consulted for "Bacteremia with concern for ICD lead infection. Transferred on daptomycin and cefepime. "    Interval History:     States she is feeling well, no new complaints. Blood cultures still positive as of 12/29. Planning extraction next week.     Review of Systems   Constitutional:  Negative for chills and fever.   Respiratory:  Negative for cough and shortness of breath.    Cardiovascular:  Negative for chest pain.   Gastrointestinal:  Negative for abdominal pain, constipation, diarrhea, nausea and vomiting.   Musculoskeletal:  Negative for arthralgias and myalgias.   Skin:  Negative for rash.   Neurological:  Negative for headaches.     Objective:     Vital Signs (Most Recent):  Temp: 99.2 °F (37.3 °C) (12/31/23 1116)  Pulse: 85 (12/31/23 1158)  Resp: 18 (12/31/23 1158)  BP: (!) 149/67 (12/31/23 1116)  SpO2: 97 % (12/31/23 1158) Vital Signs (24h Range):  Temp:  [98 °F (36.7 °C)-99.2 °F (37.3 °C)] 99.2 °F (37.3 °C)  Pulse:  [] 85  Resp:  [18-20] 18  SpO2:  [93 %-98 %] 97 %  BP: (149-179)/(67-79) 149/67     Weight: 78.5 kg (173 lb)  Body mass index is 27.1 kg/m².    Estimated Creatinine Clearance: 15.2 mL/min (A) (based on SCr of 4.4 mg/dL (H)).     Physical " Exam  Vitals reviewed.   Constitutional:       General: She is not in acute distress.     Appearance: Normal appearance. She is not ill-appearing.   HENT:      Head: Normocephalic and atraumatic.   Eyes:      Extraocular Movements: Extraocular movements intact.      Conjunctiva/sclera: Conjunctivae normal.   Cardiovascular:      Rate and Rhythm: Normal rate and regular rhythm.      Heart sounds: Murmur heard.   Pulmonary:      Effort: Pulmonary effort is normal. No respiratory distress.      Breath sounds: Normal breath sounds. No wheezing.   Abdominal:      General: Abdomen is flat. Bowel sounds are normal.      Palpations: Abdomen is soft.      Tenderness: There is no abdominal tenderness.   Musculoskeletal:      Cervical back: Normal range of motion.   Skin:     General: Skin is warm and dry.   Neurological:      Mental Status: She is alert and oriented to person, place, and time.   Psychiatric:         Mood and Affect: Mood normal.         Behavior: Behavior normal.         Thought Content: Thought content normal.          Significant Labs: All pertinent labs within the past 24 hours have been reviewed.  Recent Lab Results  (Last 5 results in the past 24 hours)        12/31/23  0410   12/30/23  2325   12/30/23  2105   12/30/23 2013 12/30/23  1848        Albumin 2.1               ALP 53               ALT 6               Anion Gap 14               aPTT 33.0  Comment: Refer to local heparin nomogram for intensity/dose specific   therapeutic   range.                 AST 10               Baso # 0.03               Basophil % 0.5               Beta-Hydroxybutyrate               BILIRUBIN TOTAL 0.3  Comment: For infants and newborns, interpretation of results should be based  on gestational age, weight and in agreement with clinical  observations.    Premature Infant recommended reference ranges:  Up to 24 hours.............<8.0 mg/dL  Up to 48 hours............<12.0 mg/dL  3-5 days..................<15.0 mg/dL  6-29  days.................<15.0 mg/dL                 BUN 18               Calcium 8.5               Chloride 99               CO2 24               Creatinine 4.4               Differential Method Automated               eGFR 11.1               Eos # 0.2               Eosinophil % 2.4               Glucose 221               Gran # (ANC) 4.6               Gran % 74.5               Hematocrit 24.7               Hemoglobin 7.8               Immature Grans (Abs) 0.05  Comment: Mild elevation in immature granulocytes is non specific and   can be seen in a variety of conditions including stress response,   acute inflammation, trauma and pregnancy. Correlation with other   laboratory and clinical findings is essential.                 Immature Granulocytes 0.8               Lymph # 0.8               Lymph % 12.9               Magnesium  1.8               MCH 29.8               MCHC 31.6               MCV 94               Mono # 0.6               Mono % 8.9               MPV 11.4               nRBC 0               Platelet Count 149               POCT Glucose   234   358   476   436       Potassium 4.0               PROTEIN TOTAL 5.9               RBC 2.62               RDW 15.8               Sodium 137               WBC 6.19                                      Significant Imaging: I have reviewed all pertinent imaging results/findings within the past 24 hours.

## 2023-12-31 NOTE — ASSESSMENT & PLAN NOTE
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 Blood cultures on 12/8 with C perfringes and MRSA. Repeat blood cultures 12/18-27 with MRSA. Patient is afebrile and without leukocytosis. Repeat blood cultures collected in [facility] on 12/29 are still positive, repeat on 12/30 NGTD. JAVY with vegetation per discussion with teams.     Recommendations  Stop cefepime - completed 2 weeks of treatment for C. perfringens  Continue daptomycin.  Start ceftaroline for synergy in the setting of persistently positive blood cultures 2/2 MRSA  Antibiotic lock therapy with vancomycin, if able, for femoral HD catheter  If unable to clear bacteremia post device extraction then may need to consider femoral catheter removal or exchange.  Follow up repeat blood cultures  Of note, patient has voiced not wanting to return to Rehab for antibiotics. Will see if patient is able to receive vancomycin with HD at discharge

## 2023-12-31 NOTE — SUBJECTIVE & OBJECTIVE
Interval History: Stable without changes. Pending JAVY read, but large vegetation on TTE. Device interrogation showed she is not device dependent.    Review of Systems   Constitutional: Negative for diaphoresis and fever.   Cardiovascular:  Negative for chest pain, dyspnea on exertion, leg swelling, near-syncope, orthopnea, palpitations, paroxysmal nocturnal dyspnea and syncope.   Respiratory:  Negative for cough and shortness of breath.    Gastrointestinal:  Negative for abdominal pain, diarrhea, nausea and vomiting.   Neurological:  Negative for light-headedness.   Psychiatric/Behavioral:  Negative for altered mental status and substance abuse.      Objective:     Vital Signs (Most Recent):  Temp: 98 °F (36.7 °C) (12/31/23 0718)  Pulse: 85 (12/31/23 0816)  Resp: 18 (12/31/23 0816)  BP: (!) 152/72 (12/31/23 0718)  SpO2: (!) 93 % (12/31/23 0816) Vital Signs (24h Range):  Temp:  [98 °F (36.7 °C)-99 °F (37.2 °C)] 98 °F (36.7 °C)  Pulse:  [] 85  Resp:  [17-20] 18  SpO2:  [93 %-98 %] 93 %  BP: (151-179)/(68-79) 152/72     Weight: 78.5 kg (173 lb)  Body mass index is 27.1 kg/m².     SpO2: (!) 93 %        Physical Exam  Constitutional:       Appearance: Normal appearance.   Cardiovascular:      Rate and Rhythm: Normal rate and regular rhythm.      Pulses: Normal pulses.      Heart sounds: Normal heart sounds.   Pulmonary:      Effort: Pulmonary effort is normal.      Breath sounds: Normal breath sounds. No wheezing or rales.   Abdominal:      General: Abdomen is flat. There is no distension.      Palpations: Abdomen is soft.      Tenderness: There is no abdominal tenderness.   Musculoskeletal:      Right lower leg: No edema.      Left lower leg: No edema.   Skin:     General: Skin is warm and dry.   Neurological:      Mental Status: She is alert and oriented to person, place, and time. Mental status is at baseline.   Psychiatric:         Mood and Affect: Mood normal.         Behavior: Behavior normal.             Significant Labs: All pertinent lab results from the last 24 hours have been reviewed.    Significant Imaging:  Reviewed

## 2023-12-31 NOTE — ASSESSMENT & PLAN NOTE
BG goal 140-180    - Levemir (Insulin Detemir) 14 units BID (20% increase due to FBG above goal)   - Novolog (Insulin Aspart) 6 units (0.5 u/kg/day)   - Continue LDC (150/50) prn ac/hs for hyperglycemia given poor renal function   - BG checks AC/HS/0200   - Hypoglycemia protocol in place  - If blood glucose greater than 300, please ask patient not to eat food or drink anything other than water until correctional insulin has brought it back below 250    ** Please notify Endocrine for any change and/or advance in diet**  ** Please call Endocrine for any BG related issues **    Discharge Planning:   TBD. Please notify endocrinology prior to discharge.

## 2023-12-31 NOTE — PLAN OF CARE
Problem: Adult Inpatient Plan of Care  Goal: Plan of Care Review  Outcome: Ongoing, Progressing  Goal: Patient-Specific Goal (Individualized)  Outcome: Ongoing, Progressing  Goal: Optimal Comfort and Wellbeing  Outcome: Ongoing, Progressing  Goal: Readiness for Transition of Care  Outcome: Ongoing, Progressing     Problem: Diabetes Comorbidity  Goal: Blood Glucose Level Within Targeted Range  Outcome: Ongoing, Progressing     Problem: Fluid and Electrolyte Imbalance (Acute Kidney Injury/Impairment)  Goal: Fluid and Electrolyte Balance  Outcome: Ongoing, Progressing     Problem: Oral Intake Inadequate (Acute Kidney Injury/Impairment)  Goal: Optimal Nutrition Intake  Outcome: Ongoing, Progressing     Problem: Renal Function Impairment (Acute Kidney Injury/Impairment)  Goal: Effective Renal Function  Outcome: Ongoing, Progressing     Problem: Device-Related Complication Risk (Hemodialysis)  Goal: Safe, Effective Therapy Delivery  Outcome: Ongoing, Progressing     Problem: Hemodynamic Instability (Hemodialysis)  Goal: Effective Tissue Perfusion  Outcome: Ongoing, Progressing     Problem: Infection (Hemodialysis)  Goal: Absence of Infection Signs and Symptoms  Outcome: Ongoing, Progressing

## 2023-12-31 NOTE — ASSESSMENT & PLAN NOTE
- MRSA on OSH Bcx since 12/18. On daptomycin 6mg/kg Q48hr and cefepime at OSH  - Trialysis line removed due to concern for source  - 12/27 JAVY concern for vegetation reported  - ID consulted- continue dapto 10mg/kg- continue cefepime w/h/o c perfringens   - Bcx 12/29- 2/4 +iv MRSA  - Bcx 12/30- NGTD  - received vanc antibiotic lock yesterday, 12/30  - EP consulted- AJVY performed w/RV lead vegetation- will need to undergo extraction and then possible re-implantation of leadless pacer after clearance of blood cultures -CTS consulted as well

## 2023-12-31 NOTE — PROGRESS NOTES
[facility] Medicine  Progress Note    Patient Name: Ellen Roman  MRN: [id number]  Patient Class: IP- Inpatient   Admission Date: 12/28/2023  Length of Stay: 3 days  Attending Physician: Yamilet Boyce MD  Primary Care Provider: Paco Amanda MD        Subjective:     Principal Problem:MRSA bacteremia        HPI:  Ellen Roman is 58 year old female with PMHx significant for T1DM c/b gastroparesis, retinopathy and nephropathy - ESRD on HD, HTN, HLD, CAD s/p prior PCI with ischemic cardiomyopathy s/p AICD placement, chronic thrombocytopenia, severe mitral regurgitation status post MitraClip, history of fungemia/bacteremia initially presenting to [facility] ER on 12/18 with a complaints of right-sided chest pain associated with nausea and vomiting multiple episodes for the prior 2 days.     She also reported low-grade temperature. Patient reports generalized weakness. Cardiology performed LHC on 12/20/2023 for evaluation of this chest pain with recommendation for medical management for coronary artery disease. Course is then complicated by refractory MRSA bacteremia. HM team initially suspected source of bacteremia was a tunneled dialysis catheter. This was removed upon admission and patient had placement of left IJ. The placement of left IJ trialysis catheter was c/b IJ thrombosis for which she is started on provoked DVT management - currently with lovenox though with ESRD would consider transition to heparin. IR had difficulty placing the right IJ due to thrombosis as well and access subsequently was placed on the right femoral. Due to persistent bacteremia despite vancomycin, patient subsequently had JAVY 12/27/23 with findings reported as positive for vegetations present on the ICD lead wire. As per OS cardiologist, requested transfer from [facility] to [facility]. 2018 CRT-D [employer].     Current medications:  aspirin 81 mg Oral Daily   atorvastatin 40 mg Oral Nightly   cefepime 1 g  Intravenous Q24H   DAPTOmycin 6 mg/kg Intravenous Q48H since 12/26  enoxaparin 1 mg/kg (Ideal) Subcutaneous Nightly   epoetin john 100 Units/kg Subcutaneous Once per day on Tuesday Thursday Saturday   hydrALAZINE 25 mg Oral TID   insulin glargine 10 Units Subcutaneous Nightly   insulin lispro 0-10 Units Subcutaneous AC & HS   ipratropium-albuteroL 3 mL Nebulization Q6H   metoprolol succinate XL 50 mg Oral Daily   pantoprazole 40 mg Oral QAM   pregabalin 50 mg Oral Daily since 12/27  rifAMPin 300 mg Oral Daily   sacubitriL-valsartan 1 tablet Oral BID   vancomycin 15 mg/kg Intravenous since 12/18    Overview/Hospital Course:  ID, Nephro, EP consulted on admission. JAVY performed 12/29. TTE 12/29:       Left Ventricle: The left ventricle is normal in size. Increased ventricular mass. Increased wall thickness. There is concentric hypertrophy. Normal wall motion. There is normal systolic function with a visually estimated ejection fraction of 60 - 65%. Grade I diastolic dysfunction.    Right Ventricle: Normal right ventricular cavity size. Systolic function is normal. Pacemaker lead present in the ventricle with a 0.7 x 1.8 cm mobile, echogenic structure consistent with a vegetation.    Left Atrium: Left atrium is moderately dilated.    Right Atrium: Right atrium is mildly dilated.    Mitral Valve: The mitral valve is repaired by MitraClip. There is mild regurgitation.    Tricuspid Valve: The tricuspid valve is structurally normal. There is moderate regurgitation. The vegetation on the ventricular lead appears adjacent to but not adherent to the septal leaflet.    Pulmonary Artery: There is pulmonary hypertension. The estimated pulmonary artery systolic pressure is 76 mmHg.    IVC/SVC: Elevated venous pressure at 15 mmHg.    Pericardium: There is a trivial posterior effusion. No indication of cardiac tamponade.    ID changed antibiotics to cefepime and dapto. Endocrine consulted for diabetes management. Nephro consulted  for HD needs. Lasix increased to 80mg qday. Patient was continued on heparin gtt for thrombosed IJ. JAVY w/RV pacer lead w/0.7 x 1.8 cm mobile, echogenic structure consistent with a vegetation. Did a vanc lock for the Rt fem CVL 12/30. Repeat blood cultures obtained 12/30 due to Bcx 12/29 +iv for MRSA. Had issues with hyperglycemia- insulin regimen modified.     Interval History: No complaints this am. Hyperglycemia last PM- associated nausea- improved this am. PRN insulin regular administered last PM. No findings of DKA. No sob, chest pain, n/v, fevers, chills, night sweats, abd pain, diarrhea, constipation.    Objective:     Vital Signs (Most Recent):  Temp: 98.3 °F (36.8 °C) (12/31/23 1458)  Pulse: 92 (12/31/23 1508)  Resp: 18 (12/31/23 1458)  BP: 130/60 (12/31/23 1458)  SpO2: (!) 93 % (12/31/23 1458) Vital Signs (24h Range):  Temp:  [98 °F (36.7 °C)-99.2 °F (37.3 °C)] 98.3 °F (36.8 °C)  Pulse:  [] 92  Resp:  [18-20] 18  SpO2:  [93 %-97 %] 93 %  BP: (130-179)/(60-79) 130/60     Weight: 78.5 kg (173 lb)  Body mass index is 27.1 kg/m².  No intake or output data in the 24 hours ending 12/31/23 1513        Physical Exam  Gen: in NAD, appears stated age  Neuro: AAOx3, motor, sensory, and strength grossly intact BL  HEENT: NTNC, EOMI, PERRL, MMM, nodular presence of the Rt IJ area  CVS: RRR, no m/r/g; S1/S2 auscultated with no S3 or S4; capillary refill < 2 sec  Resp: lungs CTAB, no w/r/r; no belabored breathing or accessory muscle use appreciated   Abd: BS+ in all 4 quadrants; NTND, soft to palpation; no organomegaly appreciated   Extrem: pulses full, equal, and regular over all 4 extremities; no UE edema, BL LE edema, Rt fem CVL    Significant Labs: All pertinent labs within the past 24 hours have been reviewed.  Blood Culture:   Recent Labs   Lab 12/30/23  1739 12/30/23  1741   LABBLOO No Growth to date No Growth to date     CBC:   Recent Labs   Lab 12/30/23  0502 12/31/23  0410   WBC 5.01 6.19   HGB 7.8* 7.8*  "  HCT 24.8* 24.7*   * 149*     CMP:   Recent Labs   Lab 12/30/23  0705 12/30/23  1455 12/30/23  1834 12/31/23  0410   * 132* 132* 137   K 4.8 3.9 4.0 4.0   CL 96 95 98 99   CO2 18* 24 22* 24   * 345* 441* 221*   BUN 28* 12 15 18   CREATININE 5.9* 3.6* 3.8* 4.4*   CALCIUM 8.6* 8.2* 8.1* 8.5*   PROT 6.7  --  6.2 5.9*   ALBUMIN 2.3*  --  2.2* 2.1*   BILITOT 0.4  --  0.3 0.3   ALKPHOS 64  --  59 53*   AST 15  --  10 10   ALT 8*  --  6* 6*   ANIONGAP 15 13 12 14     Respiratory Culture: No results for input(s): "GSRESP", "RESPIRATORYC" in the last 48 hours.  Troponin: No results for input(s): "TROPONINI", "TROPONINIHS" in the last 48 hours.    Significant Imaging: I have reviewed all pertinent imaging results/findings within the past 24 hours.    JAVY:     Left Ventricle: The left ventricle is normal in size. Increased ventricular mass. Increased wall thickness. There is concentric hypertrophy. Normal wall motion. There is normal systolic function with a visually estimated ejection fraction of 60 - 65%. Grade I diastolic dysfunction.    Right Ventricle: Normal right ventricular cavity size. Systolic function is normal. Pacemaker lead present in the ventricle with a 0.7 x 1.8 cm mobile, echogenic structure consistent with a vegetation.    Left Atrium: Left atrium is moderately dilated.    Right Atrium: Right atrium is mildly dilated.    Mitral Valve: The mitral valve is repaired by MitraClip. There is mild regurgitation.    Tricuspid Valve: The tricuspid valve is structurally normal. There is moderate regurgitation. The vegetation on the ventricular lead appears adjacent to but not adherent to the septal leaflet.    Pulmonary Artery: There is pulmonary hypertension. The estimated pulmonary artery systolic pressure is 76 mmHg.    IVC/SVC: Elevated venous pressure at 15 mmHg.    Pericardium: There is a trivial posterior effusion. No indication of cardiac tamponade.    Assessment/Plan:      * MRSA " bacteremia  - MRSA on OSH Bcx since 12/18. 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  Last A1c reviewed-   Lab Results   Component Value Date    HGBA1C 8.0 (H) 12/19/2023     Most recent fingerstick glucose reviewed-   Recent Labs   Lab 12/30/23 2013 12/30/23  2105 12/30/23  2325 12/31/23  1319   POCTGLUCOSE 476* 358* 234* 163*       Current correctional scale  Medium  Maintain anti-hyperglycemic dose as follows-   Antihyperglycemics (From admission, onward)      Start     Stop Route Frequency Ordered    12/31/23 1200  insulin aspart U-100 pen 0-5 Units         -- SubQ Before meals, nightly and at 0200 PRN 12/31/23 1151    12/31/23 0900  insulin detemir U-100 (Levemir) pen 14 Units         -- SubQ 2 times daily 12/31/23 0742    12/31/23 0745  insulin aspart U-100 pen 6 Units         -- SubQ 3 times daily with meals 12/31/23 0744          Hold Oral hypoglycemics while patient is in the hospital.    Infection associated with cardiac device  - see MRSA bacteremia       Chronic kidney disease-mineral and bone disorder  Creatine stable for now. BMP reviewed- noted Estimated Creatinine Clearance: 15.2 mL/min (A) (based on SCr of 4.4 mg/dL (H)). according to latest data. Based on current GFR, CKD stage is end stage.  Monitor UOP and serial BMP and adjust therapy as needed. Renally dose meds. Avoid nephrotoxic medications and  procedures.    - continue calcium acetate    Anemia due to stage 5 chronic kidney disease, not on chronic dialysis  Patient's anemia is currently uncontrolled. Has not received any PRBCs to date. Etiology likely d/t chronic disease due to Chronic Kidney Disease/ESRD  Current CBC reviewed-   Lab Results   Component Value Date    HGB 7.8 (L) 12/31/2023    HCT 24.7 (L) 12/31/2023     Monitor serial CBC and transfuse if patient becomes hemodynamically unstable, symptomatic or H/H drops below 7/21.  - continue epo    ESRD (end stage renal disease)  - ESRD with trialysis line removed.   - Nephrology consulted- Rt fem CVL- will discuss with nephro and ID about more permanent HD needs- will need clearance of blood cultures and ICD removal     Cardiac resynchronization therapy defibrillator (CRT-D) in place  - [employer] CRT-D implanted 2018      Nonrheumatic mitral valve regurgitation  - S/p mitraclip       Coronary artery disease involving native coronary artery of native heart without angina pectoris  - S/P PCI D2 with SHU x 1 (07/13/2016)  - S/P PCI OM1 with SHU x 1 (01/04/2017)  - S/P PCI prox D2 with SHU x 1 (03/07/2017)  - Patent stents with NCCAD by [facility] 12/10/2019  - S/P C with RCA 90 % stenosis, SHU x 3 (4/5/2022)    Most recent C during admission. Continue aspirin, heparin drip.      VTE Risk Mitigation (From admission, onward)           Ordered     vancomycin (VANCOCIN) 4 mg, heparin (porcine) 5,000 Units IV catheter lock (total volume 2 mL)  Once         12/30/23 1828     heparin (porcine) injection 1,000 Units  As needed (PRN)         12/30/23 1102     heparin 25,000 units in dextrose 5% (100 units/ml) IV bolus from bag - ADDITIONAL PRN BOLUS - 60 units/kg  As needed (PRN)        Question:  Heparin Infusion Adjustment (DO NOT MODIFY ANSWER)  Answer:  [email or web address]\Images\Pharmacy\HeparinInfusions\heparin HIGH INTENSITY nomogram for [facility] [id number]    12/29/23 0901     heparin 25,000 units in  dextrose 5% (100 units/ml) IV bolus from bag - ADDITIONAL PRN BOLUS - 30 units/kg  As needed (PRN)        Question:  Heparin Infusion Adjustment (DO NOT MODIFY ANSWER)  Answer:  [email or web address]\Images\Pharmacy\HeparinInfusions\heparin HIGH INTENSITY nomogram for [facility] [id number]    12/29/23 0901     heparin 25,000 units in dextrose 5% 250 mL (100 units/mL) infusion HIGH INTENSITY nomogram - [facility]  Continuous        Question:  Begin at (units/kg/hr)  Answer:  18    12/29/23 0901     heparin (porcine) injection 3,000 Units  Daily PRN         12/28/23 3381                    Discharge Planning   VIRA: 1/2/2024     Code Status: Full Code   Is the patient medically ready for discharge?: No    Reason for patient still in hospital (select all that apply): Patient trending condition  Discharge Plan A: Home with family, Home Health                    Yamilet Boyec MD  Department of Hospital Medicine   [facility] - Cardiology Stepdown

## 2023-12-31 NOTE — SUBJECTIVE & OBJECTIVE
Interval History:     States she is feeling well, no new complaints. Blood cultures still positive as of 12/29. Planning extraction next week.     Review of Systems   Constitutional:  Negative for chills and fever.   Respiratory:  Negative for cough and shortness of breath.    Cardiovascular:  Negative for chest pain.   Gastrointestinal:  Negative for abdominal pain, constipation, diarrhea, nausea and vomiting.   Musculoskeletal:  Negative for arthralgias and myalgias.   Skin:  Negative for rash.   Neurological:  Negative for headaches.     Objective:     Vital Signs (Most Recent):  Temp: 99.2 °F (37.3 °C) (12/31/23 1116)  Pulse: 85 (12/31/23 1158)  Resp: 18 (12/31/23 1158)  BP: (!) 149/67 (12/31/23 1116)  SpO2: 97 % (12/31/23 1158) Vital Signs (24h Range):  Temp:  [98 °F (36.7 °C)-99.2 °F (37.3 °C)] 99.2 °F (37.3 °C)  Pulse:  [] 85  Resp:  [18-20] 18  SpO2:  [93 %-98 %] 97 %  BP: (149-179)/(67-79) 149/67     Weight: 78.5 kg (173 lb)  Body mass index is 27.1 kg/m².    Estimated Creatinine Clearance: 15.2 mL/min (A) (based on SCr of 4.4 mg/dL (H)).     Physical Exam  Vitals reviewed.   Constitutional:       General: She is not in acute distress.     Appearance: Normal appearance. She is not ill-appearing.   HENT:      Head: Normocephalic and atraumatic.   Eyes:      Extraocular Movements: Extraocular movements intact.      Conjunctiva/sclera: Conjunctivae normal.   Cardiovascular:      Rate and Rhythm: Normal rate and regular rhythm.      Heart sounds: Murmur heard.   Pulmonary:      Effort: Pulmonary effort is normal. No respiratory distress.      Breath sounds: Normal breath sounds. No wheezing.   Abdominal:      General: Abdomen is flat. Bowel sounds are normal.      Palpations: Abdomen is soft.      Tenderness: There is no abdominal tenderness.   Musculoskeletal:      Cervical back: Normal range of motion.   Skin:     General: Skin is warm and dry.   Neurological:      Mental Status: She is alert and  oriented to person, place, and time.   Psychiatric:         Mood and Affect: Mood normal.         Behavior: Behavior normal.         Thought Content: Thought content normal.          Significant Labs: All pertinent labs within the past 24 hours have been reviewed.  Recent Lab Results  (Last 5 results in the past 24 hours)        12/31/23  0410   12/30/23  2325   12/30/23  2105   12/30/23 2013 12/30/23  1848        Albumin 2.1               ALP 53               ALT 6               Anion Gap 14               aPTT 33.0  Comment: Refer to local heparin nomogram for intensity/dose specific   therapeutic   range.                 AST 10               Baso # 0.03               Basophil % 0.5               Beta-Hydroxybutyrate               BILIRUBIN TOTAL 0.3  Comment: For infants and newborns, interpretation of results should be based  on gestational age, weight and in agreement with clinical  observations.    Premature Infant recommended reference ranges:  Up to 24 hours.............<8.0 mg/dL  Up to 48 hours............<12.0 mg/dL  3-5 days..................<15.0 mg/dL  6-29 days.................<15.0 mg/dL                 BUN 18               Calcium 8.5               Chloride 99               CO2 24               Creatinine 4.4               Differential Method Automated               eGFR 11.1               Eos # 0.2               Eosinophil % 2.4               Glucose 221               Gran # (ANC) 4.6               Gran % 74.5               Hematocrit 24.7               Hemoglobin 7.8               Immature Grans (Abs) 0.05  Comment: Mild elevation in immature granulocytes is non specific and   can be seen in a variety of conditions including stress response,   acute inflammation, trauma and pregnancy. Correlation with other   laboratory and clinical findings is essential.                 Immature Granulocytes 0.8               Lymph # 0.8               Lymph % 12.9               Magnesium  1.8               MCH  29.8               MCHC 31.6               MCV 94               Mono # 0.6               Mono % 8.9               MPV 11.4               nRBC 0               Platelet Count 149               POCT Glucose   234   358   476   436       Potassium 4.0               PROTEIN TOTAL 5.9               RBC 2.62               RDW 15.8               Sodium 137               WBC 6.19                                      Significant Imaging: I have reviewed all pertinent imaging results/findings within the past 24 hours.

## 2023-12-31 NOTE — ASSESSMENT & PLAN NOTE
Creatine stable for now. BMP reviewed- noted Estimated Creatinine Clearance: 15.2 mL/min (A) (based on SCr of 4.4 mg/dL (H)). according to latest data. Based on current GFR, CKD stage is end stage.  Monitor UOP and serial BMP and adjust therapy as needed. Renally dose meds. Avoid nephrotoxic medications and procedures.    - continue calcium acetate

## 2023-12-31 NOTE — SUBJECTIVE & OBJECTIVE
"Interval HPI:   Significant BG excursions >400s noted overnight, with N/V. Negative DKA workup. pt attributes N/V to eating 'bad jambalaya' around 1:45 PM after dialysis yesterday, states whenever she vomits her BG goes up. However, unclear if BG excursions related to acute stress vs. Reduction of prandial insulin doses vs. Not receiving SSI with meal time dose/not receiving meal time insulin (states she ate at 1:45 PM and had nothing to eat after however scheduled novolog given at 4:45 PM).     Patient in room 322/322 A. Blood glucose improving. BG above goal on current insulin regimen (SSI, prandial, and basal insulin ). Steroid use- None.   2 Days Post-Op  Renal function- Abnormal - 4.4 Cr    Vasopressors-  None     Diet diabetic Cardiac (Low Na/Chol); 2000 Calorie; Fluid - 1500mL       Eatin%  Nausea: No  Hypoglycemia and intervention: No  Fever: No  TPN and/or TF: No    BP (!) 149/67 (BP Location: Right arm, Patient Position: Lying)   Pulse 86   Temp 99.2 °F (37.3 °C) (Oral)   Resp 20   Ht 5' 7" (1.702 m)   Wt 78.5 kg (173 lb)   LMP  (LMP Unknown)   SpO2 96%   BMI 27.10 kg/m²     Labs Reviewed and Include    Recent Labs   Lab 23  0410   *   CALCIUM 8.5*   ALBUMIN 2.1*   PROT 5.9*      K 4.0   CO2 24   CL 99   BUN 18   CREATININE 4.4*   ALKPHOS 53*   ALT 6*   AST 10   BILITOT 0.3     Lab Results   Component Value Date    WBC 6.19 2023    HGB 7.8 (L) 2023    HCT 24.7 (L) 2023    MCV 94 2023     (L) 2023     No results for input(s): "TSH", "FREET4" in the last 168 hours.  Lab Results   Component Value Date    HGBA1C 8.0 (H) 2023       Nutritional status:   Body mass index is 27.1 kg/m².  Lab Results   Component Value Date    ALBUMIN 2.1 (L) 2023    ALBUMIN 2.2 (L) 2023    ALBUMIN 2.3 (L) 2023     No results found for: "PREALBUMIN"    Estimated Creatinine Clearance: 15.2 mL/min (A) (based on SCr of 4.4 mg/dL " (H)).    Accu-Checks  Recent Labs     12/29/23  2008 12/29/23  2221 12/29/23  2314 12/30/23  1112 12/30/23  1222 12/30/23  1708 12/30/23  1848 12/30/23 2013 12/30/23  2105 12/30/23  2325   POCTGLUCOSE 51* 136* 159* 179* 205* 404* 436* 476* 358* 234*       Current Medications and/or Treatments Impacting Glycemic Control  Immunotherapy:    Immunosuppressants       None          Steroids:   Hormones (From admission, onward)      Start     Stop Route Frequency Ordered    12/29/23 0020  melatonin tablet 3 mg         -- Oral Nightly PRN 12/28/23 2331          Pressors:    Autonomic Drugs (From admission, onward)      None          Hyperglycemia/Diabetes Medications:   Antihyperglycemics (From admission, onward)      Start     Stop Route Frequency Ordered    12/31/23 0900  insulin detemir U-100 (Levemir) pen 14 Units         -- SubQ 2 times daily 12/31/23 0742    12/31/23 0745  insulin aspart U-100 pen 6 Units         -- SubQ 3 times daily with meals 12/31/23 0744    12/30/23 0855  insulin aspart U-100 pen 0-5 Units         -- SubQ Before meals & nightly PRN 12/30/23 0855

## 2023-12-31 NOTE — ASSESSMENT & PLAN NOTE
- S/P PCI D2 with SHU x 1 (07/13/2016)  - S/P PCI OM1 with SHU x 1 (01/04/2017)  - S/P PCI prox D2 with SHU x 1 (03/07/2017)  - Patent stents with NCCAD by [facility] 12/10/2019  - S/P [facility] with RCA 90 % stenosis, SHU x 3 (4/5/2022)    Most recent C during admission. Continue aspirin, heparin drip.

## 2023-12-31 NOTE — ASSESSMENT & PLAN NOTE
Patient's anemia is currently uncontrolled. Has not received any PRBCs to date. Etiology likely d/t chronic disease due to Chronic Kidney Disease/ESRD  Current CBC reviewed-   Lab Results   Component Value Date    HGB 7.8 (L) 12/31/2023    HCT 24.7 (L) 12/31/2023     Monitor serial CBC and transfuse if patient becomes hemodynamically unstable, symptomatic or H/H drops below 7/21.  - continue epo

## 2023-12-31 NOTE — ASSESSMENT & PLAN NOTE
#CRT-D in place  #Infection associated with cardiac device  58yoF with T1DM, ESRD on HD, Cad s/p PCI, iCM with prior EF 25-30% recovered to 55-60% with CRT-D in place (dual-coiled 2018 [employer] CRT-D), severe MR s/p MitraClip, and chronic thrombocytopenia is here as a transfer with bacteremia, vegetation involving her RV lead per outside JAVY. Unable to get good visualization with current imaging. Discussed with echo staff regarding need for further imaging lead extraction.     TTE with 0.7cmx1.8cm vegetation without evidence of TV involvement  JAVY pending read  Bcx MRSA +  HD per groin trialysis  Formal interrogation showed she is not device-dependent    Recommendations:  - Plan for lead extraction early this week  - CTS consulted for backup for procedure

## 2024-01-01 ENCOUNTER — ANESTHESIA EVENT (OUTPATIENT)
Dept: MEDSURG UNIT | Facility: HOSPITAL | Age: 59
DRG: 260 | End: 2024-01-01
Payer: MEDICARE

## 2024-01-01 ENCOUNTER — ANESTHESIA (OUTPATIENT)
Dept: MEDSURG UNIT | Facility: HOSPITAL | Age: 59
DRG: 260 | End: 2024-01-01
Payer: MEDICARE

## 2024-01-01 VITALS
HEIGHT: 67 IN | OXYGEN SATURATION: 98 % | SYSTOLIC BLOOD PRESSURE: 134 MMHG | TEMPERATURE: 99 F | DIASTOLIC BLOOD PRESSURE: 66 MMHG | WEIGHT: 172 LBS | RESPIRATION RATE: 18 BRPM | HEART RATE: 86 BPM | BODY MASS INDEX: 27 KG/M2

## 2024-01-01 LAB
ABO + RH BLD: NORMAL
ALBUMIN SERPL BCP-MCNC: 2.2 G/DL (ref 3.5–5.2)
ALBUMIN SERPL BCP-MCNC: 2.2 G/DL (ref 3.5–5.2)
ALBUMIN SERPL BCP-MCNC: 2.4 G/DL (ref 3.5–5.2)
ALBUMIN SERPL BCP-MCNC: 2.4 G/DL (ref 3.5–5.2)
ALP SERPL-CCNC: 57 U/L (ref 55–135)
ALP SERPL-CCNC: 59 U/L (ref 55–135)
ALP SERPL-CCNC: 59 U/L (ref 55–135)
ALT SERPL W/O P-5'-P-CCNC: 7 U/L (ref 10–44)
ALT SERPL W/O P-5'-P-CCNC: 9 U/L (ref 10–44)
ALT SERPL W/O P-5'-P-CCNC: 9 U/L (ref 10–44)
ANION GAP SERPL CALC-SCNC: 13 MMOL/L (ref 8–16)
ANION GAP SERPL CALC-SCNC: 13 MMOL/L (ref 8–16)
ANION GAP SERPL CALC-SCNC: 14 MMOL/L (ref 8–16)
ANION GAP SERPL CALC-SCNC: 8 MMOL/L (ref 8–16)
APTT PPP: 27.6 SEC (ref 21–32)
APTT PPP: 35.4 SEC (ref 21–32)
APTT PPP: 39.3 SEC (ref 21–32)
APTT PPP: 39.4 SEC (ref 21–32)
APTT PPP: 40.4 SEC (ref 21–32)
APTT PPP: 54.4 SEC (ref 21–32)
AST SERPL-CCNC: 10 U/L (ref 10–40)
AST SERPL-CCNC: 11 U/L (ref 10–40)
AST SERPL-CCNC: 12 U/L (ref 10–40)
BACTERIA BLD CULT: ABNORMAL
BASOPHILS # BLD AUTO: 0.02 K/UL (ref 0–0.2)
BASOPHILS # BLD AUTO: 0.04 K/UL (ref 0–0.2)
BASOPHILS # BLD AUTO: 0.04 K/UL (ref 0–0.2)
BASOPHILS NFR BLD: 0.3 % (ref 0–1.9)
BASOPHILS NFR BLD: 0.4 % (ref 0–1.9)
BASOPHILS NFR BLD: 0.6 % (ref 0–1.9)
BILIRUB SERPL-MCNC: 0.3 MG/DL (ref 0.1–1)
BILIRUB SERPL-MCNC: 0.3 MG/DL (ref 0.1–1)
BILIRUB SERPL-MCNC: 0.4 MG/DL (ref 0.1–1)
BLD GP AB SCN CELLS X3 SERPL QL: NORMAL
BLD PROD TYP BPU: NORMAL
BLOOD UNIT EXPIRATION DATE: NORMAL
BLOOD UNIT TYPE CODE: 6200
BLOOD UNIT TYPE: NORMAL
BUN SERPL-MCNC: 20 MG/DL (ref 6–20)
BUN SERPL-MCNC: 28 MG/DL (ref 6–20)
BUN SERPL-MCNC: 34 MG/DL (ref 6–20)
BUN SERPL-MCNC: 9 MG/DL (ref 6–20)
CALCIUM SERPL-MCNC: 8.4 MG/DL (ref 8.7–10.5)
CALCIUM SERPL-MCNC: 8.4 MG/DL (ref 8.7–10.5)
CALCIUM SERPL-MCNC: 8.5 MG/DL (ref 8.7–10.5)
CALCIUM SERPL-MCNC: 8.7 MG/DL (ref 8.7–10.5)
CHLORIDE SERPL-SCNC: 101 MMOL/L (ref 95–110)
CHLORIDE SERPL-SCNC: 103 MMOL/L (ref 95–110)
CHLORIDE SERPL-SCNC: 97 MMOL/L (ref 95–110)
CHLORIDE SERPL-SCNC: 99 MMOL/L (ref 95–110)
CK SERPL-CCNC: 28 U/L (ref 20–180)
CO2 SERPL-SCNC: 19 MMOL/L (ref 23–29)
CO2 SERPL-SCNC: 23 MMOL/L (ref 23–29)
CO2 SERPL-SCNC: 23 MMOL/L (ref 23–29)
CO2 SERPL-SCNC: 24 MMOL/L (ref 23–29)
CODING SYSTEM: NORMAL
CREAT SERPL-MCNC: 2.1 MG/DL (ref 0.5–1.4)
CREAT SERPL-MCNC: 6.1 MG/DL (ref 0.5–1.4)
CREAT SERPL-MCNC: 6.3 MG/DL (ref 0.5–1.4)
CREAT SERPL-MCNC: 7.2 MG/DL (ref 0.5–1.4)
CROSSMATCH INTERPRETATION: NORMAL
DIFFERENTIAL METHOD BLD: ABNORMAL
DISPENSE STATUS: NORMAL
EOSINOPHIL # BLD AUTO: 0.1 K/UL (ref 0–0.5)
EOSINOPHIL # BLD AUTO: 0.2 K/UL (ref 0–0.5)
EOSINOPHIL # BLD AUTO: 0.2 K/UL (ref 0–0.5)
EOSINOPHIL NFR BLD: 0.8 % (ref 0–8)
EOSINOPHIL NFR BLD: 3.5 % (ref 0–8)
EOSINOPHIL NFR BLD: 3.9 % (ref 0–8)
ERYTHROCYTE [DISTWIDTH] IN BLOOD BY AUTOMATED COUNT: 16.4 % (ref 11.5–14.5)
ERYTHROCYTE [DISTWIDTH] IN BLOOD BY AUTOMATED COUNT: 16.9 % (ref 11.5–14.5)
ERYTHROCYTE [DISTWIDTH] IN BLOOD BY AUTOMATED COUNT: 17.6 % (ref 11.5–14.5)
EST. GFR  (NO RACE VARIABLE): 27 ML/MIN/1.73 M^2
EST. GFR  (NO RACE VARIABLE): 6.1 ML/MIN/1.73 M^2
EST. GFR  (NO RACE VARIABLE): 7.2 ML/MIN/1.73 M^2
EST. GFR  (NO RACE VARIABLE): 7.5 ML/MIN/1.73 M^2
GLUCOSE SERPL-MCNC: 105 MG/DL (ref 70–110)
GLUCOSE SERPL-MCNC: 167 MG/DL (ref 70–110)
GLUCOSE SERPL-MCNC: 241 MG/DL (ref 70–110)
GLUCOSE SERPL-MCNC: 289 MG/DL (ref 70–110)
HCT VFR BLD AUTO: 22.6 % (ref 37–48.5)
HCT VFR BLD AUTO: 23.9 % (ref 37–48.5)
HCT VFR BLD AUTO: 25 % (ref 37–48.5)
HGB BLD-MCNC: 7.6 G/DL (ref 12–16)
HGB BLD-MCNC: 7.6 G/DL (ref 12–16)
HGB BLD-MCNC: 8.2 G/DL (ref 12–16)
IMM GRANULOCYTES # BLD AUTO: 0.07 K/UL (ref 0–0.04)
IMM GRANULOCYTES # BLD AUTO: 0.09 K/UL (ref 0–0.04)
IMM GRANULOCYTES # BLD AUTO: 0.14 K/UL (ref 0–0.04)
IMM GRANULOCYTES NFR BLD AUTO: 1.1 % (ref 0–0.5)
IMM GRANULOCYTES NFR BLD AUTO: 1.4 % (ref 0–0.5)
IMM GRANULOCYTES NFR BLD AUTO: 1.5 % (ref 0–0.5)
INR PPP: 1 (ref 0.8–1.2)
LYMPHOCYTES # BLD AUTO: 0.7 K/UL (ref 1–4.8)
LYMPHOCYTES # BLD AUTO: 0.9 K/UL (ref 1–4.8)
LYMPHOCYTES # BLD AUTO: 1.1 K/UL (ref 1–4.8)
LYMPHOCYTES NFR BLD: 15.2 % (ref 18–48)
LYMPHOCYTES NFR BLD: 16.1 % (ref 18–48)
LYMPHOCYTES NFR BLD: 7 % (ref 18–48)
MAGNESIUM SERPL-MCNC: 1.8 MG/DL (ref 1.6–2.6)
MAGNESIUM SERPL-MCNC: 1.9 MG/DL (ref 1.6–2.6)
MAGNESIUM SERPL-MCNC: 1.9 MG/DL (ref 1.6–2.6)
MCH RBC QN AUTO: 29.2 PG (ref 27–31)
MCH RBC QN AUTO: 29.7 PG (ref 27–31)
MCH RBC QN AUTO: 30.2 PG (ref 27–31)
MCHC RBC AUTO-ENTMCNC: 31.8 G/DL (ref 32–36)
MCHC RBC AUTO-ENTMCNC: 32.8 G/DL (ref 32–36)
MCHC RBC AUTO-ENTMCNC: 33.6 G/DL (ref 32–36)
MCV RBC AUTO: 90 FL (ref 82–98)
MCV RBC AUTO: 91 FL (ref 82–98)
MCV RBC AUTO: 92 FL (ref 82–98)
MONOCYTES # BLD AUTO: 0.4 K/UL (ref 0.3–1)
MONOCYTES # BLD AUTO: 0.6 K/UL (ref 0.3–1)
MONOCYTES # BLD AUTO: 0.6 K/UL (ref 0.3–1)
MONOCYTES NFR BLD: 6 % (ref 4–15)
MONOCYTES NFR BLD: 7.4 % (ref 4–15)
MONOCYTES NFR BLD: 8.4 % (ref 4–15)
NEUTROPHILS # BLD AUTO: 4.2 K/UL (ref 1.8–7.7)
NEUTROPHILS # BLD AUTO: 4.6 K/UL (ref 1.8–7.7)
NEUTROPHILS # BLD AUTO: 8.7 K/UL (ref 1.8–7.7)
NEUTROPHILS NFR BLD: 70.3 % (ref 38–73)
NEUTROPHILS NFR BLD: 71.7 % (ref 38–73)
NEUTROPHILS NFR BLD: 84.4 % (ref 38–73)
NRBC BLD-RTO: 0 /100 WBC
NUM UNITS TRANS FFP: NORMAL
PHOSPHATE SERPL-MCNC: 1.8 MG/DL (ref 2.7–4.5)
PLATELET # BLD AUTO: 148 K/UL (ref 150–450)
PLATELET # BLD AUTO: 159 K/UL (ref 150–450)
PLATELET # BLD AUTO: 163 K/UL (ref 150–450)
PMV BLD AUTO: 10.6 FL (ref 9.2–12.9)
PMV BLD AUTO: 11 FL (ref 9.2–12.9)
PMV BLD AUTO: 11.3 FL (ref 9.2–12.9)
POCT GLUCOSE: 114 MG/DL (ref 70–110)
POCT GLUCOSE: 121 MG/DL (ref 70–110)
POCT GLUCOSE: 144 MG/DL (ref 70–110)
POCT GLUCOSE: 159 MG/DL (ref 70–110)
POCT GLUCOSE: 177 MG/DL (ref 70–110)
POCT GLUCOSE: 192 MG/DL (ref 70–110)
POCT GLUCOSE: 205 MG/DL (ref 70–110)
POCT GLUCOSE: 206 MG/DL (ref 70–110)
POCT GLUCOSE: 213 MG/DL (ref 70–110)
POCT GLUCOSE: 217 MG/DL (ref 70–110)
POCT GLUCOSE: 223 MG/DL (ref 70–110)
POCT GLUCOSE: 246 MG/DL (ref 70–110)
POCT GLUCOSE: 251 MG/DL (ref 70–110)
POCT GLUCOSE: 252 MG/DL (ref 70–110)
POCT GLUCOSE: 267 MG/DL (ref 70–110)
POCT GLUCOSE: 289 MG/DL (ref 70–110)
POCT GLUCOSE: 312 MG/DL (ref 70–110)
POTASSIUM SERPL-SCNC: 3.1 MMOL/L (ref 3.5–5.1)
POTASSIUM SERPL-SCNC: 4.5 MMOL/L (ref 3.5–5.1)
POTASSIUM SERPL-SCNC: 4.7 MMOL/L (ref 3.5–5.1)
POTASSIUM SERPL-SCNC: 5 MMOL/L (ref 3.5–5.1)
PROT SERPL-MCNC: 6.1 G/DL (ref 6–8.4)
PROT SERPL-MCNC: 6.2 G/DL (ref 6–8.4)
PROT SERPL-MCNC: 6.7 G/DL (ref 6–8.4)
PROTHROMBIN TIME: 11 SEC (ref 9–12.5)
RBC # BLD AUTO: 2.52 M/UL (ref 4–5.4)
RBC # BLD AUTO: 2.6 M/UL (ref 4–5.4)
RBC # BLD AUTO: 2.76 M/UL (ref 4–5.4)
SODIUM SERPL-SCNC: 133 MMOL/L (ref 136–145)
SODIUM SERPL-SCNC: 134 MMOL/L (ref 136–145)
SODIUM SERPL-SCNC: 134 MMOL/L (ref 136–145)
SODIUM SERPL-SCNC: 136 MMOL/L (ref 136–145)
SPECIMEN OUTDATE: NORMAL
TRANS ERYTHROCYTES VOL PATIENT: NORMAL ML
WBC # BLD AUTO: 10.31 K/UL (ref 3.9–12.7)
WBC # BLD AUTO: 5.91 K/UL (ref 3.9–12.7)
WBC # BLD AUTO: 6.58 K/UL (ref 3.9–12.7)

## 2024-01-01 PROCEDURE — 25000003 PHARM REV CODE 250: Performed by: NURSE PRACTITIONER

## 2024-01-01 PROCEDURE — 85730 THROMBOPLASTIN TIME PARTIAL: CPT | Performed by: STUDENT IN AN ORGANIZED HEALTH CARE EDUCATION/TRAINING PROGRAM

## 2024-01-01 PROCEDURE — 25000003 PHARM REV CODE 250: Performed by: INTERNAL MEDICINE

## 2024-01-01 PROCEDURE — 80053 COMPREHEN METABOLIC PANEL: CPT | Performed by: STUDENT IN AN ORGANIZED HEALTH CARE EDUCATION/TRAINING PROGRAM

## 2024-01-01 PROCEDURE — 30233N1 TRANSFUSION OF NONAUTOLOGOUS RED BLOOD CELLS INTO PERIPHERAL VEIN, PERCUTANEOUS APPROACH: ICD-10-PCS | Performed by: STUDENT IN AN ORGANIZED HEALTH CARE EDUCATION/TRAINING PROGRAM

## 2024-01-01 PROCEDURE — D9220A PRA ANESTHESIA: Mod: ANES,,, | Performed by: ANESTHESIOLOGY

## 2024-01-01 PROCEDURE — 25000003 PHARM REV CODE 250: Performed by: STUDENT IN AN ORGANIZED HEALTH CARE EDUCATION/TRAINING PROGRAM

## 2024-01-01 PROCEDURE — 94640 AIRWAY INHALATION TREATMENT: CPT

## 2024-01-01 PROCEDURE — 63600175 PHARM REV CODE 636 W HCPCS: Performed by: STUDENT IN AN ORGANIZED HEALTH CARE EDUCATION/TRAINING PROGRAM

## 2024-01-01 PROCEDURE — C1730 CATH, EP, 19 OR FEW ELECT: HCPCS | Performed by: STUDENT IN AN ORGANIZED HEALTH CARE EDUCATION/TRAINING PROGRAM

## 2024-01-01 PROCEDURE — 36415 COLL VENOUS BLD VENIPUNCTURE: CPT | Performed by: STUDENT IN AN ORGANIZED HEALTH CARE EDUCATION/TRAINING PROGRAM

## 2024-01-01 PROCEDURE — 33244 REMOVE ELCTRD TRANSVENOUSLY: CPT | Performed by: STUDENT IN AN ORGANIZED HEALTH CARE EDUCATION/TRAINING PROGRAM

## 2024-01-01 PROCEDURE — 92950 HEART/LUNG RESUSCITATION CPR: CPT | Mod: ,,, | Performed by: STUDENT IN AN ORGANIZED HEALTH CARE EDUCATION/TRAINING PROGRAM

## 2024-01-01 PROCEDURE — 63600175 PHARM REV CODE 636 W HCPCS: Mod: JZ,JG | Performed by: STUDENT IN AN ORGANIZED HEALTH CARE EDUCATION/TRAINING PROGRAM

## 2024-01-01 PROCEDURE — 92950 HEART/LUNG RESUSCITATION CPR: CPT | Performed by: STUDENT IN AN ORGANIZED HEALTH CARE EDUCATION/TRAINING PROGRAM

## 2024-01-01 PROCEDURE — 82550 ASSAY OF CK (CPK): CPT | Performed by: STUDENT IN AN ORGANIZED HEALTH CARE EDUCATION/TRAINING PROGRAM

## 2024-01-01 PROCEDURE — 27000207 HC ISOLATION

## 2024-01-01 PROCEDURE — 25000003 PHARM REV CODE 250: Performed by: NURSE ANESTHETIST, CERTIFIED REGISTERED

## 2024-01-01 PROCEDURE — C2629 INTRO/SHEATH, LASER: HCPCS | Performed by: STUDENT IN AN ORGANIZED HEALTH CARE EDUCATION/TRAINING PROGRAM

## 2024-01-01 PROCEDURE — 25000242 PHARM REV CODE 250 ALT 637 W/ HCPCS

## 2024-01-01 PROCEDURE — 80100014 HC HEMODIALYSIS 1:1

## 2024-01-01 PROCEDURE — C1894 INTRO/SHEATH, NON-LASER: HCPCS | Performed by: STUDENT IN AN ORGANIZED HEALTH CARE EDUCATION/TRAINING PROGRAM

## 2024-01-01 PROCEDURE — C2628 CATHETER, OCCLUSION: HCPCS | Performed by: STUDENT IN AN ORGANIZED HEALTH CARE EDUCATION/TRAINING PROGRAM

## 2024-01-01 PROCEDURE — 80069 RENAL FUNCTION PANEL: CPT | Performed by: HOSPITALIST

## 2024-01-01 PROCEDURE — 86920 COMPATIBILITY TEST SPIN: CPT | Performed by: STUDENT IN AN ORGANIZED HEALTH CARE EDUCATION/TRAINING PROGRAM

## 2024-01-01 PROCEDURE — 85025 COMPLETE CBC W/AUTO DIFF WBC: CPT | Performed by: STUDENT IN AN ORGANIZED HEALTH CARE EDUCATION/TRAINING PROGRAM

## 2024-01-01 PROCEDURE — 33244 REMOVE ELCTRD TRANSVENOUSLY: CPT | Mod: 22,,, | Performed by: STUDENT IN AN ORGANIZED HEALTH CARE EDUCATION/TRAINING PROGRAM

## 2024-01-01 PROCEDURE — 36415 COLL VENOUS BLD VENIPUNCTURE: CPT | Mod: XB | Performed by: STUDENT IN AN ORGANIZED HEALTH CARE EDUCATION/TRAINING PROGRAM

## 2024-01-01 PROCEDURE — 87070 CULTURE OTHR SPECIMN AEROBIC: CPT | Mod: 59 | Performed by: HOSPITALIST

## 2024-01-01 PROCEDURE — 0W9D3ZZ DRAINAGE OF PERICARDIAL CAVITY, PERCUTANEOUS APPROACH: ICD-10-PCS | Performed by: STUDENT IN AN ORGANIZED HEALTH CARE EDUCATION/TRAINING PROGRAM

## 2024-01-01 PROCEDURE — 99232 SBSQ HOSP IP/OBS MODERATE 35: CPT | Mod: GC,,, | Performed by: STUDENT IN AN ORGANIZED HEALTH CARE EDUCATION/TRAINING PROGRAM

## 2024-01-01 PROCEDURE — 99900035 HC TECH TIME PER 15 MIN (STAT)

## 2024-01-01 PROCEDURE — 94761 N-INVAS EAR/PLS OXIMETRY MLT: CPT

## 2024-01-01 PROCEDURE — 86850 RBC ANTIBODY SCREEN: CPT | Performed by: STUDENT IN AN ORGANIZED HEALTH CARE EDUCATION/TRAINING PROGRAM

## 2024-01-01 PROCEDURE — 90935 HEMODIALYSIS ONE EVALUATION: CPT | Mod: ,,, | Performed by: INTERNAL MEDICINE

## 2024-01-01 PROCEDURE — 99232 SBSQ HOSP IP/OBS MODERATE 35: CPT | Mod: ,,, | Performed by: PHYSICIAN ASSISTANT

## 2024-01-01 PROCEDURE — 99232 SBSQ HOSP IP/OBS MODERATE 35: CPT | Mod: ,,,

## 2024-01-01 PROCEDURE — C1769 GUIDE WIRE: HCPCS | Performed by: STUDENT IN AN ORGANIZED HEALTH CARE EDUCATION/TRAINING PROGRAM

## 2024-01-01 PROCEDURE — 20600001 HC STEP DOWN PRIVATE ROOM

## 2024-01-01 PROCEDURE — 0JB60ZZ EXCISION OF CHEST SUBCUTANEOUS TISSUE AND FASCIA, OPEN APPROACH: ICD-10-PCS | Performed by: STUDENT IN AN ORGANIZED HEALTH CARE EDUCATION/TRAINING PROGRAM

## 2024-01-01 PROCEDURE — 63600175 PHARM REV CODE 636 W HCPCS: Performed by: NURSE PRACTITIONER

## 2024-01-01 PROCEDURE — 33241 REMOVE PULSE GENERATOR: CPT | Mod: ,,, | Performed by: STUDENT IN AN ORGANIZED HEALTH CARE EDUCATION/TRAINING PROGRAM

## 2024-01-01 PROCEDURE — 87070 CULTURE OTHR SPECIMN AEROBIC: CPT | Mod: 59 | Performed by: STUDENT IN AN ORGANIZED HEALTH CARE EDUCATION/TRAINING PROGRAM

## 2024-01-01 PROCEDURE — 99233 SBSQ HOSP IP/OBS HIGH 50: CPT | Mod: GC,,, | Performed by: STUDENT IN AN ORGANIZED HEALTH CARE EDUCATION/TRAINING PROGRAM

## 2024-01-01 PROCEDURE — 63600175 PHARM REV CODE 636 W HCPCS: Performed by: INTERNAL MEDICINE

## 2024-01-01 PROCEDURE — D9220A PRA ANESTHESIA: Mod: CRNA,,, | Performed by: NURSE ANESTHETIST, CERTIFIED REGISTERED

## 2024-01-01 PROCEDURE — 90935 HEMODIALYSIS ONE EVALUATION: CPT | Mod: GC,,, | Performed by: INTERNAL MEDICINE

## 2024-01-01 PROCEDURE — P9021 RED BLOOD CELLS UNIT: HCPCS | Performed by: STUDENT IN AN ORGANIZED HEALTH CARE EDUCATION/TRAINING PROGRAM

## 2024-01-01 PROCEDURE — 63600175 PHARM REV CODE 636 W HCPCS

## 2024-01-01 PROCEDURE — 37000009 HC ANESTHESIA EA ADD 15 MINS: Performed by: STUDENT IN AN ORGANIZED HEALTH CARE EDUCATION/TRAINING PROGRAM

## 2024-01-01 PROCEDURE — 83735 ASSAY OF MAGNESIUM: CPT | Performed by: STUDENT IN AN ORGANIZED HEALTH CARE EDUCATION/TRAINING PROGRAM

## 2024-01-01 PROCEDURE — 63600175 PHARM REV CODE 636 W HCPCS: Performed by: NURSE ANESTHETIST, CERTIFIED REGISTERED

## 2024-01-01 PROCEDURE — 33241 REMOVE PULSE GENERATOR: CPT | Performed by: STUDENT IN AN ORGANIZED HEALTH CARE EDUCATION/TRAINING PROGRAM

## 2024-01-01 PROCEDURE — 27000239 HC STAND-BY BYPASS PUMP

## 2024-01-01 PROCEDURE — 99233 SBSQ HOSP IP/OBS HIGH 50: CPT | Mod: ,,, | Performed by: STUDENT IN AN ORGANIZED HEALTH CARE EDUCATION/TRAINING PROGRAM

## 2024-01-01 PROCEDURE — 33016 PERICARDIOCENTESIS W/IMAGING: CPT | Mod: ,,, | Performed by: STUDENT IN AN ORGANIZED HEALTH CARE EDUCATION/TRAINING PROGRAM

## 2024-01-01 PROCEDURE — 87075 CULTR BACTERIA EXCEPT BLOOD: CPT | Mod: 59 | Performed by: HOSPITALIST

## 2024-01-01 PROCEDURE — 11042 DBRDMT SUBQ TIS 1ST 20SQCM/<: CPT | Mod: 59 | Performed by: STUDENT IN AN ORGANIZED HEALTH CARE EDUCATION/TRAINING PROGRAM

## 2024-01-01 PROCEDURE — 87075 CULTR BACTERIA EXCEPT BLOOD: CPT | Mod: 59 | Performed by: STUDENT IN AN ORGANIZED HEALTH CARE EDUCATION/TRAINING PROGRAM

## 2024-01-01 PROCEDURE — C1893 INTRO/SHEATH, FIXED,NON-PEEL: HCPCS | Performed by: STUDENT IN AN ORGANIZED HEALTH CARE EDUCATION/TRAINING PROGRAM

## 2024-01-01 PROCEDURE — 27201423 OPTIME MED/SURG SUP & DEVICES STERILE SUPPLY: Performed by: STUDENT IN AN ORGANIZED HEALTH CARE EDUCATION/TRAINING PROGRAM

## 2024-01-01 PROCEDURE — 02PA3MZ REMOVAL OF CARDIAC LEAD FROM HEART, PERCUTANEOUS APPROACH: ICD-10-PCS | Performed by: STUDENT IN AN ORGANIZED HEALTH CARE EDUCATION/TRAINING PROGRAM

## 2024-01-01 PROCEDURE — 85610 PROTHROMBIN TIME: CPT | Performed by: STUDENT IN AN ORGANIZED HEALTH CARE EDUCATION/TRAINING PROGRAM

## 2024-01-01 PROCEDURE — 5A12012 PERFORMANCE OF CARDIAC OUTPUT, SINGLE, MANUAL: ICD-10-PCS | Performed by: STUDENT IN AN ORGANIZED HEALTH CARE EDUCATION/TRAINING PROGRAM

## 2024-01-01 PROCEDURE — 99223 1ST HOSP IP/OBS HIGH 75: CPT | Mod: GC,,, | Performed by: STUDENT IN AN ORGANIZED HEALTH CARE EDUCATION/TRAINING PROGRAM

## 2024-01-01 PROCEDURE — 87040 BLOOD CULTURE FOR BACTERIA: CPT | Mod: 59 | Performed by: STUDENT IN AN ORGANIZED HEALTH CARE EDUCATION/TRAINING PROGRAM

## 2024-01-01 PROCEDURE — 36620 INSERTION CATHETER ARTERY: CPT | Mod: 59,,, | Performed by: ANESTHESIOLOGY

## 2024-01-01 PROCEDURE — 37000008 HC ANESTHESIA 1ST 15 MINUTES: Performed by: STUDENT IN AN ORGANIZED HEALTH CARE EDUCATION/TRAINING PROGRAM

## 2024-01-01 PROCEDURE — 11042 DBRDMT SUBQ TIS 1ST 20SQCM/<: CPT | Mod: 59,,, | Performed by: STUDENT IN AN ORGANIZED HEALTH CARE EDUCATION/TRAINING PROGRAM

## 2024-01-01 PROCEDURE — 33016 PERICARDIOCENTESIS W/IMAGING: CPT | Performed by: STUDENT IN AN ORGANIZED HEALTH CARE EDUCATION/TRAINING PROGRAM

## 2024-01-01 PROCEDURE — 0JPT0PZ REMOVAL OF CARDIAC RHYTHM RELATED DEVICE FROM TRUNK SUBCUTANEOUS TISSUE AND FASCIA, OPEN APPROACH: ICD-10-PCS | Performed by: STUDENT IN AN ORGANIZED HEALTH CARE EDUCATION/TRAINING PROGRAM

## 2024-01-01 RX ORDER — SODIUM CHLORIDE 0.9 G/100ML
IRRIGANT IRRIGATION
Status: DISCONTINUED | OUTPATIENT
Start: 2024-01-01 | End: 2024-01-05 | Stop reason: HOSPADM

## 2024-01-01 RX ORDER — PROCHLORPERAZINE EDISYLATE 5 MG/ML
2.5 INJECTION INTRAMUSCULAR; INTRAVENOUS EVERY 4 HOURS PRN
Status: DISCONTINUED | OUTPATIENT
Start: 2024-01-01 | End: 2024-01-05 | Stop reason: HOSPADM

## 2024-01-01 RX ORDER — FENTANYL CITRATE 50 UG/ML
INJECTION, SOLUTION INTRAMUSCULAR; INTRAVENOUS
Status: DISCONTINUED | OUTPATIENT
Start: 2024-01-01 | End: 2024-01-01

## 2024-01-01 RX ORDER — ONDANSETRON HYDROCHLORIDE 4 MG/5ML
4 SOLUTION ORAL EVERY 6 HOURS PRN
Status: CANCELLED | OUTPATIENT
Start: 2024-01-01

## 2024-01-01 RX ORDER — LIDOCAINE HYDROCHLORIDE 20 MG/ML
INJECTION, SOLUTION INFILTRATION; PERINEURAL
Status: DISCONTINUED | OUTPATIENT
Start: 2024-01-01 | End: 2024-01-05 | Stop reason: HOSPADM

## 2024-01-01 RX ORDER — FENTANYL CITRATE 50 UG/ML
25 INJECTION, SOLUTION INTRAMUSCULAR; INTRAVENOUS EVERY 5 MIN PRN
Status: CANCELLED | OUTPATIENT
Start: 2024-01-01

## 2024-01-01 RX ORDER — SODIUM CHLORIDE 9 MG/ML
INJECTION, SOLUTION INTRAVENOUS ONCE
Status: COMPLETED | OUTPATIENT
Start: 2024-01-01 | End: 2024-01-01

## 2024-01-01 RX ORDER — HYDROCODONE BITARTRATE AND ACETAMINOPHEN 500; 5 MG/1; MG/1
TABLET ORAL
Status: DISCONTINUED | OUTPATIENT
Start: 2024-01-01 | End: 2024-01-05 | Stop reason: HOSPADM

## 2024-01-01 RX ORDER — ETOMIDATE 2 MG/ML
INJECTION INTRAVENOUS
Status: DISCONTINUED | OUTPATIENT
Start: 2024-01-01 | End: 2024-01-01

## 2024-01-01 RX ORDER — SENNOSIDES 8.6 MG/1
8.6 TABLET ORAL DAILY
Status: DISCONTINUED | OUTPATIENT
Start: 2024-01-01 | End: 2024-01-05 | Stop reason: HOSPADM

## 2024-01-01 RX ORDER — POLYETHYLENE GLYCOL 3350 17 G/17G
17 POWDER, FOR SOLUTION ORAL 3 TIMES DAILY
Status: DISCONTINUED | OUTPATIENT
Start: 2024-01-01 | End: 2024-01-05 | Stop reason: HOSPADM

## 2024-01-01 RX ORDER — DEXAMETHASONE SODIUM PHOSPHATE 4 MG/ML
INJECTION, SOLUTION INTRA-ARTICULAR; INTRALESIONAL; INTRAMUSCULAR; INTRAVENOUS; SOFT TISSUE
Status: DISCONTINUED | OUTPATIENT
Start: 2024-01-01 | End: 2024-01-01

## 2024-01-01 RX ORDER — PHENYLEPHRINE HYDROCHLORIDE 10 MG/ML
INJECTION INTRAVENOUS
Status: DISCONTINUED | OUTPATIENT
Start: 2024-01-01 | End: 2024-01-01

## 2024-01-01 RX ORDER — ONDANSETRON 2 MG/ML
4 INJECTION INTRAMUSCULAR; INTRAVENOUS EVERY 8 HOURS PRN
Status: DISCONTINUED | OUTPATIENT
Start: 2024-01-01 | End: 2024-01-05 | Stop reason: HOSPADM

## 2024-01-01 RX ORDER — ONDANSETRON 2 MG/ML
4 INJECTION INTRAMUSCULAR; INTRAVENOUS ONCE AS NEEDED
Status: CANCELLED | OUTPATIENT
Start: 2024-01-01 | End: 2035-06-02

## 2024-01-01 RX ORDER — INDOMETHACIN 25 MG/1
CAPSULE ORAL
Status: DISCONTINUED | OUTPATIENT
Start: 2024-01-01 | End: 2024-01-01

## 2024-01-01 RX ORDER — MIDAZOLAM HYDROCHLORIDE 1 MG/ML
INJECTION, SOLUTION INTRAMUSCULAR; INTRAVENOUS
Status: DISCONTINUED | OUTPATIENT
Start: 2024-01-01 | End: 2024-01-01

## 2024-01-01 RX ORDER — VASOPRESSIN 20 [USP'U]/ML
INJECTION, SOLUTION INTRAMUSCULAR; SUBCUTANEOUS
Status: DISCONTINUED | OUTPATIENT
Start: 2024-01-01 | End: 2024-01-01

## 2024-01-01 RX ORDER — CALCIUM CHLORIDE INJECTION 100 MG/ML
INJECTION, SOLUTION INTRAVENOUS
Status: DISCONTINUED | OUTPATIENT
Start: 2024-01-01 | End: 2024-01-01

## 2024-01-01 RX ORDER — BUPIVACAINE HYDROCHLORIDE 2.5 MG/ML
INJECTION, SOLUTION EPIDURAL; INFILTRATION; INTRACAUDAL
Status: DISCONTINUED | OUTPATIENT
Start: 2024-01-01 | End: 2024-01-05 | Stop reason: HOSPADM

## 2024-01-01 RX ORDER — HEPARIN SODIUM,PORCINE/D5W 25000/250
0-40 INTRAVENOUS SOLUTION INTRAVENOUS CONTINUOUS
Status: ACTIVE | OUTPATIENT
Start: 2024-01-01 | End: 2024-01-01

## 2024-01-01 RX ORDER — ROCURONIUM BROMIDE 10 MG/ML
INJECTION, SOLUTION INTRAVENOUS
Status: DISCONTINUED | OUTPATIENT
Start: 2024-01-01 | End: 2024-01-01

## 2024-01-01 RX ORDER — DIPHENHYDRAMINE HYDROCHLORIDE 50 MG/ML
25 INJECTION, SOLUTION INTRAMUSCULAR; INTRAVENOUS EVERY 6 HOURS PRN
Status: CANCELLED | OUTPATIENT
Start: 2024-01-01

## 2024-01-01 RX ORDER — HYDROMORPHONE HYDROCHLORIDE 1 MG/ML
0.2 INJECTION, SOLUTION INTRAMUSCULAR; INTRAVENOUS; SUBCUTANEOUS EVERY 5 MIN PRN
Status: CANCELLED | OUTPATIENT
Start: 2024-01-01

## 2024-01-01 RX ORDER — MUPIROCIN 20 MG/G
OINTMENT TOPICAL 2 TIMES DAILY
Status: DISCONTINUED | OUTPATIENT
Start: 2024-01-01 | End: 2024-01-05 | Stop reason: HOSPADM

## 2024-01-01 RX ORDER — ONDANSETRON 2 MG/ML
INJECTION INTRAMUSCULAR; INTRAVENOUS
Status: DISCONTINUED | OUTPATIENT
Start: 2024-01-01 | End: 2024-01-01

## 2024-01-01 RX ORDER — VANCOMYCIN HYDROCHLORIDE 1 G/20ML
INJECTION, POWDER, LYOPHILIZED, FOR SOLUTION INTRAVENOUS
Status: DISCONTINUED | OUTPATIENT
Start: 2024-01-01 | End: 2024-01-05 | Stop reason: HOSPADM

## 2024-01-01 RX ORDER — EPINEPHRINE 0.1 MG/ML
INJECTION INTRACARDIAC; INTRAVENOUS
Status: DISCONTINUED | OUTPATIENT
Start: 2024-01-01 | End: 2024-01-01

## 2024-01-01 RX ORDER — ACETAMINOPHEN 325 MG/1
650 TABLET ORAL EVERY 6 HOURS PRN
Status: DISCONTINUED | OUTPATIENT
Start: 2024-01-01 | End: 2024-01-05 | Stop reason: HOSPADM

## 2024-01-01 RX ORDER — HEPARIN SODIUM,PORCINE/D5W 25000/250
0-40 INTRAVENOUS SOLUTION INTRAVENOUS CONTINUOUS
Status: DISCONTINUED | OUTPATIENT
Start: 2024-01-01 | End: 2024-01-01

## 2024-01-01 RX ORDER — LIDOCAINE HYDROCHLORIDE 20 MG/ML
INJECTION, SOLUTION EPIDURAL; INFILTRATION; INTRACAUDAL; PERINEURAL
Status: DISCONTINUED | OUTPATIENT
Start: 2024-01-01 | End: 2024-01-01

## 2024-01-01 RX ORDER — PROPOFOL 10 MG/ML
VIAL (ML) INTRAVENOUS
Status: DISCONTINUED | OUTPATIENT
Start: 2024-01-01 | End: 2024-01-01

## 2024-01-01 RX ORDER — SODIUM CHLORIDE 9 MG/ML
INJECTION, SOLUTION INTRAVENOUS
Status: DISCONTINUED | OUTPATIENT
Start: 2024-01-01 | End: 2024-01-05 | Stop reason: HOSPADM

## 2024-01-01 RX ADMIN — INSULIN ASPART 3 UNITS: 100 INJECTION, SOLUTION INTRAVENOUS; SUBCUTANEOUS at 12:01

## 2024-01-01 RX ADMIN — VASOPRESSIN 1 UNITS: 20 INJECTION INTRAVENOUS at 02:01

## 2024-01-01 RX ADMIN — ONDANSETRON 4 MG: 2 INJECTION INTRAMUSCULAR; INTRAVENOUS at 04:01

## 2024-01-01 RX ADMIN — EPINEPHRINE 100 MCG: 0.1 INJECTION INTRAVENOUS at 06:01

## 2024-01-01 RX ADMIN — FENTANYL CITRATE 50 MCG: 50 INJECTION, SOLUTION INTRAMUSCULAR; INTRAVENOUS at 01:01

## 2024-01-01 RX ADMIN — LIDOCAINE HYDROCHLORIDE 60 MG: 20 INJECTION, SOLUTION EPIDURAL; INFILTRATION; INTRACAUDAL; PERINEURAL at 01:01

## 2024-01-01 RX ADMIN — INSULIN ASPART 3 UNITS: 100 INJECTION, SOLUTION INTRAVENOUS; SUBCUTANEOUS at 01:01

## 2024-01-01 RX ADMIN — INSULIN ASPART 6 UNITS: 100 INJECTION, SOLUTION INTRAVENOUS; SUBCUTANEOUS at 09:01

## 2024-01-01 RX ADMIN — SACUBITRIL AND VALSARTAN 1 TABLET: 24; 26 TABLET, FILM COATED ORAL at 08:01

## 2024-01-01 RX ADMIN — CEFTAROLINE FOSAMIL 200 MG: 400 POWDER, FOR SOLUTION INTRAVENOUS at 10:01

## 2024-01-01 RX ADMIN — CALCIUM ACETATE 667 MG: 667 CAPSULE ORAL at 09:01

## 2024-01-01 RX ADMIN — SODIUM CHLORIDE: 9 INJECTION, SOLUTION INTRAVENOUS at 07:01

## 2024-01-01 RX ADMIN — ALBUTEROL SULFATE 2.5 MG: 2.5 SOLUTION RESPIRATORY (INHALATION) at 08:01

## 2024-01-01 RX ADMIN — ALBUTEROL SULFATE 2.5 MG: 2.5 SOLUTION RESPIRATORY (INHALATION) at 11:01

## 2024-01-01 RX ADMIN — PROMETHAZINE HYDROCHLORIDE 25 MG: 25 TABLET ORAL at 11:01

## 2024-01-01 RX ADMIN — HEPARIN SODIUM 1000 UNITS: 1000 INJECTION, SOLUTION INTRAVENOUS; SUBCUTANEOUS at 08:01

## 2024-01-01 RX ADMIN — DAPTOMYCIN 785 MG: 500 INJECTION, POWDER, LYOPHILIZED, FOR SOLUTION INTRAVENOUS at 02:01

## 2024-01-01 RX ADMIN — METOPROLOL SUCCINATE 50 MG: 50 TABLET, EXTENDED RELEASE ORAL at 02:01

## 2024-01-01 RX ADMIN — CALCIUM ACETATE 667 MG: 667 CAPSULE ORAL at 07:01

## 2024-01-01 RX ADMIN — ROCURONIUM BROMIDE 20 MG: 10 INJECTION INTRAVENOUS at 06:01

## 2024-01-01 RX ADMIN — METOPROLOL SUCCINATE 50 MG: 50 TABLET, EXTENDED RELEASE ORAL at 11:01

## 2024-01-01 RX ADMIN — EPINEPHRINE 1 MG: 0.1 INJECTION INTRAVENOUS at 06:01

## 2024-01-01 RX ADMIN — PANTOPRAZOLE SODIUM 40 MG: 40 TABLET, DELAYED RELEASE ORAL at 01:01

## 2024-01-01 RX ADMIN — ERYTHROPOIETIN 7900 UNITS: 10000 INJECTION, SOLUTION INTRAVENOUS; SUBCUTANEOUS at 08:01

## 2024-01-01 RX ADMIN — VASOPRESSIN 0.1 UNITS: 20 INJECTION INTRAVENOUS at 06:01

## 2024-01-01 RX ADMIN — HYDRALAZINE HYDROCHLORIDE 25 MG: 25 TABLET ORAL at 09:01

## 2024-01-01 RX ADMIN — PANTOPRAZOLE SODIUM 40 MG: 40 TABLET, DELAYED RELEASE ORAL at 02:01

## 2024-01-01 RX ADMIN — CEFTAROLINE FOSAMIL 200 MG: 400 POWDER, FOR SOLUTION INTRAVENOUS at 05:01

## 2024-01-01 RX ADMIN — ALBUTEROL SULFATE 2.5 MG: 2.5 SOLUTION RESPIRATORY (INHALATION) at 04:01

## 2024-01-01 RX ADMIN — EPINEPHRINE 0.04 MCG/KG/MIN: 1 INJECTION INTRAMUSCULAR; INTRAVENOUS; SUBCUTANEOUS at 06:01

## 2024-01-01 RX ADMIN — HYDRALAZINE HYDROCHLORIDE 10 MG: 20 INJECTION INTRAMUSCULAR; INTRAVENOUS at 05:01

## 2024-01-01 RX ADMIN — DOCUSATE SODIUM 50 MG: 50 CAPSULE, LIQUID FILLED ORAL at 12:01

## 2024-01-01 RX ADMIN — VASOPRESSIN 1 UNITS: 20 INJECTION INTRAVENOUS at 04:01

## 2024-01-01 RX ADMIN — SACUBITRIL AND VALSARTAN 1 TABLET: 24; 26 TABLET, FILM COATED ORAL at 02:01

## 2024-01-01 RX ADMIN — ROCURONIUM BROMIDE 50 MG: 10 INJECTION INTRAVENOUS at 01:01

## 2024-01-01 RX ADMIN — PROPOFOL 50 MG: 10 INJECTION, EMULSION INTRAVENOUS at 01:01

## 2024-01-01 RX ADMIN — INSULIN ASPART 6 UNITS: 100 INJECTION, SOLUTION INTRAVENOUS; SUBCUTANEOUS at 01:01

## 2024-01-01 RX ADMIN — ALBUTEROL SULFATE 2.5 MG: 2.5 SOLUTION RESPIRATORY (INHALATION) at 07:01

## 2024-01-01 RX ADMIN — ROCURONIUM BROMIDE 10 MG: 10 INJECTION INTRAVENOUS at 03:01

## 2024-01-01 RX ADMIN — HYDRALAZINE HYDROCHLORIDE 25 MG: 25 TABLET ORAL at 01:01

## 2024-01-01 RX ADMIN — ATORVASTATIN CALCIUM 40 MG: 40 TABLET, FILM COATED ORAL at 12:01

## 2024-01-01 RX ADMIN — CEFTAROLINE FOSAMIL 200 MG: 400 POWDER, FOR SOLUTION INTRAVENOUS at 02:01

## 2024-01-01 RX ADMIN — CEFTAROLINE FOSAMIL 200 MG: 400 POWDER, FOR SOLUTION INTRAVENOUS at 09:01

## 2024-01-01 RX ADMIN — CEFTAROLINE FOSAMIL 200 MG: 400 POWDER, FOR SOLUTION INTRAVENOUS at 01:01

## 2024-01-01 RX ADMIN — DAPTOMYCIN 785 MG: 500 INJECTION, POWDER, LYOPHILIZED, FOR SOLUTION INTRAVENOUS at 11:01

## 2024-01-01 RX ADMIN — ONDANSETRON 4 MG: 2 INJECTION INTRAMUSCULAR; INTRAVENOUS at 05:01

## 2024-01-01 RX ADMIN — CALCIUM ACETATE 667 MG: 667 CAPSULE ORAL at 05:01

## 2024-01-01 RX ADMIN — PREGABALIN 50 MG: 50 CAPSULE ORAL at 05:01

## 2024-01-01 RX ADMIN — INSULIN ASPART 2 UNITS: 100 INJECTION, SOLUTION INTRAVENOUS; SUBCUTANEOUS at 07:01

## 2024-01-01 RX ADMIN — VANCOMYCIN HYDROCHLORIDE 1000 MG: 1 INJECTION, POWDER, LYOPHILIZED, FOR SOLUTION INTRAVENOUS at 02:01

## 2024-01-01 RX ADMIN — CEFTAROLINE FOSAMIL 200 MG: 400 POWDER, FOR SOLUTION INTRAVENOUS at 03:01

## 2024-01-01 RX ADMIN — VASOPRESSIN 1 UNITS: 20 INJECTION INTRAVENOUS at 03:01

## 2024-01-01 RX ADMIN — SODIUM CHLORIDE: 0.9 INJECTION, SOLUTION INTRAVENOUS at 01:01

## 2024-01-01 RX ADMIN — HEPARIN SODIUM AND DEXTROSE 23 UNITS/KG/HR: 10000; 5 INJECTION INTRAVENOUS at 01:01

## 2024-01-01 RX ADMIN — ATORVASTATIN CALCIUM 40 MG: 40 TABLET, FILM COATED ORAL at 01:01

## 2024-01-01 RX ADMIN — MUPIROCIN: 20 OINTMENT TOPICAL at 09:01

## 2024-01-01 RX ADMIN — VASOPRESSIN 0.25 UNITS: 20 INJECTION INTRAVENOUS at 05:01

## 2024-01-01 RX ADMIN — EPINEPHRINE 200 MCG: 0.1 INJECTION INTRAVENOUS at 06:01

## 2024-01-01 RX ADMIN — SACUBITRIL AND VALSARTAN 1 TABLET: 24; 26 TABLET, FILM COATED ORAL at 09:01

## 2024-01-01 RX ADMIN — MIDAZOLAM HYDROCHLORIDE 2 MG: 1 INJECTION, SOLUTION INTRAMUSCULAR; INTRAVENOUS at 01:01

## 2024-01-01 RX ADMIN — EPINEPHRINE 1 MG: 0.1 INJECTION INTRAVENOUS at 07:01

## 2024-01-01 RX ADMIN — INSULIN ASPART 6 UNITS: 100 INJECTION, SOLUTION INTRAVENOUS; SUBCUTANEOUS at 08:01

## 2024-01-01 RX ADMIN — ATORVASTATIN CALCIUM 40 MG: 40 TABLET, FILM COATED ORAL at 02:01

## 2024-01-01 RX ADMIN — SACUBITRIL AND VALSARTAN 1 TABLET: 24; 26 TABLET, FILM COATED ORAL at 01:01

## 2024-01-01 RX ADMIN — HEPARIN SODIUM: 5000 INJECTION INTRAVENOUS; SUBCUTANEOUS at 10:01

## 2024-01-01 RX ADMIN — HYDRALAZINE HYDROCHLORIDE 25 MG: 25 TABLET ORAL at 02:01

## 2024-01-01 RX ADMIN — HYDRALAZINE HYDROCHLORIDE 25 MG: 25 TABLET ORAL at 10:01

## 2024-01-01 RX ADMIN — METOPROLOL SUCCINATE 50 MG: 50 TABLET, EXTENDED RELEASE ORAL at 12:01

## 2024-01-01 RX ADMIN — CALCIUM ACETATE 667 MG: 667 CAPSULE ORAL at 12:01

## 2024-01-01 RX ADMIN — PHENYLEPHRINE HYDROCHLORIDE 100 MCG: 10 INJECTION INTRAVENOUS at 02:01

## 2024-01-01 RX ADMIN — ASPIRIN 81 MG CHEWABLE TABLET 81 MG: 81 TABLET CHEWABLE at 11:01

## 2024-01-01 RX ADMIN — VASOPRESSIN 0.02 UNITS/MIN: 20 INJECTION INTRAVENOUS at 06:01

## 2024-01-01 RX ADMIN — INSULIN ASPART 2 UNITS: 100 INJECTION, SOLUTION INTRAVENOUS; SUBCUTANEOUS at 05:01

## 2024-01-01 RX ADMIN — FUROSEMIDE 80 MG: 80 TABLET ORAL at 01:01

## 2024-01-01 RX ADMIN — HEPARIN SODIUM: 5000 INJECTION INTRAVENOUS; SUBCUTANEOUS at 12:01

## 2024-01-01 RX ADMIN — VASOPRESSIN 1 UNITS: 20 INJECTION INTRAVENOUS at 06:01

## 2024-01-01 RX ADMIN — VASOPRESSIN 0.25 UNITS: 20 INJECTION INTRAVENOUS at 06:01

## 2024-01-01 RX ADMIN — PANTOPRAZOLE SODIUM 40 MG: 40 TABLET, DELAYED RELEASE ORAL at 11:01

## 2024-01-01 RX ADMIN — SODIUM CHLORIDE 0.2 MCG/KG/MIN: 9 INJECTION, SOLUTION INTRAVENOUS at 02:01

## 2024-01-01 RX ADMIN — INSULIN ASPART 6 UNITS: 100 INJECTION, SOLUTION INTRAVENOUS; SUBCUTANEOUS at 05:01

## 2024-01-01 RX ADMIN — SACUBITRIL AND VALSARTAN 1 TABLET: 24; 26 TABLET, FILM COATED ORAL at 11:01

## 2024-01-01 RX ADMIN — ROCURONIUM BROMIDE 10 MG: 10 INJECTION INTRAVENOUS at 05:01

## 2024-01-01 RX ADMIN — CALCIUM CHLORIDE INJECTION 1 G: 100 INJECTION, SOLUTION INTRAVENOUS at 07:01

## 2024-01-01 RX ADMIN — ROCURONIUM BROMIDE 10 MG: 10 INJECTION INTRAVENOUS at 04:01

## 2024-01-01 RX ADMIN — NOREPINEPHRINE BITARTRATE 0.5 MCG/KG/MIN: 1 INJECTION, SOLUTION, CONCENTRATE INTRAVENOUS at 06:01

## 2024-01-01 RX ADMIN — ASPIRIN 81 MG CHEWABLE TABLET 81 MG: 81 TABLET CHEWABLE at 02:01

## 2024-01-01 RX ADMIN — CEFTAROLINE FOSAMIL 200 MG: 400 POWDER, FOR SOLUTION INTRAVENOUS at 12:01

## 2024-01-01 RX ADMIN — HEPARIN SODIUM AND DEXTROSE 23 UNITS/KG/HR: 10000; 5 INJECTION INTRAVENOUS at 06:01

## 2024-01-01 RX ADMIN — ETOMIDATE 8 MG: 2 INJECTION, SOLUTION INTRAVENOUS at 01:01

## 2024-01-01 RX ADMIN — ONDANSETRON 4 MG: 2 INJECTION INTRAMUSCULAR; INTRAVENOUS at 01:01

## 2024-01-01 RX ADMIN — FUROSEMIDE 80 MG: 80 TABLET ORAL at 12:01

## 2024-01-01 RX ADMIN — INSULIN ASPART 3 UNITS: 100 INJECTION, SOLUTION INTRAVENOUS; SUBCUTANEOUS at 05:01

## 2024-01-01 RX ADMIN — EPINEPHRINE 10 MCG: 0.1 INJECTION INTRAVENOUS at 06:01

## 2024-01-01 RX ADMIN — HEPARIN SODIUM AND DEXTROSE 23 UNITS/KG/HR: 10000; 5 INJECTION INTRAVENOUS at 09:01

## 2024-01-01 RX ADMIN — CALCIUM CHLORIDE INJECTION 1 G: 100 INJECTION, SOLUTION INTRAVENOUS at 06:01

## 2024-01-01 RX ADMIN — ASPIRIN 81 MG CHEWABLE TABLET 81 MG: 81 TABLET CHEWABLE at 01:01

## 2024-01-01 RX ADMIN — HEPARIN SODIUM: 5000 INJECTION INTRAVENOUS; SUBCUTANEOUS at 11:01

## 2024-01-01 RX ADMIN — INSULIN ASPART 4 UNITS: 100 INJECTION, SOLUTION INTRAVENOUS; SUBCUTANEOUS at 10:01

## 2024-01-01 RX ADMIN — MUPIROCIN: 20 OINTMENT TOPICAL at 01:01

## 2024-01-01 RX ADMIN — VASOPRESSIN 0.1 UNITS: 20 INJECTION INTRAVENOUS at 05:01

## 2024-01-01 RX ADMIN — CALCIUM ACETATE 667 MG: 667 CAPSULE ORAL at 01:01

## 2024-01-01 RX ADMIN — VASOPRESSIN 0.2 UNITS: 20 INJECTION INTRAVENOUS at 05:01

## 2024-01-01 RX ADMIN — HYDRALAZINE HYDROCHLORIDE 25 MG: 25 TABLET ORAL at 03:01

## 2024-01-01 RX ADMIN — FENTANYL CITRATE 25 MCG: 50 INJECTION, SOLUTION INTRAMUSCULAR; INTRAVENOUS at 05:01

## 2024-01-01 RX ADMIN — METOPROLOL SUCCINATE 50 MG: 50 TABLET, EXTENDED RELEASE ORAL at 01:01

## 2024-01-01 RX ADMIN — ACETAMINOPHEN 650 MG: 325 TABLET ORAL at 03:01

## 2024-01-01 RX ADMIN — VASOPRESSIN 1 UNITS: 20 INJECTION INTRAVENOUS at 05:01

## 2024-01-01 RX ADMIN — DEXAMETHASONE SODIUM PHOSPHATE 4 MG: 4 INJECTION, SOLUTION INTRAMUSCULAR; INTRAVENOUS at 04:01

## 2024-01-01 RX ADMIN — ALBUTEROL SULFATE 2.5 MG: 2.5 SOLUTION RESPIRATORY (INHALATION) at 03:01

## 2024-01-01 RX ADMIN — PROCHLORPERAZINE EDISYLATE 2.5 MG: 5 INJECTION INTRAMUSCULAR; INTRAVENOUS at 09:01

## 2024-01-01 RX ADMIN — FUROSEMIDE 80 MG: 80 TABLET ORAL at 11:01

## 2024-01-01 RX ADMIN — ROCURONIUM BROMIDE 20 MG: 10 INJECTION INTRAVENOUS at 02:01

## 2024-01-01 RX ADMIN — INSULIN ASPART 2 UNITS: 100 INJECTION, SOLUTION INTRAVENOUS; SUBCUTANEOUS at 08:01

## 2024-01-01 RX ADMIN — SODIUM CHLORIDE, SODIUM GLUCONATE, SODIUM ACETATE, POTASSIUM CHLORIDE, MAGNESIUM CHLORIDE, SODIUM PHOSPHATE, DIBASIC, AND POTASSIUM PHOSPHATE: .53; .5; .37; .037; .03; .012; .00082 INJECTION, SOLUTION INTRAVENOUS at 02:01

## 2024-01-01 RX ADMIN — ATORVASTATIN CALCIUM 40 MG: 40 TABLET, FILM COATED ORAL at 11:01

## 2024-01-01 RX ADMIN — HYDRALAZINE HYDROCHLORIDE 25 MG: 25 TABLET ORAL at 05:01

## 2024-01-01 RX ADMIN — HEPARIN SODIUM AND DEXTROSE 23 UNITS/KG/HR: 10000; 5 INJECTION INTRAVENOUS at 05:01

## 2024-01-01 RX ADMIN — PANTOPRAZOLE SODIUM 40 MG: 40 TABLET, DELAYED RELEASE ORAL at 12:01

## 2024-01-01 RX ADMIN — SODIUM BICARBONATE 50 MEQ: 84 INJECTION, SOLUTION INTRAVENOUS at 07:01

## 2024-01-01 RX ADMIN — ASPIRIN 81 MG CHEWABLE TABLET 81 MG: 81 TABLET CHEWABLE at 12:01

## 2024-01-01 RX ADMIN — ALBUTEROL SULFATE 2.5 MG: 2.5 SOLUTION RESPIRATORY (INHALATION) at 12:01

## 2024-01-01 RX ADMIN — INSULIN ASPART 2 UNITS: 100 INJECTION, SOLUTION INTRAVENOUS; SUBCUTANEOUS at 01:01

## 2024-01-01 RX ADMIN — INSULIN ASPART 6 UNITS: 100 INJECTION, SOLUTION INTRAVENOUS; SUBCUTANEOUS at 12:01

## 2024-01-01 RX ADMIN — MUPIROCIN: 20 OINTMENT TOPICAL at 11:01

## 2024-01-01 RX ADMIN — ERYTHROPOIETIN 7900 UNITS: 10000 INJECTION, SOLUTION INTRAVENOUS; SUBCUTANEOUS at 09:01

## 2024-01-01 RX ADMIN — FUROSEMIDE 80 MG: 80 TABLET ORAL at 02:01

## 2024-01-01 NOTE — ASSESSMENT & PLAN NOTE
BG goal 140-180    - Levemir (Insulin Detemir) 16 units BID (20% increase due to FBG above goal)   - Novolog (Insulin Aspart) 6 units (0.5 u/kg/day)   - Continue LDC (150/50) prn ac/hs for hyperglycemia given poor renal function   - BG checks AC/HS/0200   - Hypoglycemia protocol in place  - If blood glucose greater than 300, please ask patient not to eat food or drink anything other than water until correctional insulin has brought it back below 250    ** Please notify Endocrine for any change and/or advance in diet**  ** Please call Endocrine for any BG related issues **    Discharge Planning:   TBD. Please notify endocrinology prior to discharge.

## 2024-01-01 NOTE — ASSESSMENT & PLAN NOTE
#CRT-D in place  #Infection associated with cardiac device  58yoF with T1DM, ESRD on HD, Cad s/p PCI, iCM with prior EF 25-30% recovered to 55-60% with CRT-D in place (dual-coiled 2018 [employer] CRT-D), severe MR s/p MitraClip, and chronic thrombocytopenia is here as a transfer with bacteremia, vegetation involving her RV lead per outside JAVY. Unable to get good visualization with current imaging. Discussed with echo staff regarding need for further imaging lead extraction.     TTE with 0.7cmx1.8cm vegetation without evidence of TV involvement, similar on JAVY  Bcx MRSA +  HD per groin trialysis  Formal interrogation showed she is not device-dependent    Recommendations:  - Plan for lead extraction early this week  - CTS consulted for backup for procedure

## 2024-01-01 NOTE — ASSESSMENT & PLAN NOTE
Patient's anemia is currently uncontrolled. Has not received any PRBCs to date. Etiology likely d/t chronic disease due to Chronic Kidney Disease/ESRD  Current CBC reviewed-   Lab Results   Component Value Date    HGB 7.6 (L) 01/01/2024    HCT 23.9 (L) 01/01/2024     Monitor serial CBC and transfuse if patient becomes hemodynamically unstable, symptomatic or H/H drops below 7/21.  - continue epo

## 2024-01-01 NOTE — ASSESSMENT & PLAN NOTE
Creatine stable for now. BMP reviewed- noted Estimated Creatinine Clearance: 10.6 mL/min (A) (based on SCr of 6.3 mg/dL (H)). according to latest data. Based on current GFR, CKD stage is end stage.  Monitor UOP and serial BMP and adjust therapy as needed. Renally dose meds. Avoid nephrotoxic medications and procedures.    - continue calcium acetate

## 2024-01-01 NOTE — SUBJECTIVE & OBJECTIVE
Interval History: No complaints this am. No sob, chest pain, n/v, fevers, chills, night sweats, abd pain, diarrhea, constipation.    Objective:     Vital Signs (Most Recent):  Temp: 99.5 °F (37.5 °C) (01/01/24 1504)  Pulse: 101 (01/01/24 1509)  Resp: 20 (01/01/24 1504)  BP: (!) 144/65 (01/01/24 1504)  SpO2: 97 % (01/01/24 1504) Vital Signs (24h Range):  Temp:  [97.1 °F (36.2 °C)-100.2 °F (37.9 °C)] 99.5 °F (37.5 °C)  Pulse:  [] 101  Resp:  [16-20] 20  SpO2:  [92 %-98 %] 97 %  BP: (131-157)/(65-75) 144/65     Weight: 78.5 kg (173 lb)  Body mass index is 27.1 kg/m².    Intake/Output Summary (Last 24 hours) at 1/1/2024 1756  Last data filed at 1/1/2024 1400  Gross per 24 hour   Intake 342 ml   Output --   Net 342 ml           Physical Exam  Gen: in NAD, appears stated age  Neuro: AAOx3, motor, sensory, and strength grossly intact BL  HEENT: NTNC, EOMI, PERRL, MMM, nodular presence of the Rt IJ area  CVS: RRR, no m/r/g; S1/S2 auscultated with no S3 or S4; capillary refill < 2 sec  Resp: lungs CTAB, no w/r/r; no belabored breathing or accessory muscle use appreciated   Abd: BS+ in all 4 quadrants; NTND, soft to palpation; no organomegaly appreciated   Extrem: pulses full, equal, and regular over all 4 extremities; no UE edema, BL LE edema, Rt fem CVL    Significant Labs: All pertinent labs within the past 24 hours have been reviewed.  Blood Culture:   Recent Labs   Lab 01/01/24  0856   LABBLOO No Growth to date  No Growth to date     CBC:   Recent Labs   Lab 12/31/23  0410 01/01/24  0311   WBC 6.19 6.58   HGB 7.8* 7.6*   HCT 24.7* 23.9*   * 163     CMP:   Recent Labs   Lab 12/30/23  1834 12/31/23  0410 01/01/24  0310   * 137 136   K 4.0 4.0 4.7   CL 98 99 99   CO2 22* 24 23   * 221* 241*   BUN 15 18 28*   CREATININE 3.8* 4.4* 6.3*   CALCIUM 8.1* 8.5* 8.4*   PROT 6.2 5.9* 6.1   ALBUMIN 2.2* 2.1* 2.2*   BILITOT 0.3 0.3 0.3   ALKPHOS 59 53* 59   AST 10 10 12   ALT 6* 6* 9*   ANIONGAP 12 14 14  "    Respiratory Culture: No results for input(s): "GSRESP", "RESPIRATORYC" in the last 48 hours.  Troponin: No results for input(s): "TROPONINI", "TROPONINIHS" in the last 48 hours.    Significant Imaging: I have reviewed all pertinent imaging results/findings within the past 24 hours.    JAVY:     Left Ventricle: The left ventricle is normal in size. Increased ventricular mass. Increased wall thickness. There is concentric hypertrophy. Normal wall motion. There is normal systolic function with a visually estimated ejection fraction of 60 - 65%. Grade I diastolic dysfunction.    Right Ventricle: Normal right ventricular cavity size. Systolic function is normal. Pacemaker lead present in the ventricle with a 0.7 x 1.8 cm mobile, echogenic structure consistent with a vegetation.    Left Atrium: Left atrium is moderately dilated.    Right Atrium: Right atrium is mildly dilated.    Mitral Valve: The mitral valve is repaired by MitraClip. There is mild regurgitation.    Tricuspid Valve: The tricuspid valve is structurally normal. There is moderate regurgitation. The vegetation on the ventricular lead appears adjacent to but not adherent to the septal leaflet.    Pulmonary Artery: There is pulmonary hypertension. The estimated pulmonary artery systolic pressure is 76 mmHg.    IVC/SVC: Elevated venous pressure at 15 mmHg.    Pericardium: There is a trivial posterior effusion. No indication of cardiac tamponade.  "

## 2024-01-01 NOTE — PROGRESS NOTES
[facility] - [facility]iology Stepdown  Endocrinology  Progress Note    Admit Date: 12/28/2023     Reason for Consult: Management of VANESSA (managed as T1DM), Hyperglycemia     Diabetes diagnosis year: 2008 (likely had issues with decreasing pancreatic function prior)    Home Diabetes Medications:  Lantus 30 units nightly; Humalog 5 units with meals + SSI (ISF 1:10)    How often checking glucose at home? >4 x day (Dexcom G6)  BG readings on regimen: 100-150's  Hypoglycemia on the regimen?  No  Missed doses on regimen?  No    Diabetes Complications include:     Hyperglycemia, Hypoglycemia , Hypoglycemia unawareness, Diabetic chronic kidney disease     , Diabetic retinopathy , Diabetic cataract , and Diabetic peripheral neuropathy     Complicating diabetes co morbidities:   HTN; HLD; CAD; ESRD on HD      HPI: Ellen Roman is a 57 yo female with hx of DM1, HTN, HLD, CAD s/p PCI with AICD placement, Mitral Regurgitation s/p clip, and ESRD on iHD MWF who presents as a transfer from OSH for higher level of care.  According to records, patient initially presented to OSH on 12/18 with chief complaint of R sided chest pain associated with N/V x 2 days with low grade temp.  Cardiology evaluated there and performed a LCH on 12/20 but no PCI was performed and they recommended medical management for her CAD.  Her BC grew MRSA bacteremia that remained positive during the admission with source suspected to be from her RIJ Tunneled dialysis catheter. The TDC was removed and IR was consulted for tempoary line placement and she was found to have a thrombosed LIJ from her previous catheter and a temporary catheter had to be placed to the R femoral vein.  Her BC continued to remain positive despite treatment with Vancomycin and a JAVY was ordered with findings of vegetations to the AICD lead wire and [place] [facility]. Endocrine consulted to manage type 1 diabetes. Of note, chart has mixed information  "regarding T1DM vs. T2DM. Suppressed C-peptide noted at [facility], but cannot find antibody labs to confirm VANESSA vs. Pancreatic DM.      Lab Results   Component Value Date    HGBA1C 8.0 (H) 2023             Interval HPI:   No acute events overnight. Patient in room 322/322 A. Blood glucose stable. BG at and above goal on current insulin regimen (SSI, prandial, and basal insulin ). Steroid use- None.   3 Days Post-Op  Renal function- Abnormal - 6.3 cr    Vasopressors-  None     Diet diabetic Cardiac (Low Na/Chol); 2000 Calorie; Fluid - 1500mL     Eatin%  Nausea: No  Hypoglycemia and intervention: No  Fever: No  TPN and/or TF: No    BP (!) 152/69 (BP Location: Right arm, Patient Position: Lying)   Pulse 94   Temp 100.2 °F (37.9 °C) (Oral)   Resp 18   Ht 5' 7" (1.702 m)   Wt 78.5 kg (173 lb)   LMP  (LMP Unknown)   SpO2 98%   BMI 27.10 kg/m²     Labs Reviewed and Include    Recent Labs   Lab 24  0310   *   CALCIUM 8.4*   ALBUMIN 2.2*   PROT 6.1      K 4.7   CO2 23   CL 99   BUN 28*   CREATININE 6.3*   ALKPHOS 59   ALT 9*   AST 12   BILITOT 0.3     Lab Results   Component Value Date    WBC 6.58 2024    HGB 7.6 (L) 2024    HCT 23.9 (L) 2024    MCV 92 2024     2024     No results for input(s): "TSH", "FREET4" in the last 168 hours.  Lab Results   Component Value Date    HGBA1C 8.0 (H) 2023       Nutritional status:   Body mass index is 27.1 kg/m².  Lab Results   Component Value Date    ALBUMIN 2.2 (L) 2024    ALBUMIN 2.1 (L) 2023    ALBUMIN 2.2 (L) 2023     No results found for: "PREALBUMIN"    Estimated Creatinine Clearance: 10.6 mL/min (A) (based on SCr of 6.3 mg/dL (H)).    Accu-Checks  Recent Labs     23  1222 23  1708 23  1848 23  2013 23  2105 23  2325 23  1319 23  1657 23  2040 24  0758   POCTGLUCOSE 205* 404* 436* 476* 358* 234* " 163* 161* 192* 223*       Current Medications and/or Treatments Impacting Glycemic Control  Immunotherapy:    Immunosuppressants       None          Steroids:   Hormones (From admission, onward)      Start     Stop Route Frequency Ordered    12/29/23 0020  melatonin tablet 3 mg         -- Oral Nightly PRN 12/28/23 2331          Pressors:    Autonomic Drugs (From admission, onward)      None          Hyperglycemia/Diabetes Medications:   Antihyperglycemics (From admission, onward)      Start     Stop Route Frequency Ordered    12/31/23 1200  insulin aspart U-100 pen 0-5 Units         -- SubQ Before meals, nightly and at 0200 PRN 12/31/23 1151    12/31/23 0900  insulin detemir U-100 (Levemir) pen 14 Units         -- SubQ 2 times daily 12/31/23 0742    12/31/23 0745  insulin aspart U-100 pen 6 Units         -- SubQ 3 times daily with meals 12/31/23 0744            ASSESSMENT and PLAN    Renal/  ESRD (end stage renal disease)  Titrate insulin slowly to avoid hypoglycemia as the risk of hypoglycemia increases with decreased creatinine clearance.      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), managed as type 1  BG goal 140-180    - Levemir (Insulin Detemir) 16 units BID (20% increase due to FBG above goal)   - Novolog (Insulin Aspart) 6 units (0.5 u/kg/day)   - Continue LDC (150/50) prn ac/hs/0200 for hyperglycemia given poor renal function   - BG checks AC/HS/0200   - Hypoglycemia protocol in place  - If blood glucose greater than 300, please ask patient not to eat food or drink anything other than water until correctional insulin has brought it back below 250    ** Please notify Endocrine for any change and/or advance in diet**  ** Please call Endocrine for any BG related issues **    Discharge Planning:   TBD. Please notify endocrinology prior to discharge.        Carissa  LINDA Rosas  Endocrinology  [facility] - Cardiology Stepdown

## 2024-01-01 NOTE — PROGRESS NOTES
[facility] - Cardiology [facility] Medicine  Progress Note    Patient Name: Ellen Roman  MRN: [id number]  Patient Class: IP- Inpatient   Admission Date: 12/28/2023  Length of Stay: 4 days  Attending Physician: Yamilet Boyce MD  Primary Care Provider: Paco Amanda MD        Subjective:     Principal Problem:MRSA bacteremia        HPI:  Ellen Roman is 58 year old female with PMHx significant for T1DM c/b gastroparesis, retinopathy and nephropathy - ESRD on HD, HTN, HLD, CAD s/p prior PCI with ischemic cardiomyopathy s/p AICD placement, chronic thrombocytopenia, severe mitral regurgitation status post MitraClip, history of fungemia/bacteremia initially presenting to [facility] ER on 12/18 with a complaints of right-sided chest pain associated with nausea and vomiting multiple episodes for the prior 2 days.     She also reported low-grade temperature. Patient reports generalized weakness. Cardiology performed LHC on 12/20/2023 for evaluation of this chest pain with recommendation for medical management for coronary artery disease. Course is then complicated by refractory MRSA bacteremia. HM team initially suspected source of bacteremia was a tunneled dialysis catheter. This was removed upon admission and patient had placement of left IJ. The placement of left IJ trialysis catheter was c/b IJ thrombosis for which she is started on provoked DVT management - currently with lovenox though with ESRD would consider transition to heparin. IR had difficulty placing the right IJ due to thrombosis as well and access subsequently was placed on the right femoral. Due to persistent bacteremia despite vancomycin, patient subsequently had JAVY 12/27/23 with findings reported as positive for vegetations present on the ICD lead wire. As per OS cardiologist, [place] [facility] [place] [facility] [place] CRT-D [employer].     Current medications:  aspirin 81 mg Oral Daily   atorvastatin 40 mg Oral Nightly   cefepime 1 g  Intravenous Q24H   DAPTOmycin 6 mg/kg Intravenous Q48H since 12/26  enoxaparin 1 mg/kg (Ideal) Subcutaneous Nightly   epoetin john 100 Units/kg Subcutaneous Once per day on Tuesday Thursday Saturday   hydrALAZINE 25 mg Oral TID   insulin glargine 10 Units Subcutaneous Nightly   insulin lispro 0-10 Units Subcutaneous AC & HS   ipratropium-albuteroL 3 mL Nebulization Q6H   metoprolol succinate XL 50 mg Oral Daily   pantoprazole 40 mg Oral QAM   pregabalin 50 mg Oral Daily since 12/27  rifAMPin 300 mg Oral Daily   sacubitriL-valsartan 1 tablet Oral BID   vancomycin 15 mg/kg Intravenous since 12/18    Overview/Hospital Course:  ID, Nephro, EP consulted on admission. JAVY performed 12/29. TTE 12/29:       Left Ventricle: The left ventricle is normal in size. Increased ventricular mass. Increased wall thickness. There is concentric hypertrophy. Normal wall motion. There is normal systolic function with a visually estimated ejection fraction of 60 - 65%. Grade I diastolic dysfunction.    Right Ventricle: Normal right ventricular cavity size. Systolic function is normal. Pacemaker lead present in the ventricle with a 0.7 x 1.8 cm mobile, echogenic structure consistent with a vegetation.    Left Atrium: Left atrium is moderately dilated.    Right Atrium: Right atrium is mildly dilated.    Mitral Valve: The mitral valve is repaired by MitraClip. There is mild regurgitation.    Tricuspid Valve: The tricuspid valve is structurally normal. There is moderate regurgitation. The vegetation on the ventricular lead appears adjacent to but not adherent to the septal leaflet.    Pulmonary Artery: There is pulmonary hypertension. The estimated pulmonary artery systolic pressure is 76 mmHg.    IVC/SVC: Elevated venous pressure at 15 mmHg.    Pericardium: There is a trivial posterior effusion. No indication of cardiac tamponade.    ID changed antibiotics to cefepime and dapto. Endocrine consulted for diabetes management. Nephro consulted  for HD needs. Lasix increased to 80mg qday. Patient was continued on heparin gtt for thrombosed IJ. JAVY w/RV pacer lead w/0.7 x 1.8 cm mobile, echogenic structure consistent with a vegetation. Did a vanc lock for the Rt fem CVL 12/30. Repeat blood cultures obtained 12/30 due to Bcx 12/29 +iv for MRSA. Had issues with hyperglycemia- insulin regimen modified.     Interval History: No complaints this am. No sob, chest pain, n/v, fevers, chills, night sweats, abd pain, diarrhea, constipation.    Objective:     Vital Signs (Most Recent):  Temp: 99.5 °F (37.5 °C) (01/01/24 1504)  Pulse: 101 (01/01/24 1509)  Resp: 20 (01/01/24 1504)  BP: (!) 144/65 (01/01/24 1504)  SpO2: 97 % (01/01/24 1504) Vital Signs (24h Range):  Temp:  [97.1 °F (36.2 °C)-100.2 °F (37.9 °C)] 99.5 °F (37.5 °C)  Pulse:  [] 101  Resp:  [16-20] 20  SpO2:  [92 %-98 %] 97 %  BP: (131-157)/(65-75) 144/65     Weight: 78.5 kg (173 lb)  Body mass index is 27.1 kg/m².    Intake/Output Summary (Last 24 hours) at 1/1/2024 1756  Last data filed at 1/1/2024 1400  Gross per 24 hour   Intake 342 ml   Output --   Net 342 ml           Physical Exam  Gen: in NAD, appears stated age  Neuro: AAOx3, motor, sensory, and strength grossly intact BL  HEENT: NTNC, EOMI, PERRL, MMM, nodular presence of the Rt IJ area  CVS: RRR, no m/r/g; S1/S2 auscultated with no S3 or S4; capillary refill < 2 sec  Resp: lungs CTAB, no w/r/r; no belabored breathing or accessory muscle use appreciated   Abd: BS+ in all 4 quadrants; NTND, soft to palpation; no organomegaly appreciated   Extrem: pulses full, equal, and regular over all 4 extremities; no UE edema, BL LE edema, Rt fem CVL    Significant Labs: All pertinent labs within the past 24 hours have been reviewed.  Blood Culture:   Recent Labs   Lab 01/01/24  0856   LABBLOO No Growth to date  No Growth to date     CBC:   Recent Labs   Lab 12/31/23  0410 01/01/24  0311   WBC 6.19 6.58   HGB 7.8* 7.6*   HCT 24.7* 23.9*   * 163  "    CMP:   Recent Labs   Lab 12/30/23  1834 12/31/23  0410 01/01/24  0310   * 137 136   K 4.0 4.0 4.7   CL 98 99 99   CO2 22* 24 23   * 221* 241*   BUN 15 18 28*   CREATININE 3.8* 4.4* 6.3*   CALCIUM 8.1* 8.5* 8.4*   PROT 6.2 5.9* 6.1   ALBUMIN 2.2* 2.1* 2.2*   BILITOT 0.3 0.3 0.3   ALKPHOS 59 53* 59   AST 10 10 12   ALT 6* 6* 9*   ANIONGAP 12 14 14     Respiratory Culture: No results for input(s): "GSRESP", "RESPIRATORYC" in the last 48 hours.  Troponin: No results for input(s): "TROPONINI", "TROPONINIHS" in the last 48 hours.    Significant Imaging: I have reviewed all pertinent imaging results/findings within the past 24 hours.    JAVY:     Left Ventricle: The left ventricle is normal in size. Increased ventricular mass. Increased wall thickness. There is concentric hypertrophy. Normal wall motion. There is normal systolic function with a visually estimated ejection fraction of 60 - 65%. Grade I diastolic dysfunction.    Right Ventricle: Normal right ventricular cavity size. Systolic function is normal. Pacemaker lead present in the ventricle with a 0.7 x 1.8 cm mobile, echogenic structure consistent with a vegetation.    Left Atrium: Left atrium is moderately dilated.    Right Atrium: Right atrium is mildly dilated.    Mitral Valve: The mitral valve is repaired by MitraClip. There is mild regurgitation.    Tricuspid Valve: The tricuspid valve is structurally normal. There is moderate regurgitation. The vegetation on the ventricular lead appears adjacent to but not adherent to the septal leaflet.    Pulmonary Artery: There is pulmonary hypertension. The estimated pulmonary artery systolic pressure is 76 mmHg.    IVC/SVC: Elevated venous pressure at 15 mmHg.    Pericardium: There is a trivial posterior effusion. No indication of cardiac tamponade.    Assessment/Plan:      * MRSA bacteremia  - MRSA on OSH Bcx since 12/18. 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  Last A1c reviewed-   Lab Results   Component Value Date    HGBA1C 8.0 (H) 12/19/2023     Most recent fingerstick glucose reviewed-   Recent Labs   Lab 12/31/23  2040 01/01/24  0758 01/01/24  1114 01/01/24  1502   POCTGLUCOSE 192* 223* 251* 206*       Current correctional scale  Medium  Maintain anti-hyperglycemic dose as follows-   Antihyperglycemics (From admission, onward)      Start     Stop Route Frequency Ordered    01/01/24 2100  insulin detemir U-100 (Levemir) pen 16 Units         -- SubQ 2 times daily 01/01/24 1223    12/31/23 1200  insulin aspart U-100 pen 0-5 Units         -- SubQ Before meals, nightly and at 0200 PRN 12/31/23 1151    12/31/23 0745  insulin aspart U-100 pen 6 Units         -- SubQ 3 times daily with meals 12/31/23 0744          Hold Oral hypoglycemics while patient is in the hospital.    Infection associated with cardiac device  - see MRSA bacteremia       Chronic kidney disease-mineral and bone disorder  Creatine stable for now. BMP reviewed- noted Estimated Creatinine Clearance: 10.6 mL/min (A) (based on SCr of 6.3 mg/dL (H)). according to latest data. Based on current GFR, CKD stage is end stage.  Monitor UOP and serial BMP and adjust therapy as needed. Renally dose meds. Avoid nephrotoxic medications and procedures.    - continue calcium acetate    Anemia due to stage 5 chronic kidney disease, not on  chronic dialysis  Patient's anemia is currently uncontrolled. Has not received any PRBCs to date. Etiology likely d/t chronic disease due to Chronic Kidney Disease/ESRD  Current CBC reviewed-   Lab Results   Component Value Date    HGB 7.6 (L) 01/01/2024    HCT 23.9 (L) 01/01/2024     Monitor serial CBC and transfuse if patient becomes hemodynamically unstable, symptomatic or H/H drops below 7/21.  - continue epo    ESRD (end stage renal disease)  - ESRD with trialysis line removed.   - Nephrology following- Rt fem CVL- will discuss with nephro and ID about more permanent HD needs- will need clearance of blood cultures and ICD removal first    Cardiac resynchronization therapy defibrillator (CRT-D) in place  - [employer] CRT-D implanted 2018      Nonrheumatic mitral valve regurgitation  - S/p mitraclip       Coronary artery disease involving native coronary artery of native heart without angina pectoris  - S/P PCI D2 with SHU x 1 (07/13/2016)  - S/P PCI OM1 with SHU x 1 (01/04/2017)  - S/P PCI prox D2 with SHU x 1 (03/07/2017)  - Patent stents with [facility] by [facility] 12/10/2019  - S/P C with RCA 90 % stenosis, SHU x 3 (4/5/2022)    Most recent C during admission. Continue aspirin, heparin drip.      VTE Risk Mitigation (From admission, onward)           Ordered     vancomycin (VANCOCIN) 4 mg, heparin (porcine) 5,000 Units IV catheter lock (total volume 2 mL)  Daily         01/01/24 0828     heparin (porcine) injection 1,000 Units  As needed (PRN)         12/30/23 1102     heparin 25,000 units in dextrose 5% (100 units/ml) IV bolus from bag - ADDITIONAL PRN BOLUS - 60 units/kg  As needed (PRN)        Question:  Heparin Infusion Adjustment (DO NOT MODIFY ANSWER)  Answer:  [email or web address]\Images\Pharmacy\HeparinInfusions\heparin HIGH INTENSITY nomogram for [facility] [id number]    12/29/23 0901     heparin 25,000 units in dextrose 5% (100 units/ml) IV bolus from bag - ADDITIONAL PRN BOLUS - 30 units/kg  As needed  (PRN)        Question:  Heparin Infusion Adjustment (DO NOT MODIFY ANSWER)  Answer:  [email or web address]\Images\Pharmacy\HeparinInfusions\heparin HIGH INTENSITY nomogram for [facility] [id number]    12/29/23 0901     heparin 25,000 units in dextrose 5% 250 mL (100 units/mL) infusion HIGH INTENSITY nomogram - [facility]  Continuous        Question:  Begin at (units/kg/hr)  Answer:  18    12/29/23 0901     heparin (porcine) injection 3,000 Units  Daily PRN         12/28/23 2331                    Discharge Planning   VIRA: 1/2/2024     Code Status: Full Code   Is the patient medically ready for discharge?: No    Reason for patient still in hospital (select all that apply): Patient trending condition  Discharge Plan A: Home with family, Home Health                    Yamilet Boyce MD  Department of Hospital Medicine   [facility] - Cardiology Stepdown

## 2024-01-01 NOTE — PROGRESS NOTES
[facility] - Cardiology Stepdown  Infectious Disease  Progress Note    Patient Name: Ellen Roman  MRN: [id number]  Admission Date: 12/28/2023  Length of Stay: 4 days  Attending Physician: Yamilet Boyce MD  Primary Care Provider: Paco Amanda MD    Isolation Status: No active isolations  Assessment/Plan:      ID  * MRSA bacteremia  Secondary to AICD infection.   Management as in AICD infection.  Pending EP management.     Infection associated with cardiac device  AICD lead vegetation with associated bacteremia at the referring hospital. Blood cultures on 12/8 with C perfringes and MRSA. Repeat blood cultures 12/18-27 with MRSA. Patient is afebrile and without leukocytosis. Repeat blood cultures collected in [facility] on 12/30 still positive. JAVY with vegetation per discussion with teams.     Recommendations  Continue daptomycin and ceftaroline  If unable to clear bacteremia post lead extraction then may need to consider femoral catheter removal or exchange.  Follow up repeat blood cultures          Anticipated Disposition: TBD    Thank you for your consult. I will follow-up with patient. Please contact us if you have any additional questions.    Leigh Ann Harris,   Infectious Disease  [facility] - Cardiology Stepdown    Subjective:     Principal Problem:MRSA bacteremia    HPI: A 58-year-old woman with DM1, diabetic gastroparesis, diabetic retinopathy, diabetic nephropathy, ESRD on HD (MWF), CAD, HTN, s/p AICD, s/p mitral clip due to severe MR, chronic thrombocytopenia, and recent C tropicalis fungemia with positive HD catheter for K pneumoniae (October) [facility] in the setting of refractory MRSA bacteremia and ACID lead vegetation.     Of note, Mrs. Roman was seen in [facility] ED on 12/18 at which time samples were collected for culture and she was subsequently discharged Blood cultures collected on 12/8 became positive for MRSA and C perfringens. She  "returned to [facility] on 12/18 at which time she was admitted for further management. Cultures collected from 12/18-12/27 are positive for MRSA. During her admission at [facility] she was diagnosed and treated for left IJ DVT, LUE cellulitis, DM1. Work up also revealed evidence of a large vegetation attached to the AICD lead across the TV.     Infectious Diseases consulted for "Bacteremia with concern for ICD lead infection. Transferred on daptomycin and cefepime. "    Interval History:     States she is doing ok. Denies any new back or joint pains.     Review of Systems   Constitutional:  Negative for chills and fever.   Respiratory:  Negative for cough and shortness of breath.    Cardiovascular:  Negative for chest pain.   Gastrointestinal:  Negative for abdominal pain, constipation, diarrhea, nausea and vomiting.   Musculoskeletal:  Negative for arthralgias and myalgias.   Skin:  Negative for rash.   Neurological:  Negative for headaches.     Objective:     Vital Signs (Most Recent):  Temp: 100 °F (37.8 °C) (01/01/24 1116)  Pulse: 93 (01/01/24 1127)  Resp: 20 (01/01/24 1116)  BP: (!) 157/75 (01/01/24 1116)  SpO2: 98 % (01/01/24 1116) Vital Signs (24h Range):  Temp:  [97.1 °F (36.2 °C)-100.2 °F (37.9 °C)] 100 °F (37.8 °C)  Pulse:  [73-98] 93  Resp:  [16-20] 20  SpO2:  [92 %-98 %] 98 %  BP: (130-157)/(60-75) 157/75     Weight: 78.5 kg (173 lb)  Body mass index is 27.1 kg/m².    Estimated Creatinine Clearance: 10.6 mL/min (A) (based on SCr of 6.3 mg/dL (H)).     Physical Exam  Vitals reviewed.   Constitutional:       General: She is not in acute distress.     Appearance: Normal appearance. She is not ill-appearing.   HENT:      Head: Normocephalic and atraumatic.   Eyes:      Extraocular Movements: Extraocular movements intact.      Conjunctiva/sclera: Conjunctivae normal.   Cardiovascular:      Rate and Rhythm: Normal rate and regular rhythm.   Pulmonary:      Effort: Pulmonary effort is normal. No respiratory distress.      " Breath sounds: Normal breath sounds. No wheezing.   Abdominal:      General: Abdomen is flat. Bowel sounds are normal.      Palpations: Abdomen is soft.      Tenderness: There is no abdominal tenderness.   Musculoskeletal:      Cervical back: Normal range of motion.   Skin:     General: Skin is warm and dry.   Neurological:      Mental Status: She is alert and oriented to person, place, and time.   Psychiatric:         Mood and Affect: Mood normal.         Behavior: Behavior normal.         Thought Content: Thought content normal.          Significant Labs: All pertinent labs within the past 24 hours have been reviewed.  Recent Lab Results  (Last 5 results in the past 24 hours)        01/01/24  1114   01/01/24  0758   01/01/24  0311   01/01/24  0310   12/31/23  2040        Albumin       2.2         ALP       59         ALT       9         Anion Gap       14         aPTT       54.4  Comment: Refer to local heparin nomogram for intensity/dose specific   therapeutic   range.           AST       12         Baso #     0.04           Basophil %     0.6           BILIRUBIN TOTAL       0.3  Comment: For infants and newborns, interpretation of results should be based  on gestational age, weight and in agreement with clinical  observations.    Premature Infant recommended reference ranges:  Up to 24 hours.............<8.0 mg/dL  Up to 48 hours............<12.0 mg/dL  3-5 days..................<15.0 mg/dL  6-29 days.................<15.0 mg/dL           BUN       28         Calcium       8.4         Chloride       99         CO2       23         Creatinine       6.3         Differential Method     Automated           eGFR       7.2         Eos #     0.2           Eosinophil %     3.5           Glucose       241         Gran # (ANC)     4.6           Gran %     70.3           Hematocrit     23.9           Hemoglobin     7.6           Immature Grans (Abs)     0.07  Comment: Mild elevation in immature granulocytes is non specific  and   can be seen in a variety of conditions including stress response,   acute inflammation, trauma and pregnancy. Correlation with other   laboratory and clinical findings is essential.             Immature Granulocytes     1.1           Lymph #     1.1           Lymph %     16.1           Magnesium        1.8         MCH     29.2           MCHC     31.8           MCV     92           Mono #     0.6           Mono %     8.4           MPV     11.0           nRBC     0           Platelet Count     163           POCT Glucose 251   223       192       Potassium       4.7         PROTEIN TOTAL       6.1         RBC     2.60           RDW     16.4           Sodium       136         WBC     6.58                                  Significant Imaging: I have reviewed all pertinent imaging results/findings within the past 24 hours.

## 2024-01-01 NOTE — ASSESSMENT & PLAN NOTE
- MRSA on OSH Bcx since 12/18. On daptomycin 6mg/kg Q48hr and cefepime at OSH  - Trialysis line removed due to concern for source  - 12/27 JAVY concern for vegetation reported  - ID consulted- continue dapto 10mg/kg- stopped cefepime- begin ceftaroline  - Bcx 12/29- 2/4 +iv MRSA  - Bcx 12/30- 2/4 +iv MRSA  - Repeat Bcx today  - received vanc antibiotic lock yesterday, 12/30  - EP consulted- JAVY performed w/RV lead vegetation- will need to undergo extraction and then possible re-implantation of leadless pacer after clearance of blood cultures -CTS consulted as well

## 2024-01-01 NOTE — PROGRESS NOTES
[facility] - Cardiology Stepdown  Cardiac Electrophysiology  Progress Note    Admission Date: 12/28/2023  Code Status: Full Code   Attending Physician: Yamilet Boyce MD   Expected Discharge Date: 1/2/2024  Principal Problem:MRSA bacteremia    Subjective:     Interval History: No acute issues, JAVY showed a vegetation in the right atrium.       Current Facility-Administered Medications:     albuterol sulfate nebulizer solution 2.5 mg, 2.5 mg, Nebulization, Q4H, Kathryn More PA-C, 2.5 mg at 01/01/24 0436    albuterol-ipratropium 2.5 mg-0.5 mg/3 mL nebulizer solution 3 mL, 3 mL, Nebulization, Q4H PRN, Kathryn More PA-C    aspirin chewable tablet 81 mg, 81 mg, Oral, Daily, Bhupendra Kumar MD, 81 mg at 12/31/23 0843    atorvastatin tablet 40 mg, 40 mg, Oral, Daily, Bhupendra Kumar MD, 40 mg at 12/31/23 0843    benzonatate capsule 200 mg, 200 mg, Oral, Q8H PRN, Bhupendra Kumar MD    calcium acetate(phosphat bind) capsule 667 mg, 667 mg, Oral, TID WM, Bhupendra Kumar MD, 667 mg at 12/31/23 1724    ceftaroline fosamiL (TEFLARO) 200 mg in dextrose 5 % (D5W) 50 mL IVPB, 200 mg, Intravenous, Q12H, Leigh Ann Harris DO, Stopped at 01/01/24 0623    DAPTOmycin (CUBICIN) 785 mg in sodium chloride 0.9% SolP 50 mL IVPB, 10 mg/kg, Intravenous, Q48H, Karen Ramirez MD, Stopped at 12/31/23 1030    dextrose 50 % in water (D50W) injection 50 mL, 50 mL, Intravenous, PRN, Bhupendra Kumar MD, 50 mL at 12/29/23 2021    docusate sodium capsule 50 mg, 50 mg, Oral, Daily, Yamilet Boyce MD, 50 mg at 12/31/23 0843    epoetin john injection 7,900 Units, 100 Units/kg, Intravenous, Every Tues, Thurs, Sat, Jono Lamas, NP, 7,900 Units at 12/30/23 1102    furosemide tablet 80 mg, 80 mg, Oral, Daily, Yamilet Boyce MD, 80 mg at 12/31/23 0843    glucagon (human recombinant) injection 1 mg, 1 mg, Intramuscular, PRN, Bhupendra Kumar MD    glucose chewable tablet 16 g, 16 g, Oral, PRN, Pantlin, Peter  MD CHERYL    glucose chewable tablet 24 g, 24 g, Oral, PRN, Bhupendra Kumar MD    heparin (porcine) injection 1,000 Units, 1,000 Units, Intravenous, PRN, Jono Lamas NP, 1,000 Units at 12/30/23 1136    heparin (porcine) injection 3,000 Units, 3,000 Units, Intra-Catheter, Daily PRN, Bhupendra Kumar MD    heparin 25,000 units in dextrose 5% (100 units/ml) IV bolus from bag - ADDITIONAL PRN BOLUS - 30 units/kg, 30 Units/kg (Adjusted), Intravenous, PRN, Yamilet Boyce MD, 2,052 Units at 12/31/23 0602    heparin 25,000 units in dextrose 5% (100 units/ml) IV bolus from bag - ADDITIONAL PRN BOLUS - 60 units/kg, 60 Units/kg (Adjusted), Intravenous, PRN, Yamilet Boyce MD, 4,104 Units at 12/29/23 1706    heparin 25,000 units in dextrose 5% 250 mL (100 units/mL) infusion HIGH INTENSITY nomogram - [facility], 0-40 Units/kg/hr (Adjusted), Intravenous, Continuous, Yamilet Boyce MD, Last Rate: 15.7 mL/hr at 01/01/24 0604, 23 Units/kg/hr at 01/01/24 0604    hydrALAZINE injection 10 mg, 10 mg, Intravenous, Q8H PRN, Bhupendra Kumar MD    hydrALAZINE tablet 25 mg, 25 mg, Oral, Q8H, Bhupendra Kumar MD, 25 mg at 01/01/24 0156    insulin aspart U-100 pen 0-5 Units, 0-5 Units, Subcutaneous, AC + HS + 0200 PRN, Carissa Rosas PA-C    insulin aspart U-100 pen 6 Units, 6 Units, Subcutaneous, TIDWM, Carissa oRsas PA-C, 6 Units at 12/31/23 1736    insulin detemir U-100 (Levemir) pen 14 Units, 14 Units, Subcutaneous, BID, Carissa Rosas PA-C, 14 Units at 12/31/23 2044    melatonin tablet 3 mg, 3 mg, Oral, Nightly PRN, Bhupendra Kumar MD    metoprolol succinate (TOPROL-XL) 24 hr tablet 50 mg, 50 mg, Oral, Daily, Bhupendra Kumar MD, 50 mg at 12/31/23 0843    pantoprazole EC tablet 40 mg, 40 mg, Oral, Daily, Bhupendra Kumar MD, 40 mg at 12/31/23 0843    pregabalin capsule 50 mg, 50 mg, Oral, Daily, Bhupendra Kumar MD, 50 mg at 12/31/23 1746    promethazine tablet 25 mg, 25 mg, Oral, Q6H PRN, José  Bhupendra LUNA MD, 25 mg at 12/30/23 1739    sacubitriL-valsartan 24-26 mg per tablet 1 tablet, 1 tablet, Oral, BID, Pantrandy, Bhupendra LUNA MD, 1 tablet at 12/31/23 2045    sodium chloride 0.9% flush 10 mL, 10 mL, Intravenous, PRN, Levar Olsen MD    vancomycin (VANCOCIN) 4 mg, heparin (porcine) 5,000 Units IV catheter lock (total volume 2 mL), , Intra-Catheter, Once, Yamilet Boyce MD    Facility-Administered Medications Ordered in Other Encounters:     0.9%  NaCl infusion (for blood administration), , Intravenous, Q24H PRN, Manav Schneider MD    0.9%  NaCl infusion (for blood administration), , Intravenous, Q24H PRN, Manav Schneider MD    benzocaine 20 % oral spray, , Mouth/Throat, QID PRN, Leslie Fishman MD    sodium chloride 0.9% flush 10 mL, 10 mL, Intravenous, PRN, Manav Schneider MD    albuterol-ipratropium, benzonatate, dextrose 50 % in water (D50W), glucagon (human recombinant), glucose, glucose, heparin (porcine), heparin (porcine), heparin (PORCINE), heparin (PORCINE), hydrALAZINE, insulin aspart U-100, melatonin, promethazine, sodium chloride 0.9%      Review of Systems   Constitutional: Negative for diaphoresis and fever.   Cardiovascular:  Negative for chest pain, dyspnea on exertion, leg swelling, near-syncope, orthopnea, palpitations, paroxysmal nocturnal dyspnea and syncope.   Respiratory:  Negative for cough and shortness of breath.    Gastrointestinal:  Negative for abdominal pain, diarrhea, nausea and vomiting.   Neurological:  Negative for light-headedness.   Psychiatric/Behavioral:  Negative for altered mental status and substance abuse.      Objective:     Vital Signs (Most Recent):  Temp: 97.1 °F (36.2 °C) (01/01/24 0453)  Pulse: 91 (01/01/24 0453)  Resp: 19 (01/01/24 0453)  BP: 135/69 (01/01/24 0453)  SpO2: (!) 92 % (01/01/24 0453) Vital Signs (24h Range):  Temp:  [97.1 °F (36.2 °C)-99.2 °F (37.3 °C)] 97.1 °F (36.2 °C)  Pulse:  [73-98] 91  Resp:  [16-20] 19  SpO2:  [92 %-98 %]  92 %  BP: (130-149)/(60-71) 135/69     Weight: 78.5 kg (173 lb)  Body mass index is 27.1 kg/m².     SpO2: (!) 92 %     Telemetry: Normal sinus rhythm (90?), PVCs overnight.       Physical Exam  Constitutional:       Appearance: Normal appearance.   Cardiovascular:      Rate and Rhythm: Normal rate and regular rhythm.      Pulses: Normal pulses.      Heart sounds: Normal heart sounds.   Pulmonary:      Effort: Pulmonary effort is normal.      Breath sounds: Normal breath sounds. No wheezing or rales.   Abdominal:      General: Abdomen is flat. There is no distension.      Palpations: Abdomen is soft.      Tenderness: There is no abdominal tenderness.   Musculoskeletal:      Right lower leg: No edema.      Left lower leg: No edema.   Skin:     General: Skin is warm and dry.   Neurological:      Mental Status: She is alert and oriented to person, place, and time. Mental status is at baseline.   Psychiatric:         Mood and Affect: Mood normal.         Behavior: Behavior normal.            Significant Labs:     Recent Labs   Lab 12/30/23  0502 12/31/23 0410 01/01/24  0311   WBC 5.01 6.19 6.58   HGB 7.8* 7.8* 7.6*   HCT 24.8* 24.7* 23.9*   * 149* 163       Recent Labs   Lab 12/29/23  0004 12/30/23  0705 12/30/23  1834 12/31/23 0410 01/01/24  0310   *  135*   < > 132* 137 136   K 4.1  4.1   < > 4.0 4.0 4.7   CL 97  97   < > 98 99 99   CO2 26  26   < > 22* 24 23   BUN 21*  21*   < > 15 18 28*   CREATININE 4.1*  4.1*   < > 3.8* 4.4* 6.3*   CALCIUM 7.6*  7.6*   < > 8.1* 8.5* 8.4*   PHOS 3.5  --   --   --   --     < > = values in this interval not displayed.       Recent Labs   Lab 12/30/23  1834 12/31/23  0410 01/01/24  0310   ALKPHOS 59 53* 59   BILITOT 0.3 0.3 0.3   PROT 6.2 5.9* 6.1   ALT 6* 6* 9*   AST 10 10 12       Significant Imaging:     JAVY 12/29/23    JAVY performed for infective endocarditis evaluation. Study aborted early due to clinical status.    Left Ventricle: The left ventricle is  normal in size. Normal wall thickness. Normal wall motion.    Right Ventricle: Normal right ventricular cavity size. Systolic function is normal. Pacemaker lead present in the ventricle.    Left Atrium: Left atrium is dilated. There is no thrombus in the left atrial cavity.    Right Atrium: Lead present in the right atrium. A large 0.7 x 2.6 cm mobile vegetation is attached to the ventricular lead within the right atrium. It does not appear to be attached to the tricuspid valve leaflets.     TTE 12/29/23    Left Ventricle: The left ventricle is normal in size. Increased ventricular mass. Increased wall thickness. There is concentric hypertrophy. Normal wall motion. There is normal systolic function with a visually estimated ejection fraction of 60 - 65%. Grade I diastolic dysfunction.    Right Ventricle: Normal right ventricular cavity size. Systolic function is normal. Pacemaker lead present in the ventricle with a 0.7 x 1.8 cm mobile, echogenic structure consistent with a vegetation.    Left Atrium: Left atrium is moderately dilated.    Right Atrium: Right atrium is mildly dilated.    Mitral Valve: The mitral valve is repaired by MitraClip. There is mild regurgitation.    Tricuspid Valve: The tricuspid valve is structurally normal. There is moderate regurgitation. The vegetation on the ventricular lead appears adjacent to but not adherent to the septal leaflet.    Pulmonary Artery: There is pulmonary hypertension. The estimated pulmonary artery systolic pressure is 76 mmHg.    IVC/SVC: Elevated venous pressure at 15 mmHg.    Pericardium: There is a trivial posterior effusion. No indication of cardiac tamponade.       Assessment and Plan:     * MRSA bacteremia  #CRT-D in place  #Infection associated with cardiac device  58yoF with T1DM, ESRD on HD, Cad s/p PCI, iCM with prior EF 25-30% recovered to 55-60% with CRT-D in place (dual-coiled 2018 [employer] CRT-D), severe MR s/p MitraClip, and chronic  thrombocytopenia is here as a transfer with bacteremia, vegetation involving her RV lead per outside JAVY. Unable to get good visualization with current imaging. Discussed with echo staff regarding need for further imaging lead extraction.     TTE with 0.7cmx1.8cm vegetation without evidence of TV involvement, similar on JAVY  Bcx MRSA +  HD per groin trialysis  Formal interrogation showed she is not device-dependent    Recommendations:  - Plan for lead extraction early this week  - CTS consulted for backup for procedure        Dejan Anders MD  Cardiac Electrophysiology  [facility] - Cardiology Stepdown

## 2024-01-01 NOTE — ASSESSMENT & PLAN NOTE
Patient's FSGs are controlled on current medication regimen.  Last A1c reviewed-   Lab Results   Component Value Date    HGBA1C 8.0 (H) 12/19/2023     Most recent fingerstick glucose reviewed-   Recent Labs   Lab 12/31/23  2040 01/01/24  0758 01/01/24  1114 01/01/24  1502   POCTGLUCOSE 192* 223* 251* 206*       Current correctional scale  Medium  Maintain anti-hyperglycemic dose as follows-   Antihyperglycemics (From admission, onward)    Start     Stop Route Frequency Ordered    01/01/24 2100  insulin detemir U-100 (Levemir) pen 16 Units         -- SubQ 2 times daily 01/01/24 1223    12/31/23 1200  insulin aspart U-100 pen 0-5 Units         -- SubQ Before meals, nightly and at 0200 PRN 12/31/23 1151    12/31/23 0745  insulin aspart U-100 pen 6 Units         -- SubQ 3 times daily with meals 12/31/23 0744        Hold Oral hypoglycemics while patient is in the hospital.

## 2024-01-01 NOTE — SUBJECTIVE & OBJECTIVE
Interval History: No acute issues, JAVY showed a vegetation in the right atrium.       Current Facility-Administered Medications:     albuterol sulfate nebulizer solution 2.5 mg, 2.5 mg, Nebulization, Q4H, Kathryn More PA-C, 2.5 mg at 01/01/24 0436    albuterol-ipratropium 2.5 mg-0.5 mg/3 mL nebulizer solution 3 mL, 3 mL, Nebulization, Q4H PRN, Kathryn More PA-C    aspirin chewable tablet 81 mg, 81 mg, Oral, Daily, Bhupendra Kumar MD, 81 mg at 12/31/23 0843    atorvastatin tablet 40 mg, 40 mg, Oral, Daily, Bhupendra Kumar MD, 40 mg at 12/31/23 0843    benzonatate capsule 200 mg, 200 mg, Oral, Q8H PRN, Bhupendra Kumar MD    calcium acetate(phosphat bind) capsule 667 mg, 667 mg, Oral, TID WM, Bhupendra Kumar MD, 667 mg at 12/31/23 1724    ceftaroline fosamiL (TEFLARO) 200 mg in dextrose 5 % (D5W) 50 mL IVPB, 200 mg, Intravenous, Q12H, Leigh Ann Harris DO, Stopped at 01/01/24 0623    DAPTOmycin (CUBICIN) 785 mg in sodium chloride 0.9% SolP 50 mL IVPB, 10 mg/kg, Intravenous, Q48H, Karen Ramirez MD, Stopped at 12/31/23 1030    dextrose 50 % in water (D50W) injection 50 mL, 50 mL, Intravenous, PRN, Bhupendra Kumar MD, 50 mL at 12/29/23 2021    docusate sodium capsule 50 mg, 50 mg, Oral, Daily, Yamilet Boyce MD, 50 mg at 12/31/23 0843    epoetin john injection 7,900 Units, 100 Units/kg, Intravenous, Every Tues, Thurs, Sat, Jono Lamas NP, 7,900 Units at 12/30/23 1102    furosemide tablet 80 mg, 80 mg, Oral, Daily, Yamilet Boyce MD, 80 mg at 12/31/23 0843    glucagon (human recombinant) injection 1 mg, 1 mg, Intramuscular, PRN, Bhupendra Kumar MD    glucose chewable tablet 16 g, 16 g, Oral, PRN, Bhupendra Kumar MD    glucose chewable tablet 24 g, 24 g, Oral, PRN, Bhupendra Kumar MD    heparin (porcine) injection 1,000 Units, 1,000 Units, Intravenous, PRN, Jono Lamas NP, 1,000 Units at 12/30/23 1136    heparin (porcine) injection 3,000 Units, 3,000 Units,  Intra-Catheter, Daily PRN, Bhupendra Kumar MD    heparin 25,000 units in dextrose 5% (100 units/ml) IV bolus from bag - ADDITIONAL PRN BOLUS - 30 units/kg, 30 Units/kg (Adjusted), Intravenous, PRN, Yamilet Boyce MD, 2,052 Units at 12/31/23 0602    heparin 25,000 units in dextrose 5% (100 units/ml) IV bolus from bag - ADDITIONAL PRN BOLUS - 60 units/kg, 60 Units/kg (Adjusted), Intravenous, PRN, Yamilet Boyce MD, 4,104 Units at 12/29/23 1706    heparin 25,000 units in dextrose 5% 250 mL (100 units/mL) infusion HIGH INTENSITY nomogram - [facility], 0-40 Units/kg/hr (Adjusted), Intravenous, Continuous, Yamilet Boyce MD, Last Rate: 15.7 mL/hr at 01/01/24 0604, 23 Units/kg/hr at 01/01/24 0604    hydrALAZINE injection 10 mg, 10 mg, Intravenous, Q8H PRN, Bhupendra Kumar MD    hydrALAZINE tablet 25 mg, 25 mg, Oral, Q8H, Bhupendra Kumar MD, 25 mg at 01/01/24 0156    insulin aspart U-100 pen 0-5 Units, 0-5 Units, Subcutaneous, AC + HS + 0200 PRN, Carissa Rosas PA-C    insulin aspart U-100 pen 6 Units, 6 Units, Subcutaneous, TIDWM, Carissa Rosas PA-C, 6 Units at 12/31/23 1736    insulin detemir U-100 (Levemir) pen 14 Units, 14 Units, Subcutaneous, BID, Carissa Rosas PA-C, 14 Units at 12/31/23 2044    melatonin tablet 3 mg, 3 mg, Oral, Nightly PRN, Bhupendra Kumar MD    metoprolol succinate (TOPROL-XL) 24 hr tablet 50 mg, 50 mg, Oral, Daily, Bhupendra Kumar MD, 50 mg at 12/31/23 0843    pantoprazole EC tablet 40 mg, 40 mg, Oral, Daily, Bhupendra Kumar MD, 40 mg at 12/31/23 0843    pregabalin capsule 50 mg, 50 mg, Oral, Daily, Bhupendra Kumar MD, 50 mg at 12/31/23 1746    promethazine tablet 25 mg, 25 mg, Oral, Q6H PRN, Bhupendra Kumar MD, 25 mg at 12/30/23 1739    sacubitriL-valsartan 24-26 mg per tablet 1 tablet, 1 tablet, Oral, BID, Bhupendra Kumar MD, 1 tablet at 12/31/23 2045    sodium chloride 0.9% flush 10 mL, 10 mL, Intravenous, PRN, Levar Olsen MD    vancomycin  (VANCOCIN) 4 mg, heparin (porcine) 5,000 Units IV catheter lock (total volume 2 mL), , Intra-Catheter, Once, Yamilet Boyce MD    Facility-Administered Medications Ordered in Other Encounters:     0.9%  NaCl infusion (for blood administration), , Intravenous, Q24H PRN, Manav Schneider MD    0.9%  NaCl infusion (for blood administration), , Intravenous, Q24H PRN, Manav Schneider MD    benzocaine 20 % oral spray, , Mouth/Throat, QID PRN, Leslie Fishman MD    sodium chloride 0.9% flush 10 mL, 10 mL, Intravenous, PRN, Manav Schneider MD    albuterol-ipratropium, benzonatate, dextrose 50 % in water (D50W), glucagon (human recombinant), glucose, glucose, heparin (porcine), heparin (porcine), heparin (PORCINE), heparin (PORCINE), hydrALAZINE, insulin aspart U-100, melatonin, promethazine, sodium chloride 0.9%      Review of Systems   Constitutional: Negative for diaphoresis and fever.   Cardiovascular:  Negative for chest pain, dyspnea on exertion, leg swelling, near-syncope, orthopnea, palpitations, paroxysmal nocturnal dyspnea and syncope.   Respiratory:  Negative for cough and shortness of breath.    Gastrointestinal:  Negative for abdominal pain, diarrhea, nausea and vomiting.   Neurological:  Negative for light-headedness.   Psychiatric/Behavioral:  Negative for altered mental status and substance abuse.      Objective:     Vital Signs (Most Recent):  Temp: 97.1 °F (36.2 °C) (01/01/24 0453)  Pulse: 91 (01/01/24 0453)  Resp: 19 (01/01/24 0453)  BP: 135/69 (01/01/24 0453)  SpO2: (!) 92 % (01/01/24 0453) Vital Signs (24h Range):  Temp:  [97.1 °F (36.2 °C)-99.2 °F (37.3 °C)] 97.1 °F (36.2 °C)  Pulse:  [73-98] 91  Resp:  [16-20] 19  SpO2:  [92 %-98 %] 92 %  BP: (130-149)/(60-71) 135/69     Weight: 78.5 kg (173 lb)  Body mass index is 27.1 kg/m².     SpO2: (!) 92 %     Telemetry: Normal sinus rhythm (90?), PVCs overnight.       Physical Exam  Constitutional:       Appearance: Normal appearance.    Cardiovascular:      Rate and Rhythm: Normal rate and regular rhythm.      Pulses: Normal pulses.      Heart sounds: Normal heart sounds.   Pulmonary:      Effort: Pulmonary effort is normal.      Breath sounds: Normal breath sounds. No wheezing or rales.   Abdominal:      General: Abdomen is flat. There is no distension.      Palpations: Abdomen is soft.      Tenderness: There is no abdominal tenderness.   Musculoskeletal:      Right lower leg: No edema.      Left lower leg: No edema.   Skin:     General: Skin is warm and dry.   Neurological:      Mental Status: She is alert and oriented to person, place, and time. Mental status is at baseline.   Psychiatric:         Mood and Affect: Mood normal.         Behavior: Behavior normal.            Significant Labs:     Recent Labs   Lab 12/30/23  0502 12/31/23  0410 01/01/24  0311   WBC 5.01 6.19 6.58   HGB 7.8* 7.8* 7.6*   HCT 24.8* 24.7* 23.9*   * 149* 163       Recent Labs   Lab 12/29/23  0004 12/30/23  0705 12/30/23  1834 12/31/23  0410 01/01/24  0310   *  135*   < > 132* 137 136   K 4.1  4.1   < > 4.0 4.0 4.7   CL 97  97   < > 98 99 99   CO2 26  26   < > 22* 24 23   BUN 21*  21*   < > 15 18 28*   CREATININE 4.1*  4.1*   < > 3.8* 4.4* 6.3*   CALCIUM 7.6*  7.6*   < > 8.1* 8.5* 8.4*   PHOS 3.5  --   --   --   --     < > = values in this interval not displayed.       Recent Labs   Lab 12/30/23  1834 12/31/23  0410 01/01/24  0310   ALKPHOS 59 53* 59   BILITOT 0.3 0.3 0.3   PROT 6.2 5.9* 6.1   ALT 6* 6* 9*   AST 10 10 12       Significant Imaging:     JAVY 12/29/23    JAVY performed for infective endocarditis evaluation. Study aborted early due to clinical status.    Left Ventricle: The left ventricle is normal in size. Normal wall thickness. Normal wall motion.    Right Ventricle: Normal right ventricular cavity size. Systolic function is normal. Pacemaker lead present in the ventricle.    Left Atrium: Left atrium is dilated. There is no thrombus in  the left atrial cavity.    Right Atrium: Lead present in the right atrium. A large 0.7 x 2.6 cm mobile vegetation is attached to the ventricular lead within the right atrium. It does not appear to be attached to the tricuspid valve leaflets.     TTE 12/29/23    Left Ventricle: The left ventricle is normal in size. Increased ventricular mass. Increased wall thickness. There is concentric hypertrophy. Normal wall motion. There is normal systolic function with a visually estimated ejection fraction of 60 - 65%. Grade I diastolic dysfunction.    Right Ventricle: Normal right ventricular cavity size. Systolic function is normal. Pacemaker lead present in the ventricle with a 0.7 x 1.8 cm mobile, echogenic structure consistent with a vegetation.    Left Atrium: Left atrium is moderately dilated.    Right Atrium: Right atrium is mildly dilated.    Mitral Valve: The mitral valve is repaired by MitraClip. There is mild regurgitation.    Tricuspid Valve: The tricuspid valve is structurally normal. There is moderate regurgitation. The vegetation on the ventricular lead appears adjacent to but not adherent to the septal leaflet.    Pulmonary Artery: There is pulmonary hypertension. The estimated pulmonary artery systolic pressure is 76 mmHg.    IVC/SVC: Elevated venous pressure at 15 mmHg.    Pericardium: There is a trivial posterior effusion. No indication of cardiac tamponade.

## 2024-01-01 NOTE — ASSESSMENT & PLAN NOTE
- S/P PCI D2 with SHU x 1 (07/13/2016)  - S/P PCI OM1 with SHU x 1 (01/04/2017)  - S/P PCI prox D2 with SHU x 1 (03/07/2017)  - Patent stents with NCCAD by [facility] 12/10/2019  - S/P [facility] with RCA 90 % stenosis, SHU x 3 (4/5/2022)    Most recent C during admission. Continue aspirin, heparin drip.

## 2024-01-01 NOTE — SUBJECTIVE & OBJECTIVE
"Interval HPI:   No acute events overnight. Patient in room 322/322 A. Blood glucose stable. BG at and above goal on current insulin regimen (SSI, prandial, and basal insulin ). Steroid use- None.   3 Days Post-Op  Renal function- Abnormal - 6.3 cr    Vasopressors-  None     Diet diabetic Cardiac (Low Na/Chol); 2000 Calorie; Fluid - 1500mL     Eatin%  Nausea: No  Hypoglycemia and intervention: No  Fever: No  TPN and/or TF: No    BP (!) 152/69 (BP Location: Right arm, Patient Position: Lying)   Pulse 94   Temp 100.2 °F (37.9 °C) (Oral)   Resp 18   Ht 5' 7" (1.702 m)   Wt 78.5 kg (173 lb)   LMP  (LMP Unknown)   SpO2 98%   BMI 27.10 kg/m²     Labs Reviewed and Include    Recent Labs   Lab 24  0310   *   CALCIUM 8.4*   ALBUMIN 2.2*   PROT 6.1      K 4.7   CO2 23   CL 99   BUN 28*   CREATININE 6.3*   ALKPHOS 59   ALT 9*   AST 12   BILITOT 0.3     Lab Results   Component Value Date    WBC 6.58 2024    HGB 7.6 (L) 2024    HCT 23.9 (L) 2024    MCV 92 2024     2024     No results for input(s): "TSH", "FREET4" in the last 168 hours.  Lab Results   Component Value Date    HGBA1C 8.0 (H) 2023       Nutritional status:   Body mass index is 27.1 kg/m².  Lab Results   Component Value Date    ALBUMIN 2.2 (L) 2024    ALBUMIN 2.1 (L) 2023    ALBUMIN 2.2 (L) 2023     No results found for: "PREALBUMIN"    Estimated Creatinine Clearance: 10.6 mL/min (A) (based on SCr of 6.3 mg/dL (H)).    Accu-Checks  Recent Labs     23  1222 23  1708 23  1848 23  2013 23  2105 23  2325 23  1319 23  1657 23  0758   POCTGLUCOSE 205* 404* 436* 476* 358* 234* 163* 161* 192* 223*       Current Medications and/or Treatments Impacting Glycemic Control  Immunotherapy:    Immunosuppressants       None          Steroids:   Hormones (From admission, onward)      Start     Stop Route Frequency Ordered "    12/29/23 0020  melatonin tablet 3 mg         -- Oral Nightly PRN 12/28/23 2331          Pressors:    Autonomic Drugs (From admission, onward)      None          Hyperglycemia/Diabetes Medications:   Antihyperglycemics (From admission, onward)      Start     Stop Route Frequency Ordered    12/31/23 1200  insulin aspart U-100 pen 0-5 Units         -- SubQ Before meals, nightly and at 0200 PRN 12/31/23 1151    12/31/23 0900  insulin detemir U-100 (Levemir) pen 14 Units         -- SubQ 2 times daily 12/31/23 0742    12/31/23 0745  insulin aspart U-100 pen 6 Units         -- SubQ 3 times daily with meals 12/31/23 0705

## 2024-01-01 NOTE — SUBJECTIVE & OBJECTIVE
Interval History:     States she is doing ok. Denies any new back or joint pains.     Review of Systems   Constitutional:  Negative for chills and fever.   Respiratory:  Negative for cough and shortness of breath.    Cardiovascular:  Negative for chest pain.   Gastrointestinal:  Negative for abdominal pain, constipation, diarrhea, nausea and vomiting.   Musculoskeletal:  Negative for arthralgias and myalgias.   Skin:  Negative for rash.   Neurological:  Negative for headaches.     Objective:     Vital Signs (Most Recent):  Temp: 100 °F (37.8 °C) (01/01/24 1116)  Pulse: 93 (01/01/24 1127)  Resp: 20 (01/01/24 1116)  BP: (!) 157/75 (01/01/24 1116)  SpO2: 98 % (01/01/24 1116) Vital Signs (24h Range):  Temp:  [97.1 °F (36.2 °C)-100.2 °F (37.9 °C)] 100 °F (37.8 °C)  Pulse:  [73-98] 93  Resp:  [16-20] 20  SpO2:  [92 %-98 %] 98 %  BP: (130-157)/(60-75) 157/75     Weight: 78.5 kg (173 lb)  Body mass index is 27.1 kg/m².    Estimated Creatinine Clearance: 10.6 mL/min (A) (based on SCr of 6.3 mg/dL (H)).     Physical Exam  Vitals reviewed.   Constitutional:       General: She is not in acute distress.     Appearance: Normal appearance. She is not ill-appearing.   HENT:      Head: Normocephalic and atraumatic.   Eyes:      Extraocular Movements: Extraocular movements intact.      Conjunctiva/sclera: Conjunctivae normal.   Cardiovascular:      Rate and Rhythm: Normal rate and regular rhythm.   Pulmonary:      Effort: Pulmonary effort is normal. No respiratory distress.      Breath sounds: Normal breath sounds. No wheezing.   Abdominal:      General: Abdomen is flat. Bowel sounds are normal.      Palpations: Abdomen is soft.      Tenderness: There is no abdominal tenderness.   Musculoskeletal:      Cervical back: Normal range of motion.   Skin:     General: Skin is warm and dry.   Neurological:      Mental Status: She is alert and oriented to person, place, and time.   Psychiatric:         Mood and Affect: Mood normal.          Behavior: Behavior normal.         Thought Content: Thought content normal.          Significant Labs: All pertinent labs within the past 24 hours have been reviewed.  Recent Lab Results  (Last 5 results in the past 24 hours)        01/01/24  1114   01/01/24  0758   01/01/24  0311   01/01/24  0310   12/31/23  2040        Albumin       2.2         ALP       59         ALT       9         Anion Gap       14         aPTT       54.4  Comment: Refer to local heparin nomogram for intensity/dose specific   therapeutic   range.           AST       12         Baso #     0.04           Basophil %     0.6           BILIRUBIN TOTAL       0.3  Comment: For infants and newborns, interpretation of results should be based  on gestational age, weight and in agreement with clinical  observations.    Premature Infant recommended reference ranges:  Up to 24 hours.............<8.0 mg/dL  Up to 48 hours............<12.0 mg/dL  3-5 days..................<15.0 mg/dL  6-29 days.................<15.0 mg/dL           BUN       28         Calcium       8.4         Chloride       99         CO2       23         Creatinine       6.3         Differential Method     Automated           eGFR       7.2         Eos #     0.2           Eosinophil %     3.5           Glucose       241         Gran # (ANC)     4.6           Gran %     70.3           Hematocrit     23.9           Hemoglobin     7.6           Immature Grans (Abs)     0.07  Comment: Mild elevation in immature granulocytes is non specific and   can be seen in a variety of conditions including stress response,   acute inflammation, trauma and pregnancy. Correlation with other   laboratory and clinical findings is essential.             Immature Granulocytes     1.1           Lymph #     1.1           Lymph %     16.1           Magnesium        1.8         MCH     29.2           MCHC     31.8           MCV     92           Mono #     0.6           Mono %     8.4           MPV     11.0            nRBC     0           Platelet Count     163           POCT Glucose 251   223       192       Potassium       4.7         PROTEIN TOTAL       6.1         RBC     2.60           RDW     16.4           Sodium       136         WBC     6.58                                  Significant Imaging: I have reviewed all pertinent imaging results/findings within the past 24 hours.

## 2024-01-01 NOTE — ASSESSMENT & PLAN NOTE
- ESRD with trialysis line removed.   - Nephrology following- Rt fem CVL- will discuss with nephro and ID about more permanent HD needs- will need clearance of blood cultures and ICD removal first

## 2024-01-01 NOTE — ASSESSMENT & PLAN NOTE
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 Blood cultures on 12/8 with C perfringes and MRSA. Repeat blood cultures 12/18-27 with MRSA. Patient is afebrile and without leukocytosis. Repeat blood cultures collected in [facility] on 12/30 still positive. JAVY with vegetation per discussion with teams.     Recommendations  Continue daptomycin and ceftaroline  If unable to clear bacteremia post lead extraction then may need to consider femoral catheter removal or exchange.  Follow up repeat blood cultures

## 2024-01-02 NOTE — PROGRESS NOTES
[facility] - Cardiology Stepdown  Endocrinology  Progress Note    Admit Date: 12/28/2023     Reason for Consult: Management of VANESSA (managed as T1DM), Hyperglycemia     Diabetes diagnosis year: 2008 (likely had issues with decreasing pancreatic function prior)    Home Diabetes Medications:  Lantus 30 units nightly; Humalog 5 units with meals + SSI (ISF 1:10)    How often checking glucose at home? >4 x day (Dexcom G6)  BG readings on regimen: 100-150's  Hypoglycemia on the regimen?  No  Missed doses on regimen?  No    Diabetes Complications include:     Hyperglycemia, Hypoglycemia , Hypoglycemia unawareness, Diabetic chronic kidney disease     , Diabetic retinopathy , Diabetic cataract , and Diabetic peripheral neuropathy     Complicating diabetes co morbidities:   HTN; HLD; CAD; ESRD on HD      HPI: Ellen Roman is a 59 yo female with hx of DM1, HTN, HLD, CAD s/p PCI with AICD placement, Mitral Regurgitation s/p clip, and ESRD on iHD MWF who presents as a transfer from OSH for higher level of care.  According to records, patient initially presented to OSH on 12/18 with chief complaint of R sided chest pain associated with N/V x 2 days with low grade temp.  Cardiology evaluated there and performed a LCH on 12/20 but no PCI was performed and they recommended medical management for her CAD.  Her BC grew MRSA bacteremia that remained positive during the admission with source suspected to be from her RIJ Tunneled dialysis catheter. The TDC was removed and IR was consulted for tempoary line placement and she was found to have a thrombosed LIJ from her previous catheter and a temporary catheter had to be placed to the R femoral vein.  Her BC continued to remain positive despite treatment with Vancomycin and a JAVY was ordered with findings of vegetations to the AICD lead wire and she was then transferred to [facility] [place] higher level of care. Endocrine consulted to manage type 1 diabetes. Of note, chart has mixed information  "regarding T1DM vs. T2DM. Suppressed C-peptide noted at [facility], but cannot find antibody labs to confirm VANESSA vs. Pancreatic DM.      Lab Results   Component Value Date    HGBA1C 8.0 (H) 2023             Interval HPI:   No acute events overnight. Patient in room 322/322 A. Blood glucose stable. BG at and above goal on current insulin regimen (SSI, prandial, and basal insulin ). Steroid use- None.   4 Days Post-Op  Renal function- Abnormal - 7.2 cr    Vasopressors-  None     Diet diabetic Cardiac (Low Na/Chol); 2000 Calorie; Fluid - 1500mL     Eatin%   Nausea: No  Hypoglycemia and intervention: No  Fever: No  TPN and/or TF: No    BP (!) 150/71 (BP Location: Right arm, Patient Position: Lying)   Pulse 96   Temp 98.6 °F (37 °C) (Oral)   Resp 18   Ht 5' 7" (1.702 m)   Wt 78.5 kg (173 lb)   LMP  (LMP Unknown)   SpO2 (!) 94%   BMI 27.10 kg/m²     Labs Reviewed and Include    Recent Labs   Lab 24  0508   *   CALCIUM 8.4*   ALBUMIN 2.2*   PROT 6.2   *   K 5.0   CO2 19*      BUN 34*   CREATININE 7.2*   ALKPHOS 57   ALT 7*   AST 10   BILITOT 0.3     Lab Results   Component Value Date    WBC 5.91 2024    HGB 7.6 (L) 2024    HCT 22.6 (L) 2024    MCV 90 2024     (L) 2024     No results for input(s): "TSH", "FREET4" in the last 168 hours.  Lab Results   Component Value Date    HGBA1C 8.0 (H) 2023       Nutritional status:   Body mass index is 27.1 kg/m².  Lab Results   Component Value Date    ALBUMIN 2.2 (L) 2024    ALBUMIN 2.2 (L) 2024    ALBUMIN 2.1 (L) 2023     No results found for: "PREALBUMIN"    Estimated Creatinine Clearance: 9.3 mL/min (A) (based on SCr of 7.2 mg/dL (H)).    Accu-Checks  Recent Labs     12/30/23  2325 23  1319 23  1657 23  2040 24  0758 24  1114 24  1502 24  2025 24  0806 24  1351   POCTGLUCOSE 234* 163* 161* 192* 223* " 251* 206* 177* 192* 246*       Current Medications and/or Treatments Impacting Glycemic Control  Immunotherapy:    Immunosuppressants       None          Steroids:   Hormones (From admission, onward)      Start     Stop Route Frequency Ordered    12/29/23 0020  melatonin tablet 3 mg         -- Oral Nightly PRN 12/28/23 2331          Pressors:    Autonomic Drugs (From admission, onward)      None          Hyperglycemia/Diabetes Medications:   Antihyperglycemics (From admission, onward)      Start     Stop Route Frequency Ordered    01/01/24 2100  insulin detemir U-100 (Levemir) pen 16 Units         -- SubQ 2 times daily 01/01/24 1223    12/31/23 1200  insulin aspart U-100 pen 0-5 Units         -- SubQ Before meals, nightly and at 0200 PRN 12/31/23 1151    12/31/23 0745  insulin aspart U-100 pen 6 Units         -- SubQ 3 times daily with meals 12/31/23 0744            ASSESSMENT and PLAN    Renal/  ESRD (end stage renal disease)  Titrate insulin slowly to avoid hypoglycemia as the risk of hypoglycemia increases with decreased creatinine clearance.      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 Please notify endocrinology prior to discharge.        Carissa Rosas PA-C  Endocrinology  [facility] -  Cardiology Stepdown

## 2024-01-02 NOTE — ASSESSMENT & PLAN NOTE
CIED (pacemaker lead vegetation on JAVY) from persistent MRSA bacteremia on salvage therapy, bacteremia present for > two weeks, cx persistently positive from 12/18 onwards. Transferred from OSH to [facility] [place] for evaluation for extraction.  Blood cultures on 12/8 with C perfringes and MRSA. Repeat blood cultures  from admission 12/18-30 with MRSA. Patient is afebrile, without leukocytosis and stable hemodynamics. Most recent Bcx 01/01/24 incubating.      Recommendations  Continue daptomycin and Ceftaroline (switch dosing to TID, discussed with ID pharmacy team)  Follow up repeat blood cultures, check CPK weekly  Follow up EP recommendations, anticipating extraction this week.   At least six weeks of therapy from source control and microbiological clearance. Removal of femoral line will help if she does not clear bacteremia post lead extraction

## 2024-01-02 NOTE — CONSULTS
[facility] - Cardiology Stepdown  Cardiothoracic Surgery  Consult Note    Patient Name: Ellen Roman  MRN: [id number]  Admission Date: 12/28/2023  Attending Physician: Yamilet Boyce MD  Referring Provider: Lucina Joya,*    Patient information was obtained from patient, past medical records, and ER records.     Inpatient consult to Cardiothoracic Surgery  Consult performed by: Jeanie Bhandari NP  Consult ordered by: Gillies, Connor M, MD  Reason for consult: lead extraction backup      Subjective:     Principal Problem: MRSA bacteremia    History of Present Illness: Ellen Roman is 58 year old female with a medical history significant for T1DM c/b gastroparesis, retinopathy and nephropathy - ESRD on HD, HTN, HLD, CAD s/p prior PCI with ischemic cardiomyopathy s/p AICD placement, chronic thrombocytopenia, severe mitral regurgitation status post MitraClip, history of fungemia/bacteremia initially presenting to [facility] ER on 12/18 with a complaints of right-sided chest pain associated with nausea and vomiting multiple episodes for the prior 2 days.      She also reported low-grade temperature. Patient reports generalized weakness. Cardiology performed LHC on 12/20/2023 for evaluation of this chest pain with recommendation for medical management for coronary artery disease. Course is then complicated by refractory MRSA bacteremia.  team initially suspected source of bacteremia was a tunneled dialysis catheter. This was removed upon admission and patient had placement of left IJ.  Due to persistent bacteremia despite vancomycin, patient subsequently had JAVY 12/27/23 with findings reported as positive for vegetations present on the ICD lead wire. As per OS cardiologist, requested transfer from [facility] to [facility]. 2018 CRT-D [employer].     CTS is being consulted for back up for ICD extraction secondary to vegetations present on ICD lead.     Current Facility-Administered Medications on File Prior to  Encounter   Medication    0.9%  NaCl infusion (for blood administration)    0.9%  NaCl infusion (for blood administration)    benzocaine 20 % oral spray    sodium chloride 0.9% flush 10 mL     Current Outpatient Medications on File Prior to Encounter   Medication Sig    alirocumab (PRALUENT PEN) 150 mg/mL PnIj Inject 1 mL (150 mg total) into the skin every 14 (fourteen) days.    atorvastatin (LIPITOR) 40 MG tablet Take 1 tablet by mouth nightly.    calcitRIOL (ROCALTROL) 0.25 MCG Cap Take 1 capsule by mouth once daily.    cloNIDine (CATAPRES) 0.3 MG tablet Take 1 tablet by mouth 2 (two) times daily.    clopidogreL (PLAVIX) 75 mg tablet Take 1 tablet by mouth once daily.    diphenhydrAMINE (BENADRYL) 25 mg capsule Take 1 capsule (25 mg total) by mouth nightly as needed for Itching.    doxazosin (CARDURA) 8 MG Tab Take 1 tablet by mouth nightly.    ENTRESTO  mg per tablet TAKE 1 TABLET(S) BY MOUTH TWO TIMES A DAY    famotidine (PEPCID) 20 MG tablet Take 1 tablet by mouth once daily.    furosemide (LASIX) 20 MG tablet Take 1 tablet by mouth once daily.    hydrALAZINE (APRESOLINE) 50 MG tablet Take 1 tablet by mouth 2 (two) times a day (morning and before bed).    metoprolol succinate (TOPROL-XL) 100 MG 24 hr tablet Take 1 tablet by mouth once daily.    metroNIDAZOLE (FLAGYL) 500 MG tablet Take 1 tablet in the morning and 1 tablet before bedtime for 7 days.    NIFEdipine (PROCARDIA-XL) 60 MG (OSM) 24 hr tablet Take 1 tablet by mouth once daily.    ondansetron (ZOFRAN-ODT) 4 MG TbDL Take 1 tablet by mouth.    ondansetron (ZOFRAN-ODT) 4 MG TbDL Take 1 tablet by mouth every 8 (eight) hours as needed for up to 7 days.    pantoprazole (PROTONIX) 40 MG tablet Take 1 tablet by mouth once daily.    prochlorperazine (COMPAZINE) 10 MG tablet Take 1 tablet by mouth every 6 (six) hours as needed for up to 7 days.    promethazine (PHENERGAN) 25 MG tablet Take 1 tablet by mouth 4 (four) times daily as needed for nausea for up  to 7 days    thiamine (VITAMIN B-1) 100 MG tablet Take 1 tablet (100 mg total) by mouth once daily.    vancomycin (VANCOCIN) 125 MG capsule Take 1 capsule by mouth in the morning and 1capsule by mouth at noon and 1 capsule by mouth in the evening and 1 capsule by mouth before bedtime for 10 days.    amoxicillin-clavulanate 500-125mg (AUGMENTIN) 500-125 mg Tab Take 1 tablet by mouth every morning for 2 days    blood sugar diagnostic (TRUE METRIX GLUCOSE TEST STRIP MISC) True Metrix Glucose Test Strip   USE TO TEST BLOOD SUGAR 3 TIMES DAILY    blood sugar diagnostic Strp Use to test blood sugar 3 (three) times daily before meals.    blood-glucose meter Misc Use as directed    blood-glucose meter Misc Use to test blood sugar as directed    blood-glucose sensor (DEXCOM G7 SENSOR) Gini Apply one to skin as directed and change q 10 days.    bumetanide (BUMEX) 2 MG tablet Take 1 tablet by mouth once daily.    calcitRIOL (ROCALTROL) 0.25 MCG Cap Take 1 capsule by mouth once daily.    calcium acetate,phosphat bind, (PHOSLO) 667 mg tablet Take 1 tablet by mouth in the morning and 1 tablet at noon and 1 tablet in the evening with meals.    epoetin john (PROCRIT) 20,000 unit/mL injection Inject 1 ml under the skin every two weeks. Administer only if hemoglobin is <10.5.    insulin glargine 100 units/mL SubQ pen Inject 30 Units into the skin nightly.    insulin lispro 100 unit/mL injection Inject 5 Units into the skin 3 (three) times daily with meals. Discard vial 28 days after opening.    insulin lispro 100 unit/mL injection Inject 0-20 Units into the skin 4 (four) times daily before meals and nightly. If BS : 0 units, -200: 4 units, -250: 8 units; -300: 12 units, -350: 16 units, -400: 20 units, BS > 400: 20 units and call your doctor    insulin lispro protamin-lispro (HUMALOG MIX 50-50 KWIKPEN) 100 unit/mL (50-50) InPn Inject 10 Units into the skin 3 (three) times daily before meals.     "metoclopramide HCl (REGLAN) 10 MG tablet Take 1 tablet by mouth 4 (four) times daily before meals and nightly.    nitroGLYCERIN (NITROSTAT) 0.4 MG SL tablet Place 1 tablet under the tongue every 5 (five) minutes as needed for chest pain.  Max of 3 tablets in a 15 minute period.    venlafaxine (EFFEXOR-XR) 37.5 MG 24 hr capsule Take 1 capsule by mouth daily    [DISCONTINUED] aspirin 81 MG Chew Take 81 mg by mouth.    [DISCONTINUED] folic acid (FOLVITE) 1 MG tablet Take 5 tablets by mouth daily for 10 days, THEN take 1 tablet daily for 80 days    [DISCONTINUED] insulin syringe-needle U-100 1 mL 31 gauge x 15/64" Syrg Inject 1 Syringe into the skin once daily.    [DISCONTINUED] lancets (ONETOUCH ULTRASOFT LANCETS) Misc Use 1 lancet to check blood sugar 4 (four) times daily.    [DISCONTINUED] losartan (COZAAR) 100 MG tablet Take 1 tablet  by mouth nightly for blood pressure.       Review of patient's allergies indicates:  No Known Allergies    Past Medical History:   Diagnosis Date    Acute hepatitis A     Acute kidney injury 03/07/2018    Acute respiratory failure with hypoxemia 03/13/2020    Arthritis     Cataract     No date or laterality given    Chronic systolic CHF (congestive heart failure), NYHA class 3     Coronary artery disease     Diabetes mellitus     Dialysis patient     Dilated cardiomyopathy     Diverticulosis of intestine 10/02/2013    Gastroparesis 02/24/2014    Hypertension     ICD (implantable cardioverter-defibrillator), biventricular, in situ     Ischemic cardiomyopathy 04/29/2018    Left bundle branch block 04/29/2018    Microcytic hypochromic anemia 03/07/2018    Migraine 01/24/2016    Presence of drug coated stent in LAD coronary artery     A SHU stent was placed to the mid D#2 in 7-2016, and then into the ostium of the D#2 in 1-2018, and then to the prox-mid D#2 in 3-2017    Presence of drug coated stent in left circumflex coronary artery 01/20/2017    A SHU stent was placed to the proximal OM " 1 branch in January of 2017    Severe mitral regurgitation 04/12/2018     Past Surgical History:   Procedure Laterality Date    ABLATION OF ARRHYTHMOGENIC FOCUS FOR SUPRAVENTRICULAR TACHYCARDIA WITH ELECTROPHYSIOLOGY STUDY N/A 10/06/2021    Procedure: Ablation SVT;  Surgeon: Joby Gutiérrez MD;  Location: [facility] EP LAB;  Service: Cardiology;  Laterality: N/A;    ANGIOGRAM, CORONARY, WITH LEFT HEART CATHETERIZATION  04/05/2022    Procedure: Angiogram, Coronary, with Left Heart Cath;  Surgeon: Manav Schneider MD;  Location: [facility] CATH LAB;  Service: Cardiology;;    BREAST BIOPSY Left     BREAST SURGERY Left     left biopsy    CARDIAC CATHETERIZATION      CARDIAC DEFIBRILLATOR PLACEMENT      CARDIAC ELECTROPHYSIOLOGY STUDY WITH DRUG CHALLENGE  10/06/2021    Procedure: EP Drug challenge;  Surgeon: Joby Gutiérrez MD;  Location: [facility] EP LAB;  Service: Cardiology;;    CATHETERIZATION OF BOTH LEFT AND RIGHT HEART N/A 12/10/2019    Procedure: CATHETERIZATION, HEART, BOTH LEFT AND RIGHT;  Surgeon: LUIS ALFREDO Ernandez MD;  Location: [facility] CATH LAB;  Service: Cardiology;  Laterality: N/A;    ECHOCARDIOGRAM,TRANSESOPHAGEAL N/A 12/29/2023    Procedure: Transesophageal echo (JAVY) intra-procedure log documentation;  Surgeon: Vielka Anaya Diagnostic;  Location: [facility] EP LAB;  Service: Cardiology;  Laterality: N/A;    TONSILLECTOMY      TRANSESOPHAGEAL ECHOCARDIOGRAPHY  07/11/2016    TRANSESOPHAGEAL ECHOCARDIOGRAPHY N/A 01/10/2022    Procedure: ECHOCARDIOGRAM, TRANSESOPHAGEAL;  Surgeon: Leslie Fishman MD;  Location: [facility] CATH LAB;  Service: Cardiology;  Laterality: N/A;    TRANSESOPHAGEAL ECHOCARDIOGRAPHY N/A 8/17/2022    Procedure: ECHOCARDIOGRAM, TRANSESOPHAGEAL;  Surgeon: Manav Schneider MD;  Location: [facility] CATH LAB;  Service: Cardiology;  Laterality: N/A;    TUBAL LIGATION      VASCULAR SURGERY       Family History       Problem Relation (Age of Onset)    Colon cancer Father    Heart disease Mother    Hypertension Mother, Brother,  Brother    Kidney cancer Sister    Leukemia Sister          Tobacco Use    Smoking status: Never    Smokeless tobacco: Never   Substance and Sexual Activity    Alcohol use: Not Currently    Drug use: Never    Sexual activity: Yes     Partners: Male     Review of Systems   Constitutional:  Negative for activity change, appetite change, fatigue and fever.   HENT:  Negative for nosebleeds.    Respiratory:  Negative for cough and shortness of breath.    Cardiovascular:  Negative for chest pain, palpitations and leg swelling.   Gastrointestinal:  Negative for abdominal distention, abdominal pain and nausea.   Genitourinary:  Negative for frequency.   Musculoskeletal:  Negative for arthralgias and myalgias.   Skin:  Negative for rash.   Neurological:  Negative for dizziness and numbness.   Hematological:  Does not bruise/bleed easily.     Objective:     Vital Signs (Most Recent):  Temp: 98.5 °F (36.9 °C) (01/02/24 0730)  Pulse: 90 (01/02/24 0738)  Resp: 17 (01/02/24 0730)  BP: (!) 160/83 (01/02/24 0738)  SpO2: 97 % (01/02/24 0340) Vital Signs (24h Range):  Temp:  [97.8 °F (36.6 °C)-100.2 °F (37.9 °C)] 98.5 °F (36.9 °C)  Pulse:  [] 90  Resp:  [16-20] 17  SpO2:  [95 %-98 %] 97 %  BP: (121-160)/(59-83) 160/83     Weight: 78.5 kg (173 lb)  Body mass index is 27.1 kg/m².    SpO2: 97 %        Intake/Output - Last 3 Shifts         12/31 0700 01/01 0659 01/01 0700 01/02 0659 01/02 0700 01/03 0659    P.O. 458 342     Other       Total Intake(mL/kg) 458 (5.8) 342 (4.4)     Other       Total Output       Net +458 +342            Urine Occurrence 5 x 2 x              Lines/Drains/Airways       Peripherally Inserted Central Catheter Line  Duration             (RETIRED) PICC Line - Triple Lumen 12/21/23 2200 11 days              Central Venous Catheter Line  Duration             Trialysis (Dialysis) Catheter 12/27/23 0913 right femoral 5 days              Peripheral Intravenous Line  Duration                  Peripheral IV -  Single Lumen 01/01/24 1018 20 G Anterior;Left Forearm <1 day                          Physical Exam  HENT:      Head: Normocephalic and atraumatic.   Eyes:      Extraocular Movements: Extraocular movements intact.   Cardiovascular:      Rate and Rhythm: Normal rate and regular rhythm.   Pulmonary:      Effort: Pulmonary effort is normal.   Abdominal:      General: Abdomen is flat.      Palpations: Abdomen is soft.   Musculoskeletal:         General: Normal range of motion.      Cervical back: Normal range of motion.      Comments: R FEM trialysis   Skin:     General: Skin is warm and dry.      Capillary Refill: Capillary refill takes less than 2 seconds.   Neurological:      General: No focal deficit present.            Significant Labs:  BMP:   Recent Labs   Lab 01/02/24  0508   *   *   K 5.0      CO2 19*   BUN 34*   CREATININE 7.2*   CALCIUM 8.4*   MG 1.9     CBC:   Recent Labs   Lab 01/02/24  0508   WBC 5.91   RBC 2.52*   HGB 7.6*   HCT 22.6*   *   MCV 90   MCH 30.2   MCHC 33.6     CMP:   Recent Labs   Lab 01/02/24  0508   *   CALCIUM 8.4*   ALBUMIN 2.2*   PROT 6.2   *   K 5.0   CO2 19*      BUN 34*   CREATININE 7.2*   ALKPHOS 57   ALT 7*   AST 10   BILITOT 0.3     Coagulation:   Recent Labs   Lab 01/02/24  0508   APTT 39.3*       Significant Diagnostics:   JAVY: 12/29/2023    JAVY performed for infective endocarditis evaluation. Study aborted early due to clinical status.    Left Ventricle: The left ventricle is normal in size. Normal wall thickness. Normal wall motion.    Right Ventricle: Normal right ventricular cavity size. Systolic function is normal. Pacemaker lead present in the ventricle.    Left Atrium: Left atrium is dilated. There is no thrombus in the left atrial cavity.    Right Atrium: Lead present in the right atrium. A large 0.7 x 2.6 cm mobile vegetation is attached to the ventricular lead within the right atrium. It does not appear to be attached to the  tricuspid valve leaflets.  I have reviewed and interpreted all pertinent imaging results/findings within the past 24 hours.    Assessment/Plan:       * MRSA bacteremia  Ellen oRman is 58 year old female with a medical history significant for T1DM c/b gastroparesis, retinopathy and nephropathy - ESRD on HD, HTN, HLD, CAD s/p prior PCI with ischemic cardiomyopathy s/p AICD placement, chronic thrombocytopenia, severe mitral regurgitation status post MitraClip, history of fungemia/bacteremia initially presenting to [facility] ER on 12/18 with a complaints of right-sided chest pain associated with nausea and vomiting multiple episodes for the prior 2 days. Course was then complicated by refractory MRSA bacteremia.  Due to persistent bacteremia despite vancomycin, patient subsequently had JAVY 12/27/23 with findings reported as positive for vegetations present on the ICD lead wire. As per OS cardiologist, requested transfer from [facility] [place] [facility] CRT[employer]D [employer]. CTS is being consulted for back up for ICD extraction secondary to vegetations present on ICD lead.     CTS will offer back up        Thank you for your consult. I will sign off. Please contact us if you have any additional questions.    Jeanie Bhandari NP  Cardiothoracic Surgery  [facility] - Cardiology Stepdown    CTS Attending Note:    I agree with the MIKE's note as stated above. Will be glad to provide backup. Surgeon availability will depend on scheduling.

## 2024-01-02 NOTE — PROGRESS NOTES
[facility] Step[facility]  Cardiac Electrophysiology  Progress Note    Admission Date: 12/28/2023  Code Status: Full Code   Attending Physician: Yamilet Boyce MD   Expected Discharge Date: 1/2/2024  Principal Problem:MRSA bacteremia    Subjective:     Interval History: No acute issues, most recent blood cultures still positive. JAVY with large thrombus in RA on RV wire (not involving TV).      Current Facility-Administered Medications:     0.9%  NaCl infusion, , Intravenous, Once, Verónica Abdi DNP, FNP-C, Held at 01/02/24 0000    albuterol sulfate nebulizer solution 2.5 mg, 2.5 mg, Nebulization, Q4H, Kathryn More PA-C, 2.5 mg at 01/02/24 0324    albuterol-ipratropium 2.5 mg-0.5 mg/3 mL nebulizer solution 3 mL, 3 mL, Nebulization, Q4H PRN, Kathryn More PA-C    aspirin chewable tablet 81 mg, 81 mg, Oral, Daily, Bhupendra Kumar MD, 81 mg at 01/01/24 1212    atorvastatin tablet 40 mg, 40 mg, Oral, Daily, Bhupendra Kumar MD, 40 mg at 01/01/24 1212    benzonatate capsule 200 mg, 200 mg, Oral, Q8H PRN, Bhupendra Kumar MD    calcium acetate(phosphat bind) capsule 667 mg, 667 mg, Oral, TID WM, Bhupendra Kumar MD, 667 mg at 01/01/24 1212    ceftaroline fosamiL (TEFLARO) 200 mg in dextrose 5 % (D5W) 50 mL IVPB, 200 mg, Intravenous, Q12H, Leigh Ann Harris DO, Stopped at 01/02/24 0623    DAPTOmycin (CUBICIN) 785 mg in sodium chloride 0.9% SolP 50 mL IVPB, 10 mg/kg, Intravenous, Q48H, Karen Ramirez MD, Stopped at 12/31/23 1030    dextrose 50 % in water (D50W) injection 50 mL, 50 mL, Intravenous, PRN, Bhupendra Kumar MD, 50 mL at 12/29/23 2021    docusate sodium capsule 50 mg, 50 mg, Oral, Daily, Yamilet Boyce MD, 50 mg at 01/01/24 1212    epoetin john injection 7,900 Units, 100 Units/kg, Intravenous, Every Tues, Thurs, Sat, Jono Lamas NP, 7,900 Units at 12/30/23 1102    furosemide tablet 80 mg, 80 mg, Oral, Daily, Yamilet Boyce MD, 80 mg at 01/01/24 1212     glucagon (human recombinant) injection 1 mg, 1 mg, Intramuscular, PRN, Bhupendra Kumar MD    glucose chewable tablet 16 g, 16 g, Oral, PRN, Bhupendra Kumar MD    glucose chewable tablet 24 g, 24 g, Oral, PRN, Bhupendra Kumar MD    heparin (porcine) injection 1,000 Units, 1,000 Units, Intravenous, PRN, Jono Lamas NP, 1,000 Units at 12/30/23 1136    heparin (porcine) injection 3,000 Units, 3,000 Units, Intra-Catheter, Daily PRN, Bhupendra Kumar MD    heparin 25,000 units in dextrose 5% (100 units/ml) IV bolus from bag - ADDITIONAL PRN BOLUS - 30 units/kg, 30 Units/kg (Adjusted), Intravenous, PRN, Yamilet Boyce MD, 2,052 Units at 12/31/23 0602    heparin 25,000 units in dextrose 5% (100 units/ml) IV bolus from bag - ADDITIONAL PRN BOLUS - 60 units/kg, 60 Units/kg (Adjusted), Intravenous, PRN, Yamilet Boyce MD, 4,104 Units at 12/29/23 1706    heparin 25,000 units in dextrose 5% 250 mL (100 units/mL) infusion HIGH INTENSITY nomogram - [facility], 0-40 Units/kg/hr (Adjusted), Intravenous, Continuous, Yamilet Boyce MD, Last Rate: 15.7 mL/hr at 01/01/24 2121, 23 Units/kg/hr at 01/01/24 2121    hydrALAZINE injection 10 mg, 10 mg, Intravenous, Q8H PRN, Bhupendra Kumar MD    hydrALAZINE tablet 25 mg, 25 mg, Oral, Q8H, Bhupendra Kumar MD, 25 mg at 01/02/24 0130    insulin aspart U-100 pen 0-5 Units, 0-5 Units, Subcutaneous, AC + HS + 0200 PRN, Carissa Rosas PA-C, 2 Units at 01/01/24 1716    insulin aspart U-100 pen 6 Units, 6 Units, Subcutaneous, TIDWM, Carissa Rosas PA-C, 6 Units at 01/01/24 1715    insulin detemir U-100 (Levemir) pen 16 Units, 16 Units, Subcutaneous, BID, Carissa Rosas PA-C, 16 Units at 01/01/24 2027    melatonin tablet 3 mg, 3 mg, Oral, Nightly PRN, Bhupendra Kumar MD    metoprolol succinate (TOPROL-XL) 24 hr tablet 50 mg, 50 mg, Oral, Daily, Bhupendra Kumar MD, 50 mg at 01/01/24 1212    pantoprazole EC tablet 40 mg, 40 mg, Oral, Daily, Bhupendra Kumar,  MD, 40 mg at 01/01/24 1213    pregabalin capsule 50 mg, 50 mg, Oral, Daily, Bhupendra Kumar MD, 50 mg at 01/01/24 1715    promethazine tablet 25 mg, 25 mg, Oral, Q6H PRN, Bhupendra Kumar MD, 25 mg at 12/30/23 1739    sacubitriL-valsartan 24-26 mg per tablet 1 tablet, 1 tablet, Oral, BID, Bhupendra Kumar MD, 1 tablet at 01/01/24 2027    sodium chloride 0.9% flush 10 mL, 10 mL, Intravenous, PRN, Levar Olsen MD    vancomycin (VANCOCIN) 4 mg, heparin (porcine) 5,000 Units IV catheter lock (total volume 2 mL), , Intra-Catheter, Daily, Yamilet Boyce MD    Facility-Administered Medications Ordered in Other Encounters:     0.9%  NaCl infusion (for blood administration), , Intravenous, Q24H PRN, Manav Schneider MD    0.9%  NaCl infusion (for blood administration), , Intravenous, Q24H PRN, Manav Schneider MD    benzocaine 20 % oral spray, , Mouth/Throat, QID PRN, Leslie Fishman MD    sodium chloride 0.9% flush 10 mL, 10 mL, Intravenous, PRN, Manav Schneider MD    albuterol-ipratropium, benzonatate, dextrose 50 % in water (D50W), glucagon (human recombinant), glucose, glucose, heparin (porcine), heparin (porcine), heparin (PORCINE), heparin (PORCINE), hydrALAZINE, insulin aspart U-100, melatonin, promethazine, sodium chloride 0.9%      Review of Systems   Constitutional: Negative for diaphoresis and fever.   Cardiovascular:  Negative for chest pain, dyspnea on exertion, leg swelling, near-syncope, orthopnea, palpitations, paroxysmal nocturnal dyspnea and syncope.   Respiratory:  Negative for cough and shortness of breath.    Gastrointestinal:  Negative for abdominal pain, diarrhea, nausea and vomiting.   Neurological:  Negative for light-headedness.   Psychiatric/Behavioral:  Negative for altered mental status and substance abuse.      Objective:     Vital Signs (Most Recent):  Temp: 98.5 °F (36.9 °C) (01/02/24 0730)  Pulse: 86 (01/02/24 0815)  Resp: 17 (01/02/24 0730)  BP: (!) 149/61  (01/02/24 0815)  SpO2: 97 % (01/02/24 0340) Vital Signs (24h Range):  Temp:  [97.8 °F (36.6 °C)-100 °F (37.8 °C)] 98.5 °F (36.9 °C)  Pulse:  [] 86  Resp:  [16-20] 17  SpO2:  [95 %-98 %] 97 %  BP: (121-160)/(59-83) 149/61     Weight: 78.5 kg (173 lb)  Body mass index is 27.1 kg/m².     SpO2: 97 %     Telemetry: Normal sinus rhythm (90?), PVCs overnight.       Physical Exam  Constitutional:       Appearance: Normal appearance.   Cardiovascular:      Rate and Rhythm: Normal rate and regular rhythm.      Pulses: Normal pulses.      Heart sounds: Normal heart sounds.   Pulmonary:      Effort: Pulmonary effort is normal.      Breath sounds: Normal breath sounds. No wheezing or rales.   Abdominal:      General: Abdomen is flat. There is no distension.      Palpations: Abdomen is soft.      Tenderness: There is no abdominal tenderness.   Musculoskeletal:      Right lower leg: No edema.      Left lower leg: No edema.   Skin:     General: Skin is warm and dry.   Neurological:      Mental Status: She is alert and oriented to person, place, and time. Mental status is at baseline.   Psychiatric:         Mood and Affect: Mood normal.         Behavior: Behavior normal.            Significant Labs:     Recent Labs   Lab 12/31/23  0410 01/01/24  0311 01/02/24  0508   WBC 6.19 6.58 5.91   HGB 7.8* 7.6* 7.6*   HCT 24.7* 23.9* 22.6*   * 163 148*         Recent Labs   Lab 12/29/23  0004 12/30/23  0705 12/31/23  0410 01/01/24  0310 01/02/24  0508   *  135*   < > 137 136 133*   K 4.1  4.1   < > 4.0 4.7 5.0   CL 97  97   < > 99 99 101   CO2 26  26   < > 24 23 19*   BUN 21*  21*   < > 18 28* 34*   CREATININE 4.1*  4.1*   < > 4.4* 6.3* 7.2*   CALCIUM 7.6*  7.6*   < > 8.5* 8.4* 8.4*   PHOS 3.5  --   --   --   --     < > = values in this interval not displayed.         Recent Labs   Lab 12/31/23  0410 01/01/24  0310 01/02/24  0508   ALKPHOS 53* 59 57   BILITOT 0.3 0.3 0.3   PROT 5.9* 6.1 6.2   ALT 6* 9* 7*   AST 10 12  10         Significant Imaging:     JAVY 12/29/23    JAVY performed for infective endocarditis evaluation. Study aborted early due to clinical status.    Left Ventricle: The left ventricle is normal in size. Normal wall thickness. Normal wall motion.    Right Ventricle: Normal right ventricular cavity size. Systolic function is normal. Pacemaker lead present in the ventricle.    Left Atrium: Left atrium is dilated. There is no thrombus in the left atrial cavity.    Right Atrium: Lead present in the right atrium. A large 0.7 x 2.6 cm mobile vegetation is attached to the ventricular lead within the right atrium. It does not appear to be attached to the tricuspid valve leaflets.     TTE 12/29/23    Left Ventricle: The left ventricle is normal in size. Increased ventricular mass. Increased wall thickness. There is concentric hypertrophy. Normal wall motion. There is normal systolic function with a visually estimated ejection fraction of 60 - 65%. Grade I diastolic dysfunction.    Right Ventricle: Normal right ventricular cavity size. Systolic function is normal. Pacemaker lead present in the ventricle with a 0.7 x 1.8 cm mobile, echogenic structure consistent with a vegetation.    Left Atrium: Left atrium is moderately dilated.    Right Atrium: Right atrium is mildly dilated.    Mitral Valve: The mitral valve is repaired by MitraClip. There is mild regurgitation.    Tricuspid Valve: The tricuspid valve is structurally normal. There is moderate regurgitation. The vegetation on the ventricular lead appears adjacent to but not adherent to the septal leaflet.    Pulmonary Artery: There is pulmonary hypertension. The estimated pulmonary artery systolic pressure is 76 mmHg.    IVC/SVC: Elevated venous pressure at 15 mmHg.    Pericardium: There is a trivial posterior effusion. No indication of cardiac tamponade.       Assessment and Plan:     * MRSA bacteremia  #CRT-D in place  #Infection associated with cardiac device  58yoF  with T1DM, ESRD on HD, Cad s/p PCI, iCM with prior EF 25-30% recovered to 55-60% with CRT-D in place (dual-coiled 2018 [employer] CRT-D), severe MR s/p MitraClip, and chronic thrombocytopenia is here as a transfer with bacteremia, vegetation involving her RV lead per outside JAVY. Unable to get good visualization with current imaging. Discussed with echo staff regarding need for further imaging lead extraction.     TTE with 0.7cmx1.8cm vegetation without evidence of TV involvement  JAVY agreed, no TV involvement. Measurement of vegetation on JAVY 0.7 x 2.6 cm  Bcx MRSA +  HD per groin trialysis (R Fem)  Formal interrogation showed she is not device-dependent    Recommendations:  - Planning for lead extraction this week pending schedule availability  - CTS consulted for backup for procedure        Connor M Gillies, MD  Cardiac Electrophysiology  [facility] - [facility] Step[facility]

## 2024-01-02 NOTE — ASSESSMENT & PLAN NOTE
Ellen Roman is 58 year old female with a medical history significant for T1DM c/b gastroparesis, retinopathy and nephropathy - ESRD on HD, HTN, HLD, CAD s/p prior PCI with ischemic cardiomyopathy s/p AICD placement, chronic thrombocytopenia, severe mitral regurgitation status post MitraClip, history of fungemia/bacteremia initially presenting to [facility] ER on 12/18 with a complaints of right-sided chest pain associated with nausea and vomiting multiple episodes for the prior 2 days. Course was then complicated by refractory MRSA bacteremia.  Due to persistent bacteremia despite vancomycin, patient subsequently had JAVY 12/27/23 with findings reported as positive for vegetations present on the ICD lead wire. As per OS cardiologist, requested transfer from [facility] to [facility] [place]. 2018 CRT-D [employer]. CTS is being consulted for back up for ICD extraction secondary to vegetations present on ICD lead.     CTS will offer back up

## 2024-01-02 NOTE — HPI
Ellen Roman is 58 year old female with a medical history significant for T1DM c/b gastroparesis, retinopathy and nephropathy - ESRD on HD, HTN, HLD, CAD s/p prior PCI with ischemic cardiomyopathy s/p AICD placement, chronic thrombocytopenia, severe mitral regurgitation status post MitraClip, history of fungemia/bacteremia initially presenting to [facility] ER on 12/18 with a complaints of right-sided chest pain associated with nausea and vomiting multiple episodes for the prior 2 days.      She also reported low-grade temperature. Patient reports generalized weakness. Cardiology performed LHC on 12/20/2023 for evaluation of this chest pain with recommendation for medical management for coronary artery disease. Course is then complicated by refractory MRSA bacteremia.  team initially suspected source of bacteremia was a tunneled dialysis catheter. This was removed upon admission and patient had placement of left IJ.  Due to persistent bacteremia despite vancomycin, patient subsequently had JAVY 12/27/23 with findings reported as positive for vegetations present on the ICD lead wire. As per OS cardiologist, requested transfer from [facility] to [facility] [place]. 2018 CRT-D [email or web address].     CTS is being consulted for back up for ICD extraction secondary to vegetations present on ICD lead.

## 2024-01-02 NOTE — ASSESSMENT & PLAN NOTE
ESRD on iHD MWF  [facility]  4 hour treatments  EDW of 73 kg    Temporary line to R femoral, reported that patient has thrombosed RIJ where TDC was previously.  AICD to the L.    Plan/Recommendations:  -Undergoing HD now.   -Defer tunneled dialysis catheter placement at this time, will need prior to discharge home.  Likely will need to be placed to the femoral area.  -discussed with patient, will need to work on long term dialysis access (AVF vs AVG) as OP, reports that she was trying to get arranged for peritoneal dialysis.    -renal diet when advanced, 1.5L fluid restrictions  -will continue following for HD needs while IP.

## 2024-01-02 NOTE — ASSESSMENT & PLAN NOTE
BG goal 140-180    - Levemir (Insulin Detemir) 16 units BID  - Novolog (Insulin Aspart) 6 units (0.5 u/kg/day)   - Continue LDC (150/50) prn ac/hs for hyperglycemia given poor renal function   - BG checks AC/HS/0200   - Hypoglycemia protocol in place  - If blood glucose greater than 300, please ask patient not to eat food or drink anything other than water until correctional insulin has brought it back below 250    ** Please notify Endocrine for any change and/or advance in diet**  ** Please call Endocrine for any BG related issues **    Discharge Planning:   TBD. Please notify endocrinology prior to discharge.

## 2024-01-02 NOTE — PROGRESS NOTES
[facility] - [facility] Stepdown  Nephrology  Progress Note    Patient Name: Ellen Roman  MRN: [id number]  Admission Date: 12/28/2023  Hospital Length of Stay: 5 days  Attending Provider: Yamilet Boyce MD   Primary Care Physician: Paco Amanda MD  Principal Problem:MRSA bacteremia    Subjective:     HPI: Ellen Roman is a 59 yo female with hx of DM1, HTN, HLD, CAD s/p PCI with AICD placement, Mitral Regurgitation s/p clip, and ESRD on iHD MWF who presents as a transfer from OSH for higher level of care.  According to records, patient initially presented to OSH on 12/18 with chief complaint of R sided chest pain associated with N/V x 2 days with low grade temp.  Cardiology evaluated there and performed a LCH on 12/20 but no PCI was performed and they recommended medical management for her CAD.  Her BC grew MRSA bacteremia that remained positive during the admission with source suspected to be from her RIJ Tunneled dialysis catheter. The TDC was removed and IR was consulted for tempoary line placement and she was found to have a thrombosed LIJ from her previous catheter and a temporary catheter had to be placed to the R femoral vein.  Her BC continued to remain positive despite treatment with Vancomycin and a JAVY was ordered with findings of vegetations to the AICD lead wire and [place] [facility] [place]      Interval History: Patient seen this morning, on HD. No overnight events.     Review of patient's allergies indicates:  No Known Allergies  Current Facility-Administered Medications   Medication Frequency    0.9%  NaCl infusion Once    albuterol sulfate nebulizer solution 2.5 mg Q4H    albuterol-ipratropium 2.5 mg-0.5 mg/3 mL nebulizer solution 3 mL Q4H PRN    aspirin chewable tablet 81 mg Daily    atorvastatin tablet 40 mg Daily    benzonatate capsule 200 mg Q8H PRN    calcium acetate(phosphat bind) capsule 667 mg TID WM    ceftaroline fosamiL (TEFLARO) 200 mg in  dextrose 5 % (D5W) 50 mL IVPB Q12H    DAPTOmycin (CUBICIN) 785 mg in sodium chloride 0.9% SolP 50 mL IVPB Q48H    dextrose 50 % in water (D50W) injection 50 mL PRN    docusate sodium capsule 50 mg Daily    epoetin john injection 7,900 Units Every Tues, Thurs, Sat    furosemide tablet 80 mg Daily    glucagon (human recombinant) injection 1 mg PRN    glucose chewable tablet 16 g PRN    glucose chewable tablet 24 g PRN    heparin (porcine) injection 1,000 Units PRN    heparin (porcine) injection 3,000 Units Daily PRN    heparin 25,000 units in dextrose 5% (100 units/ml) IV bolus from bag - ADDITIONAL PRN BOLUS - 30 units/kg PRN    heparin 25,000 units in dextrose 5% (100 units/ml) IV bolus from bag - ADDITIONAL PRN BOLUS - 60 units/kg PRN    heparin 25,000 units in dextrose 5% 250 mL (100 units/mL) infusion HIGH INTENSITY nomogram - [facility] Continuous    hydrALAZINE injection 10 mg Q8H PRN    hydrALAZINE tablet 25 mg Q8H    insulin aspart U-100 pen 0-5 Units AC + HS + 0200 PRN    insulin aspart U-100 pen 6 Units TIDWM    insulin detemir U-100 (Levemir) pen 16 Units BID    melatonin tablet 3 mg Nightly PRN    metoprolol succinate (TOPROL-XL) 24 hr tablet 50 mg Daily    pantoprazole EC tablet 40 mg Daily    pregabalin capsule 50 mg Daily    promethazine tablet 25 mg Q6H PRN    sacubitriL-valsartan 24-26 mg per tablet 1 tablet BID    sodium chloride 0.9% flush 10 mL PRN    vancomycin (VANCOCIN) 4 mg, heparin (porcine) 5,000 Units IV catheter lock (total volume 2 mL) Daily     Facility-Administered Medications Ordered in Other Encounters   Medication Frequency    0.9%  NaCl infusion (for blood administration) Q24H PRN    0.9%  NaCl infusion (for blood administration) Q24H PRN    benzocaine 20 % oral spray QID PRN    sodium chloride 0.9% flush 10 mL PRN       Objective:     Vital Signs (Most Recent):  Temp: 98.5 °F (36.9 °C) (01/02/24 0730)  Pulse: 86 (01/02/24 0830)  Resp: 17 (01/02/24 0730)  BP: 133/63 (01/02/24 0830)  SpO2:  97 % (01/02/24 0340) Vital Signs (24h Range):  Temp:  [97.8 °F (36.6 °C)-100 °F (37.8 °C)] 98.5 °F (36.9 °C)  Pulse:  [] 86  Resp:  [16-20] 17  SpO2:  [95 %-98 %] 97 %  BP: (121-160)/(59-83) 133/63     Weight: 78.5 kg (173 lb) (12/29/23 1345)  Body mass index is 27.1 kg/m².  Body surface area is 1.93 meters squared.    I/O last 3 completed shifts:  In: 342 [P.O.:342]  Out: -      Physical Exam  Constitutional:       General: She is not in acute distress.     Appearance: Normal appearance. She is not ill-appearing or toxic-appearing.   HENT:      Head: Atraumatic.      Right Ear: External ear normal.      Left Ear: External ear normal.      Nose: Nose normal.      Mouth/Throat:      Mouth: Mucous membranes are moist.   Eyes:      Extraocular Movements: Extraocular movements intact.   Cardiovascular:      Rate and Rhythm: Normal rate and regular rhythm.      Pulses: Normal pulses.      Heart sounds: Normal heart sounds.   Pulmonary:      Effort: Pulmonary effort is normal.      Breath sounds: Normal breath sounds.   Abdominal:      General: Abdomen is flat.      Palpations: Abdomen is soft.   Musculoskeletal:         General: Normal range of motion.      Right lower leg: No edema.      Left lower leg: No edema.   Skin:     General: Skin is warm.      Capillary Refill: Capillary refill takes less than 2 seconds.   Neurological:      Mental Status: She is alert.          Significant Labs:  CBC:   Recent Labs   Lab 01/02/24  0508   WBC 5.91   RBC 2.52*   HGB 7.6*   HCT 22.6*   *   MCV 90   MCH 30.2   MCHC 33.6     CMP:   Recent Labs   Lab 01/02/24  0508   *   CALCIUM 8.4*   ALBUMIN 2.2*   PROT 6.2   *   K 5.0   CO2 19*      BUN 34*   CREATININE 7.2*   ALKPHOS 57   ALT 7*   AST 10   BILITOT 0.3     All labs within the past 24 hours have been reviewed.     Significant Imaging:    Assessment/Plan:     Renal/  Chronic kidney disease-mineral and bone disorder  -low phos diet  -continue  calcium acetate    ESRD (end stage renal disease)  ESRD on iHD MWF  [facility]  4 hour treatments  EDW of 73 kg    Temporary line to R femoral, reported that patient has thrombosed RIJ where TDC was previously.  AICD to the L.    Plan/Recommendations:  -Undergoing HD now.   -Defer tunneled dialysis catheter placement at this time, will need prior to discharge home.  Likely will need to be placed to the femoral area.  -discussed with patient, will need to work on long term dialysis access (AVF vs AVG) as OP, reports that she was trying to get arranged for peritoneal dialysis.    -renal diet when advanced, 1.5L fluid restrictions  -will continue following for HD needs while IP.    ID  * MRSA bacteremia  -MRSA Bacteremia  -RIJ TDC removed @ OSH, BC remained positive  -JAVY performed, vegetations to AICD lead.      Oncology  Anemia due to stage 5 chronic kidney disease, not on chronic dialysis  Epo with HD  -defer iron therapy with bacteremia        Thank you for your consult. I will follow-up with patient. Please contact us if you have any additional questions.    Obdulio Cole MD  Nephrology  [facility] - [facility] Stepdown

## 2024-01-02 NOTE — SUBJECTIVE & OBJECTIVE
"Interval HPI:   No acute events overnight. Patient in room 322/322 A. Blood glucose stable. BG at and above goal on current insulin regimen (SSI, prandial, and basal insulin ). Steroid use- None.   4 Days Post-Op  Renal function- Abnormal - 7.2 cr    Vasopressors-  None     Diet diabetic Cardiac (Low Na/Chol); 2000 Calorie; Fluid - 1500mL     Eatin%   Nausea: No  Hypoglycemia and intervention: No  Fever: No  TPN and/or TF: No    BP (!) 150/71 (BP Location: Right arm, Patient Position: Lying)   Pulse 96   Temp 98.6 °F (37 °C) (Oral)   Resp 18   Ht 5' 7" (1.702 m)   Wt 78.5 kg (173 lb)   LMP  (LMP Unknown)   SpO2 (!) 94%   BMI 27.10 kg/m²     Labs Reviewed and Include    Recent Labs   Lab 24  0508   *   CALCIUM 8.4*   ALBUMIN 2.2*   PROT 6.2   *   K 5.0   CO2 19*      BUN 34*   CREATININE 7.2*   ALKPHOS 57   ALT 7*   AST 10   BILITOT 0.3     Lab Results   Component Value Date    WBC 5.91 2024    HGB 7.6 (L) 2024    HCT 22.6 (L) 2024    MCV 90 2024     (L) 2024     No results for input(s): "TSH", "FREET4" in the last 168 hours.  Lab Results   Component Value Date    HGBA1C 8.0 (H) 2023       Nutritional status:   Body mass index is 27.1 kg/m².  Lab Results   Component Value Date    ALBUMIN 2.2 (L) 2024    ALBUMIN 2.2 (L) 2024    ALBUMIN 2.1 (L) 2023     No results found for: "PREALBUMIN"    Estimated Creatinine Clearance: 9.3 mL/min (A) (based on SCr of 7.2 mg/dL (H)).    Accu-Checks  Recent Labs     23  2325 23  1319 23  1657 23  2040 24  0758 24  1114 24  1502 24  0806 24  1351   POCTGLUCOSE 234* 163* 161* 192* 223* 251* 206* 177* 192* 246*       Current Medications and/or Treatments Impacting Glycemic Control  Immunotherapy:    Immunosuppressants       None          Steroids:   Hormones (From admission, onward)      Start     Stop Route Frequency " Ordered    12/29/23 0020  melatonin tablet 3 mg         -- Oral Nightly PRN 12/28/23 2331          Pressors:    Autonomic Drugs (From admission, onward)      None          Hyperglycemia/Diabetes Medications:   Antihyperglycemics (From admission, onward)      Start     Stop Route Frequency Ordered    01/01/24 2100  insulin detemir U-100 (Levemir) pen 16 Units         -- SubQ 2 times daily 01/01/24 1223    12/31/23 1200  insulin aspart U-100 pen 0-5 Units         -- SubQ Before meals, nightly and at 0200 PRN 12/31/23 1151    12/31/23 0745  insulin aspart U-100 pen 6 Units         -- SubQ 3 times daily with meals 12/31/23 0726

## 2024-01-02 NOTE — ASSESSMENT & PLAN NOTE
#CRT-D in place  #Infection associated with cardiac device  58yoF with T1DM, ESRD on HD, Cad s/p PCI, iCM with prior EF 25-30% recovered to 55-60% with CRT-D in place (dual-coiled 2018 [employer] CRT-D), severe MR s/p MitraClip, and chronic thrombocytopenia is here as a transfer with bacteremia, vegetation involving her RV lead per outside JAVY. Unable to get good visualization with current imaging. Discussed with echo staff regarding need for further imaging lead extraction.     TTE with 0.7cmx1.8cm vegetation without evidence of TV involvement  JAVY agreed, no TV involvement. Measurement of vegetation on JAVY 0.7 x 2.6 cm  Bcx MRSA +  HD per groin trialysis (R Fem)  Formal interrogation showed she is not device-dependent    Recommendations:  - Tentative lead extraction 1/4/24  - CTS consulted, procedure pending availability for back up  - Heparin off 6 hours prior to procedure

## 2024-01-02 NOTE — ASSESSMENT & PLAN NOTE
#CRT-D in place  #Infection associated with cardiac device  58yoF with T1DM, ESRD on HD, Cad s/p PCI, iCM with prior EF 25-30% recovered to 55-60% with CRT-D in place (dual-coiled 2018 [employer] CRT-D), severe MR s/p MitraClip, and chronic thrombocytopenia is here as a transfer with bacteremia, vegetation involving her RV lead per outside JAVY. Unable to get good visualization with current imaging. Discussed with echo staff regarding need for further imaging lead extraction.     TTE with 0.7cmx1.8cm vegetation without evidence of TV involvement  JAVY agreed, no TV involvement. Measurement of vegetation on JAVY 0.7 x 2.6 cm  Bcx MRSA +  HD per groin trialysis (R Fem)  Formal interrogation showed she is not device-dependent    Recommendations:  - Plan for lead extraction early this week  - Will plan for L femoral approach  - CTS consulted for backup for procedure

## 2024-01-02 NOTE — SUBJECTIVE & OBJECTIVE
Current Facility-Administered Medications on File Prior to Encounter   Medication    0.9%  NaCl infusion (for blood administration)    0.9%  NaCl infusion (for blood administration)    benzocaine 20 % oral spray    sodium chloride 0.9% flush 10 mL     Current Outpatient Medications on File Prior to Encounter   Medication Sig    alirocumab (PRALUENT PEN) 150 mg/mL PnIj Inject 1 mL (150 mg total) into the skin every 14 (fourteen) days.    atorvastatin (LIPITOR) 40 MG tablet Take 1 tablet by mouth nightly.    calcitRIOL (ROCALTROL) 0.25 MCG Cap Take 1 capsule by mouth once daily.    cloNIDine (CATAPRES) 0.3 MG tablet Take 1 tablet by mouth 2 (two) times daily.    clopidogreL (PLAVIX) 75 mg tablet Take 1 tablet by mouth once daily.    diphenhydrAMINE (BENADRYL) 25 mg capsule Take 1 capsule (25 mg total) by mouth nightly as needed for Itching.    doxazosin (CARDURA) 8 MG Tab Take 1 tablet by mouth nightly.    ENTRESTO  mg per tablet TAKE 1 TABLET(S) BY MOUTH TWO TIMES A DAY    famotidine (PEPCID) 20 MG tablet Take 1 tablet by mouth once daily.    furosemide (LASIX) 20 MG tablet Take 1 tablet by mouth once daily.    hydrALAZINE (APRESOLINE) 50 MG tablet Take 1 tablet by mouth 2 (two) times a day (morning and before bed).    metoprolol succinate (TOPROL-XL) 100 MG 24 hr tablet Take 1 tablet by mouth once daily.    metroNIDAZOLE (FLAGYL) 500 MG tablet Take 1 tablet in the morning and 1 tablet before bedtime for 7 days.    NIFEdipine (PROCARDIA-XL) 60 MG (OSM) 24 hr tablet Take 1 tablet by mouth once daily.    ondansetron (ZOFRAN-ODT) 4 MG TbDL Take 1 tablet by mouth.    ondansetron (ZOFRAN-ODT) 4 MG TbDL Take 1 tablet by mouth every 8 (eight) hours as needed for up to 7 days.    pantoprazole (PROTONIX) 40 MG tablet Take 1 tablet by mouth once daily.    prochlorperazine (COMPAZINE) 10 MG tablet Take 1 tablet by mouth every 6 (six) hours as needed for up to 7 days.    promethazine (PHENERGAN) 25 MG tablet Take 1 tablet  by mouth 4 (four) times daily as needed for nausea for up to 7 days    thiamine (VITAMIN B-1) 100 MG tablet Take 1 tablet (100 mg total) by mouth once daily.    vancomycin (VANCOCIN) 125 MG capsule Take 1 capsule by mouth in the morning and 1capsule by mouth at noon and 1 capsule by mouth in the evening and 1 capsule by mouth before bedtime for 10 days.    amoxicillin-clavulanate 500-125mg (AUGMENTIN) 500-125 mg Tab Take 1 tablet by mouth every morning for 2 days    blood sugar diagnostic (TRUE METRIX GLUCOSE TEST STRIP MISC) True Metrix Glucose Test Strip   USE TO TEST BLOOD SUGAR 3 TIMES DAILY    blood sugar diagnostic Strp Use to test blood sugar 3 (three) times daily before meals.    blood-glucose meter Misc Use as directed    blood-glucose meter Misc Use to test blood sugar as directed    blood-glucose sensor (DEXCOM G7 SENSOR) Gini Apply one to skin as directed and change q 10 days.    bumetanide (BUMEX) 2 MG tablet Take 1 tablet by mouth once daily.    calcitRIOL (ROCALTROL) 0.25 MCG Cap Take 1 capsule by mouth once daily.    calcium acetate,phosphat bind, (PHOSLO) 667 mg tablet Take 1 tablet by mouth in the morning and 1 tablet at noon and 1 tablet in the evening with meals.    epoetin john (PROCRIT) 20,000 unit/mL injection Inject 1 ml under the skin every two weeks. Administer only if hemoglobin is <10.5.    insulin glargine 100 units/mL SubQ pen Inject 30 Units into the skin nightly.    insulin lispro 100 unit/mL injection Inject 5 Units into the skin 3 (three) times daily with meals. Discard vial 28 days after opening.    insulin lispro 100 unit/mL injection Inject 0-20 Units into the skin 4 (four) times daily before meals and nightly. If BS : 0 units, -200: 4 units, -250: 8 units; -300: 12 units, -350: 16 units, -400: 20 units, BS > 400: 20 units and call your doctor    insulin lispro protamin-lispro (HUMALOG MIX 50-50 KWIKPEN) 100 unit/mL (50-50) InPn Inject 10 Units  "into the skin 3 (three) times daily before meals.    metoclopramide HCl (REGLAN) 10 MG tablet Take 1 tablet by mouth 4 (four) times daily before meals and nightly.    nitroGLYCERIN (NITROSTAT) 0.4 MG SL tablet Place 1 tablet under the tongue every 5 (five) minutes as needed for chest pain.  Max of 3 tablets in a 15 minute period.    venlafaxine (EFFEXOR-XR) 37.5 MG 24 hr capsule Take 1 capsule by mouth daily    [DISCONTINUED] aspirin 81 MG Chew Take 81 mg by mouth.    [DISCONTINUED] folic acid (FOLVITE) 1 MG tablet Take 5 tablets by mouth daily for 10 days, THEN take 1 tablet daily for 80 days    [DISCONTINUED] insulin syringe-needle U-100 1 mL 31 gauge x 15/64" Syrg Inject 1 Syringe into the skin once daily.    [DISCONTINUED] lancets (ONETOUCH ULTRASOFT LANCETS) Misc Use 1 lancet to check blood sugar 4 (four) times daily.    [DISCONTINUED] losartan (COZAAR) 100 MG tablet Take 1 tablet  by mouth nightly for blood pressure.       Review of patient's allergies indicates:  No Known Allergies    Past Medical History:   Diagnosis Date    Acute hepatitis A     Acute kidney injury 03/07/2018    Acute respiratory failure with hypoxemia 03/13/2020    Arthritis     Cataract     No date or laterality given    Chronic systolic CHF (congestive heart failure), NYHA class 3     Coronary artery disease     Diabetes mellitus     Dialysis patient     Dilated cardiomyopathy     Diverticulosis of intestine 10/02/2013    Gastroparesis 02/24/2014    Hypertension     ICD (implantable cardioverter-defibrillator), biventricular, in situ     Ischemic cardiomyopathy 04/29/2018    Left bundle branch block 04/29/2018    Microcytic hypochromic anemia 03/07/2018    Migraine 01/24/2016    Presence of drug coated stent in LAD coronary artery     A SHU stent was placed to the mid D#2 in 7-2016, and then into the ostium of the D#2 in 1-2018, and then to the prox-mid D#2 in 3-2017    Presence of drug coated stent in left circumflex coronary artery " 01/20/2017    A SHU stent was placed to the proximal OM 1 branch in January of 2017    Severe mitral regurgitation 04/12/2018     Past Surgical History:   Procedure Laterality Date    ABLATION OF ARRHYTHMOGENIC FOCUS FOR SUPRAVENTRICULAR TACHYCARDIA WITH ELECTROPHYSIOLOGY STUDY N/A 10/06/2021    Procedure: Ablation SVT;  Surgeon: Joby Gutiérrez MD;  Location: [facility] EP LAB;  Service: Cardiology;  Laterality: N/A;    ANGIOGRAM, CORONARY, WITH LEFT HEART CATHETERIZATION  04/05/2022    Procedure: Angiogram, Coronary, with Left Heart Cath;  Surgeon: Manav Schneider MD;  Location: [facility] CATH LAB;  Service: Cardiology;;    BREAST BIOPSY Left     BREAST SURGERY Left     left biopsy    CARDIAC CATHETERIZATION      CARDIAC DEFIBRILLATOR PLACEMENT      CARDIAC ELECTROPHYSIOLOGY STUDY WITH DRUG CHALLENGE  10/06/2021    Procedure: EP Drug challenge;  Surgeon: Joby Gutiérrez MD;  Location: [facility] EP LAB;  Service: Cardiology;;    CATHETERIZATION OF BOTH LEFT AND RIGHT HEART N/A 12/10/2019    Procedure: CATHETERIZATION, HEART, BOTH LEFT AND RIGHT;  Surgeon: LUIS ALFREDO Ernandez MD;  Location: [facility] CATH LAB;  Service: Cardiology;  Laterality: N/A;    ECHOCARDIOGRAM,TRANSESOPHAGEAL N/A 12/29/2023    Procedure: Transesophageal echo (JAVY) intra-procedure log documentation;  Surgeon: Provider, Dosc Diagnostic;  Location: [facility] EP LAB;  Service: Cardiology;  Laterality: N/A;    TONSILLECTOMY      TRANSESOPHAGEAL ECHOCARDIOGRAPHY  07/11/2016    TRANSESOPHAGEAL ECHOCARDIOGRAPHY N/A 01/10/2022    Procedure: ECHOCARDIOGRAM, TRANSESOPHAGEAL;  Surgeon: Leslie Fishman MD;  Location: [facility] CATH LAB;  Service: Cardiology;  Laterality: N/A;    TRANSESOPHAGEAL ECHOCARDIOGRAPHY N/A 8/17/2022    Procedure: ECHOCARDIOGRAM, TRANSESOPHAGEAL;  Surgeon: Manav Schneider MD;  Location: [facility] CATH LAB;  Service: Cardiology;  Laterality: N/A;    TUBAL LIGATION      VASCULAR SURGERY       Family History       Problem Relation (Age of Onset)    Colon cancer Father     Heart disease Mother    Hypertension Mother, Brother, Brother    Kidney cancer Sister    Leukemia Sister          Tobacco Use    Smoking status: Never    Smokeless tobacco: Never   Substance and Sexual Activity    Alcohol use: Not Currently    Drug use: Never    Sexual activity: Yes     Partners: Male     Review of Systems   Constitutional:  Negative for activity change, appetite change, fatigue and fever.   HENT:  Negative for nosebleeds.    Respiratory:  Negative for cough and shortness of breath.    Cardiovascular:  Negative for chest pain, palpitations and leg swelling.   Gastrointestinal:  Negative for abdominal distention, abdominal pain and nausea.   Genitourinary:  Negative for frequency.   Musculoskeletal:  Negative for arthralgias and myalgias.   Skin:  Negative for rash.   Neurological:  Negative for dizziness and numbness.   Hematological:  Does not bruise/bleed easily.     Objective:     Vital Signs (Most Recent):  Temp: 98.5 °F (36.9 °C) (01/02/24 0730)  Pulse: 90 (01/02/24 0738)  Resp: 17 (01/02/24 0730)  BP: (!) 160/83 (01/02/24 0738)  SpO2: 97 % (01/02/24 0340) Vital Signs (24h Range):  Temp:  [97.8 °F (36.6 °C)-100.2 °F (37.9 °C)] 98.5 °F (36.9 °C)  Pulse:  [] 90  Resp:  [16-20] 17  SpO2:  [95 %-98 %] 97 %  BP: (121-160)/(59-83) 160/83     Weight: 78.5 kg (173 lb)  Body mass index is 27.1 kg/m².    SpO2: 97 %        Intake/Output - Last 3 Shifts         12/31 0700  01/01 0659 01/01 0700 01/02 0659 01/02 0700 01/03 0659    P.O. 458 342     Other       Total Intake(mL/kg) 458 (5.8) 342 (4.4)     Other       Total Output       Net +458 +342            Urine Occurrence 5 x 2 x              Lines/Drains/Airways       Peripherally Inserted Central Catheter Line  Duration             (RETIRED) PICC Line - Triple Lumen 12/21/23 2200 11 days              Central Venous Catheter Line  Duration             Trialysis (Dialysis) Catheter 12/27/23 0913 right femoral 5 days              Peripheral  Intravenous Line  Duration                  Peripheral IV - Single Lumen 01/01/24 1018 20 G Anterior;Left Forearm <1 day                          Physical Exam  HENT:      Head: Normocephalic and atraumatic.   Eyes:      Extraocular Movements: Extraocular movements intact.   Cardiovascular:      Rate and Rhythm: Normal rate and regular rhythm.   Pulmonary:      Effort: Pulmonary effort is normal.   Abdominal:      General: Abdomen is flat.      Palpations: Abdomen is soft.   Musculoskeletal:         General: Normal range of motion.      Cervical back: Normal range of motion.      Comments: R FEM trialysis   Skin:     General: Skin is warm and dry.      Capillary Refill: Capillary refill takes less than 2 seconds.   Neurological:      General: No focal deficit present.            Significant Labs:  BMP:   Recent Labs   Lab 01/02/24  0508   *   *   K 5.0      CO2 19*   BUN 34*   CREATININE 7.2*   CALCIUM 8.4*   MG 1.9     CBC:   Recent Labs   Lab 01/02/24  0508   WBC 5.91   RBC 2.52*   HGB 7.6*   HCT 22.6*   *   MCV 90   MCH 30.2   MCHC 33.6     CMP:   Recent Labs   Lab 01/02/24  0508   *   CALCIUM 8.4*   ALBUMIN 2.2*   PROT 6.2   *   K 5.0   CO2 19*      BUN 34*   CREATININE 7.2*   ALKPHOS 57   ALT 7*   AST 10   BILITOT 0.3     Coagulation:   Recent Labs   Lab 01/02/24  0508   APTT 39.3*       Significant Diagnostics:   JAVY: 12/29/2023    JAVY performed for infective endocarditis evaluation. Study aborted early due to clinical status.    Left Ventricle: The left ventricle is normal in size. Normal wall thickness. Normal wall motion.    Right Ventricle: Normal right ventricular cavity size. Systolic function is normal. Pacemaker lead present in the ventricle.    Left Atrium: Left atrium is dilated. There is no thrombus in the left atrial cavity.    Right Atrium: Lead present in the right atrium. A large 0.7 x 2.6 cm mobile vegetation is attached to the ventricular lead  within the right atrium. It does not appear to be attached to the tricuspid valve leaflets.  I have reviewed and interpreted all pertinent imaging results/findings within the past 24 hours.

## 2024-01-02 NOTE — SUBJECTIVE & OBJECTIVE
Interval History: TAYLOR    Review of Systems   Constitutional:  Negative for fever.   HENT:  Negative for trouble swallowing.    Respiratory:  Negative for cough.    Cardiovascular:  Negative for chest pain.   Gastrointestinal:  Negative for abdominal pain, diarrhea and vomiting.   Musculoskeletal:  Negative for arthralgias and back pain.   Psychiatric/Behavioral:  Negative for confusion.      Objective:     Vital Signs (Most Recent):  Temp: 98.6 °F (37 °C) (01/02/24 1117)  Pulse: 86 (01/02/24 1117)  Resp: 18 (01/02/24 1117)  BP: 133/64 (01/02/24 1117)  SpO2: 97 % (01/02/24 0340) Vital Signs (24h Range):  Temp:  [97.8 °F (36.6 °C)-99.5 °F (37.5 °C)] 98.6 °F (37 °C)  Pulse:  [] 86  Resp:  [16-20] 18  SpO2:  [96 %-97 %] 97 %  BP: (108-160)/(56-83) 133/64     Weight: 78.5 kg (173 lb)  Body mass index is 27.1 kg/m².    Estimated Creatinine Clearance: 9.3 mL/min (A) (based on SCr of 7.2 mg/dL (H)).     Physical Exam  Constitutional:       Appearance: Normal appearance. She is not ill-appearing or toxic-appearing.   HENT:      Head: Normocephalic and atraumatic.      Nose: Nose normal.      Mouth/Throat:      Mouth: Mucous membranes are moist.      Pharynx: Oropharynx is clear.   Eyes:      Conjunctiva/sclera: Conjunctivae normal.   Cardiovascular:      Rate and Rhythm: Normal rate and regular rhythm.      Pulses: Normal pulses.      Heart sounds: Normal heart sounds.   Pulmonary:      Effort: Pulmonary effort is normal.      Breath sounds: Normal breath sounds.   Abdominal:      General: Bowel sounds are normal.      Palpations: Abdomen is soft.   Musculoskeletal:         General: No swelling or deformity.      Cervical back: Neck supple.   Skin:     General: Skin is warm and dry.      Comments: R femoral HD line site c/d/i   Neurological:      Mental Status: She is alert and oriented to person, place, and time. Mental status is at baseline.   Psychiatric:         Behavior: Behavior normal.         Thought Content:  Thought content normal.          Significant Labs:   Microbiology Results (last 7 days)       Procedure Component Value Units Date/Time    Blood culture [[id number]] Collected: 01/01/24 0856    Order Status: Completed Specimen: Blood from Antecubital, Right Updated: 01/02/24 1212     Blood Culture, Routine No Growth to date      No Growth to date    Blood culture [[id number]] Collected: 01/01/24 0856    Order Status: Completed Specimen: Blood Updated: 01/02/24 1212     Blood Culture, Routine No Growth to date      No Growth to date    Blood culture [[id number]]  (Abnormal) Collected: 12/30/23 1739    Order Status: Completed Specimen: Blood Updated: 01/02/24 0716     Blood Culture, Routine Gram stain marcella bottle: Gram positive cocci in clusters resembling Staph      Positive results previously called 12/31/2023  22:59      Gram stain aer bottle: Gram positive cocci in clusters resembling Staph      METHICILLIN RESISTANT STAPHYLOCOCCUS AUREUS  ID consult required at [facility].  For susceptibility see order #[id number]      Blood culture [[id number]]  (Abnormal) Collected: 12/30/23 1741    Order Status: Completed Specimen: Blood Updated: 01/02/24 0714     Blood Culture, Routine Gram stain aer bottle: Gram positive cocci in clusters resembling Staph      Positive results previously called 01/01/2024  01:12      Gram stain marcella bottle: Gram positive cocci in clusters resembling Staph      Positive results previously called 01/01/2024  09:19      METHICILLIN RESISTANT STAPHYLOCOCCUS AUREUS  ID consult required at [facility].  For susceptibility see order #[id number]      Blood culture [[id number]]  (Abnormal) Collected: 12/29/23 0004    Order Status: Completed Specimen: Blood Updated: 01/01/24 0831     Blood Culture, Routine Gram stain aer bottle: Gram positive cocci in clusters resembling Staph      Positive results previously called 12/30/2023      METHICILLIN  RESISTANT STAPHYLOCOCCUS AUREUS  ID consult required at [facility] and [facility].  For susceptibility see order # [id number]      Blood culture [[id number]]  (Abnormal)  (Susceptibility) Collected: 12/29/23 0004    Order Status: Completed Specimen: Blood Updated: 01/01/24 0831     Blood Culture, Routine Gram stain aer bottle: Gram positive cocci in clusters resembling Staph      Results called to and read back by: Mary Lopez RN 12/30/2023  09:26      METHICILLIN RESISTANT STAPHYLOCOCCUS AUREUS  ID consult required at [facility] and [facility] locations.      MRSA/SA Rapid ID by PCR from Blood culture [[id number]]  (Abnormal) Collected: 12/29/23 0004    Order Status: Completed Updated: 12/30/23 1037     Staph aureus ID by PCR Positive     Methicillin Resistant ID by PCR Positive          All pertinent labs within the past 24 hours have been reviewed.  Recent Lab Results         01/02/24  0806   01/02/24  0508   01/01/24 2025 01/01/24  1502        Albumin   2.2           ALP   57           ALT   7           Anion Gap   13           aPTT   39.3  Comment: Refer to local heparin nomogram for intensity/dose specific   therapeutic   range.             AST   10           Baso #   0.02           Basophil %   0.3           BILIRUBIN TOTAL   0.3  Comment: For infants and newborns, interpretation of results should be based  on gestational age, weight and in agreement with clinical  observations.    Premature Infant recommended reference ranges:  Up to 24 hours.............<8.0 mg/dL  Up to 48 hours............<12.0 mg/dL  3-5 days..................<15.0 mg/dL  6-29 days.................<15.0 mg/dL             BUN   34           Calcium   8.4           Chloride   101           CO2   19           Creatinine   7.2           Differential Method   Automated           eGFR   6.1           Eos #   0.2           Eosinophil %   3.9           Glucose   167           Gran # (ANC)   4.2           Gran %    71.7           Hematocrit   22.6           Hemoglobin   7.6           Immature Grans (Abs)   0.09  Comment: Mild elevation in immature granulocytes is non specific and   can be seen in a variety of conditions including stress response,   acute inflammation, trauma and pregnancy. Correlation with other   laboratory and clinical findings is essential.             Immature Granulocytes   1.5           Lymph #   0.9           Lymph %   15.2           Magnesium    1.9           MCH   30.2           MCHC   33.6           MCV   90           Mono #   0.4           Mono %   7.4           MPV   11.3           nRBC   0           Platelet Count   148           POCT Glucose 192     177   206       Potassium   5.0           PROTEIN TOTAL   6.2           RBC   2.52           RDW   16.9           Sodium   133           WBC   5.91                   Significant Imaging: I have reviewed all pertinent imaging results/findings within the past 24 hours.

## 2024-01-02 NOTE — SUBJECTIVE & OBJECTIVE
Interval History: No acute issues, most recent blood cultures still positive. JAVY with large thrombus in RA on RV wire (not involving TV).      Current Facility-Administered Medications:     0.9%  NaCl infusion, , Intravenous, Once, Verónica Abdi DNP, FNP-C, Held at 01/02/24 0000    albuterol sulfate nebulizer solution 2.5 mg, 2.5 mg, Nebulization, Q4H, Kathryn More PA-C, 2.5 mg at 01/02/24 0324    albuterol-ipratropium 2.5 mg-0.5 mg/3 mL nebulizer solution 3 mL, 3 mL, Nebulization, Q4H PRN, Kathryn More PA-C    aspirin chewable tablet 81 mg, 81 mg, Oral, Daily, Bhupendra Kumar MD, 81 mg at 01/01/24 1212    atorvastatin tablet 40 mg, 40 mg, Oral, Daily, Bhupendra Kumar MD, 40 mg at 01/01/24 1212    benzonatate capsule 200 mg, 200 mg, Oral, Q8H PRN, Bhupendra Kumar MD    calcium acetate(phosphat bind) capsule 667 mg, 667 mg, Oral, TID WM, Bhupendra Kumar MD, 667 mg at 01/01/24 1212    ceftaroline fosamiL (TEFLARO) 200 mg in dextrose 5 % (D5W) 50 mL IVPB, 200 mg, Intravenous, Q12H, Leigh Ann Harris DO, Stopped at 01/02/24 0623    DAPTOmycin (CUBICIN) 785 mg in sodium chloride 0.9% SolP 50 mL IVPB, 10 mg/kg, Intravenous, Q48H, Karen Ramirez MD, Stopped at 12/31/23 1030    dextrose 50 % in water (D50W) injection 50 mL, 50 mL, Intravenous, PRN, Bhupendra Kumar MD, 50 mL at 12/29/23 2021    docusate sodium capsule 50 mg, 50 mg, Oral, Daily, Yamilet Boyce MD, 50 mg at 01/01/24 1212    epoetin john injection 7,900 Units, 100 Units/kg, Intravenous, Every Tues, Thurs, Sat, Jono Lamas, NP, 7,900 Units at 12/30/23 1102    furosemide tablet 80 mg, 80 mg, Oral, Daily, Yamilet Boyce MD, 80 mg at 01/01/24 1212    glucagon (human recombinant) injection 1 mg, 1 mg, Intramuscular, PRN, Bhupendra Kumar MD    glucose chewable tablet 16 g, 16 g, Oral, PRN, Bhupendra Kumar MD    glucose chewable tablet 24 g, 24 g, Oral, PRN, Bhupendra Kumar MD    heparin (porcine)  injection 1,000 Units, 1,000 Units, Intravenous, PRN, Jono Lamas NP, 1,000 Units at 12/30/23 1136    heparin (porcine) injection 3,000 Units, 3,000 Units, Intra-Catheter, Daily PRN, Bhupendra Kumar MD    heparin 25,000 units in dextrose 5% (100 units/ml) IV bolus from bag - ADDITIONAL PRN BOLUS - 30 units/kg, 30 Units/kg (Adjusted), Intravenous, PRN, Yamilet Boyce MD, 2,052 Units at 12/31/23 0602    heparin 25,000 units in dextrose 5% (100 units/ml) IV bolus from bag - ADDITIONAL PRN BOLUS - 60 units/kg, 60 Units/kg (Adjusted), Intravenous, PRN, Yamilet Boyce MD, 4,104 Units at 12/29/23 1706    heparin 25,000 units in dextrose 5% 250 mL (100 units/mL) infusion HIGH INTENSITY nomogram - [facility], 0-40 Units/kg/hr (Adjusted), Intravenous, Continuous, Yamilet Boyce MD, Last Rate: 15.7 mL/hr at 01/01/24 2121, 23 Units/kg/hr at 01/01/24 2121    hydrALAZINE injection 10 mg, 10 mg, Intravenous, Q8H PRN, Bhupendra Kumar MD    hydrALAZINE tablet 25 mg, 25 mg, Oral, Q8H, Bhupendra Kumar MD, 25 mg at 01/02/24 0130    insulin aspart U-100 pen 0-5 Units, 0-5 Units, Subcutaneous, AC + HS + 0200 PRN, Carissa Rosas PA-C, 2 Units at 01/01/24 1716    insulin aspart U-100 pen 6 Units, 6 Units, Subcutaneous, TIDWM, Carissa Rosas PA-C, 6 Units at 01/01/24 1715    insulin detemir U-100 (Levemir) pen 16 Units, 16 Units, Subcutaneous, BID, Carissa Rosas PA-C, 16 Units at 01/01/24 2027    melatonin tablet 3 mg, 3 mg, Oral, Nightly PRN, Bhupendra Kumar MD    metoprolol succinate (TOPROL-XL) 24 hr tablet 50 mg, 50 mg, Oral, Daily, Bhupendra Kumar MD, 50 mg at 01/01/24 1212    pantoprazole EC tablet 40 mg, 40 mg, Oral, Daily, Bhupendra Kumar MD, 40 mg at 01/01/24 1213    pregabalin capsule 50 mg, 50 mg, Oral, Daily, Bhupendra Kumar MD, 50 mg at 01/01/24 1715    promethazine tablet 25 mg, 25 mg, Oral, Q6H PRN, Bhupendra Kumar MD, 25 mg at 12/30/23 1739    sacubitriL-valsartan 24-26 mg per  tablet 1 tablet, 1 tablet, Oral, BID, PantBhupendra padilla MD, 1 tablet at 01/01/24 2027    sodium chloride 0.9% flush 10 mL, 10 mL, Intravenous, PRN, Levar Olsen MD    vancomycin (VANCOCIN) 4 mg, heparin (porcine) 5,000 Units IV catheter lock (total volume 2 mL), , Intra-Catheter, Daily, Yamilet Boyce MD    Facility-Administered Medications Ordered in Other Encounters:     0.9%  NaCl infusion (for blood administration), , Intravenous, Q24H PRN, Manav Schneider MD    0.9%  NaCl infusion (for blood administration), , Intravenous, Q24H PRN, Manav Schneider MD    benzocaine 20 % oral spray, , Mouth/Throat, QID PRN, Leslie Fishman MD    sodium chloride 0.9% flush 10 mL, 10 mL, Intravenous, PRN, Manav Schneider MD    albuterol-ipratropium, benzonatate, dextrose 50 % in water (D50W), glucagon (human recombinant), glucose, glucose, heparin (porcine), heparin (porcine), heparin (PORCINE), heparin (PORCINE), hydrALAZINE, insulin aspart U-100, melatonin, promethazine, sodium chloride 0.9%      Review of Systems   Constitutional: Negative for diaphoresis and fever.   Cardiovascular:  Negative for chest pain, dyspnea on exertion, leg swelling, near-syncope, orthopnea, palpitations, paroxysmal nocturnal dyspnea and syncope.   Respiratory:  Negative for cough and shortness of breath.    Gastrointestinal:  Negative for abdominal pain, diarrhea, nausea and vomiting.   Neurological:  Negative for light-headedness.   Psychiatric/Behavioral:  Negative for altered mental status and substance abuse.      Objective:     Vital Signs (Most Recent):  Temp: 98.5 °F (36.9 °C) (01/02/24 0730)  Pulse: 86 (01/02/24 0815)  Resp: 17 (01/02/24 0730)  BP: (!) 149/61 (01/02/24 0815)  SpO2: 97 % (01/02/24 0340) Vital Signs (24h Range):  Temp:  [97.8 °F (36.6 °C)-100 °F (37.8 °C)] 98.5 °F (36.9 °C)  Pulse:  [] 86  Resp:  [16-20] 17  SpO2:  [95 %-98 %] 97 %  BP: (121-160)/(59-83) 149/61     Weight: 78.5 kg (173 lb)  Body  mass index is 27.1 kg/m².     SpO2: 97 %     Telemetry: Normal sinus rhythm (90?), PVCs overnight.       Physical Exam  Constitutional:       Appearance: Normal appearance.   Cardiovascular:      Rate and Rhythm: Normal rate and regular rhythm.      Pulses: Normal pulses.      Heart sounds: Normal heart sounds.   Pulmonary:      Effort: Pulmonary effort is normal.      Breath sounds: Normal breath sounds. No wheezing or rales.   Abdominal:      General: Abdomen is flat. There is no distension.      Palpations: Abdomen is soft.      Tenderness: There is no abdominal tenderness.   Musculoskeletal:      Right lower leg: No edema.      Left lower leg: No edema.   Skin:     General: Skin is warm and dry.   Neurological:      Mental Status: She is alert and oriented to person, place, and time. Mental status is at baseline.   Psychiatric:         Mood and Affect: Mood normal.         Behavior: Behavior normal.            Significant Labs:     Recent Labs   Lab 12/31/23 0410 01/01/24 0311 01/02/24  0508   WBC 6.19 6.58 5.91   HGB 7.8* 7.6* 7.6*   HCT 24.7* 23.9* 22.6*   * 163 148*         Recent Labs   Lab 12/29/23  0004 12/30/23  0705 12/31/23 0410 01/01/24 0310 01/02/24  0508   *  135*   < > 137 136 133*   K 4.1  4.1   < > 4.0 4.7 5.0   CL 97  97   < > 99 99 101   CO2 26  26   < > 24 23 19*   BUN 21*  21*   < > 18 28* 34*   CREATININE 4.1*  4.1*   < > 4.4* 6.3* 7.2*   CALCIUM 7.6*  7.6*   < > 8.5* 8.4* 8.4*   PHOS 3.5  --   --   --   --     < > = values in this interval not displayed.         Recent Labs   Lab 12/31/23  0410 01/01/24  0310 01/02/24  0508   ALKPHOS 53* 59 57   BILITOT 0.3 0.3 0.3   PROT 5.9* 6.1 6.2   ALT 6* 9* 7*   AST 10 12 10         Significant Imaging:     JAVY 12/29/23    JAVY performed for infective endocarditis evaluation. Study aborted early due to clinical status.    Left Ventricle: The left ventricle is normal in size. Normal wall thickness. Normal wall motion.    Right  Ventricle: Normal right ventricular cavity size. Systolic function is normal. Pacemaker lead present in the ventricle.    Left Atrium: Left atrium is dilated. There is no thrombus in the left atrial cavity.    Right Atrium: Lead present in the right atrium. A large 0.7 x 2.6 cm mobile vegetation is attached to the ventricular lead within the right atrium. It does not appear to be attached to the tricuspid valve leaflets.     TTE 12/29/23    Left Ventricle: The left ventricle is normal in size. Increased ventricular mass. Increased wall thickness. There is concentric hypertrophy. Normal wall motion. There is normal systolic function with a visually estimated ejection fraction of 60 - 65%. Grade I diastolic dysfunction.    Right Ventricle: Normal right ventricular cavity size. Systolic function is normal. Pacemaker lead present in the ventricle with a 0.7 x 1.8 cm mobile, echogenic structure consistent with a vegetation.    Left Atrium: Left atrium is moderately dilated.    Right Atrium: Right atrium is mildly dilated.    Mitral Valve: The mitral valve is repaired by MitraClip. There is mild regurgitation.    Tricuspid Valve: The tricuspid valve is structurally normal. There is moderate regurgitation. The vegetation on the ventricular lead appears adjacent to but not adherent to the septal leaflet.    Pulmonary Artery: There is pulmonary hypertension. The estimated pulmonary artery systolic pressure is 76 mmHg.    IVC/SVC: Elevated venous pressure at 15 mmHg.    Pericardium: There is a trivial posterior effusion. No indication of cardiac tamponade.

## 2024-01-02 NOTE — PROGRESS NOTES
[facility] - Cardiology Stepdown  Infectious Disease  Progress Note    Patient Name: Ellen Roman  MRN: [id number]  Admission Date: 12/28/2023  Length of Stay: 5 days  Attending Physician: Yamilet Boyce MD  Primary Care Provider: Paco Amanda MD    Isolation Status: Contact  Assessment/Plan:      ID  * MRSA bacteremia  Secondary to AICD infection.   Management as in AICD infection.  Pending EP management.     Infection associated with cardiac device  CIED (pacemaker lead vegetation on JAVY) from persistent MRSA bacteremia on salvage therapy, bacteremia present for > two weeks, cx persistently positive from 12/18 onwards. Transferred from OSH to [facility] for evaluation for extraction.  Blood cultures on 12/8 with C perfringes and MRSA. Repeat blood cultures  from admission 12/18-30 with MRSA. Patient is afebrile, without leukocytosis and stable hemodynamics. Most recent Bcx 01/01/24 incubating.      Recommendations  Continue daptomycin and Ceftaroline (switch dosing to TID, discussed with ID pharmacy team)  Follow up repeat blood cultures, check CPK weekly  Follow up EP recommendations, anticipating extraction this week.   At least six weeks of therapy from source control and microbiological clearance. Removal of femoral line will help if she does not clear bacteremia post lead extraction           Anticipated Disposition: TBD    Thank you for your consult. I will follow-up with patient. Please contact us if you have any additional questions.    Bishop Powell MD  Infectious Disease  [facility] - Cardiology Step[facility]    Subjective:     Principal Problem:MRSA bacteremia    HPI: A 58-year-old woman with DM1, diabetic gastroparesis, diabetic retinopathy, diabetic nephropathy, ESRD on HD (MWF), CAD, HTN, s/p AICD, s/p mitral clip due to severe MR, chronic thrombocytopenia, and recent C tropicalis fungemia with positive HD catheter for K pneumoniae (October) who was transferred from [facility] for  "further management in the setting of refractory MRSA bacteremia and ACID lead vegetation.     Of note, Mrs. Roman was seen in [facility] ED on 12/18 at which time samples were collected for culture and she was subsequently discharged Blood cultures collected on 12/8 became positive for MRSA and C perfringens. She returned to [facility] on 12/18 at which time she was admitted for further management. Cultures collected from 12/18-12/27 are positive for MRSA. During her admission at [facility] she was diagnosed and treated for left IJ DVT, LUE cellulitis, DM1. Work up also revealed evidence of a large vegetation attached to the AICD lead across the TV.     Infectious Diseases consulted for "Bacteremia with concern for ICD lead infection. Transferred on daptomycin and cefepime. "    Interval History: TAYLOR    Review of Systems   Constitutional:  Negative for fever.   HENT:  Negative for trouble swallowing.    Respiratory:  Negative for cough.    Cardiovascular:  Negative for chest pain.   Gastrointestinal:  Negative for abdominal pain, diarrhea and vomiting.   Musculoskeletal:  Negative for arthralgias and back pain.   Psychiatric/Behavioral:  Negative for confusion.      Objective:     Vital Signs (Most Recent):  Temp: 98.6 °F (37 °C) (01/02/24 1117)  Pulse: 86 (01/02/24 1117)  Resp: 18 (01/02/24 1117)  BP: 133/64 (01/02/24 1117)  SpO2: 97 % (01/02/24 0340) Vital Signs (24h Range):  Temp:  [97.8 °F (36.6 °C)-99.5 °F (37.5 °C)] 98.6 °F (37 °C)  Pulse:  [] 86  Resp:  [16-20] 18  SpO2:  [96 %-97 %] 97 %  BP: (108-160)/(56-83) 133/64     Weight: 78.5 kg (173 lb)  Body mass index is 27.1 kg/m².    Estimated Creatinine Clearance: 9.3 mL/min (A) (based on SCr of 7.2 mg/dL (H)).     Physical Exam  Constitutional:       Appearance: Normal appearance. She is not ill-appearing or toxic-appearing.   HENT:      Head: Normocephalic and atraumatic.      Nose: Nose normal.      Mouth/Throat:      Mouth: Mucous membranes are moist.      Pharynx: " Oropharynx is clear.   Eyes:      Conjunctiva/sclera: Conjunctivae normal.   Cardiovascular:      Rate and Rhythm: Normal rate and regular rhythm.      Pulses: Normal pulses.      Heart sounds: Normal heart sounds.   Pulmonary:      Effort: Pulmonary effort is normal.      Breath sounds: Normal breath sounds.   Abdominal:      General: Bowel sounds are normal.      Palpations: Abdomen is soft.   Musculoskeletal:         General: No swelling or deformity.      Cervical back: Neck supple.   Skin:     General: Skin is warm and dry.      Comments: R femoral HD line site c/d/i   Neurological:      Mental Status: She is alert and oriented to person, place, and time. Mental status is at baseline.   Psychiatric:         Behavior: Behavior normal.         Thought Content: Thought content normal.          Significant Labs:   Microbiology Results (last 7 days)       Procedure Component Value Units Date/Time    Blood culture [[id number]] Collected: 01/01/24 0856    Order Status: Completed Specimen: Blood from Antecubital, Right Updated: 01/02/24 1212     Blood Culture, Routine No Growth to date      No Growth to date    Blood culture [[id number]] Collected: 01/01/24 0856    Order Status: Completed Specimen: Blood Updated: 01/02/24 1212     Blood Culture, Routine No Growth to date      No Growth to date    Blood culture [[id number]]  (Abnormal) Collected: 12/30/23 1739    Order Status: Completed Specimen: Blood Updated: 01/02/24 0716     Blood Culture, Routine Gram stain marcella bottle: Gram positive cocci in clusters resembling Staph      Positive results previously called 12/31/2023  22:59      Gram stain aer bottle: Gram positive cocci in clusters resembling Staph      METHICILLIN RESISTANT STAPHYLOCOCCUS AUREUS  ID consult required at [facility] [facility].  For susceptibility see order #[id number]      Blood culture [[id number]]  (Abnormal) Collected: 12/30/23 1741    Order Status: Completed Specimen: Blood  Updated: 01/02/24 0714     Blood Culture, Routine Gram stain aer bottle: Gram positive cocci in clusters resembling Staph      Positive results previously called 01/01/2024  01:12      Gram stain marcella bottle: Gram positive cocci in clusters resembling Staph      Positive results previously called 01/01/2024  09:19      METHICILLIN RESISTANT STAPHYLOCOCCUS AUREUS  ID consult required at [facility].  For susceptibility see order #[id number]      Blood culture [[id number]]  (Abnormal) Collected: 12/29/23 0004    Order Status: Completed Specimen: Blood Updated: 01/01/24 0831     Blood Culture, Routine Gram stain aer bottle: Gram positive cocci in clusters resembling Staph      Positive results previously called 12/30/2023      METHICILLIN RESISTANT STAPHYLOCOCCUS AUREUS  ID consult required at [facility].  For susceptibility see order # [id number]      Blood culture [[id number]]  (Abnormal)  (Susceptibility) Collected: 12/29/23 0004    Order Status: Completed Specimen: Blood Updated: 01/01/24 0831     Blood Culture, Routine Gram stain aer bottle: Gram positive cocci in clusters resembling Staph      Results called to and read back by: Mary Lopez RN 12/30/2023  09:26      METHICILLIN RESISTANT STAPHYLOCOCCUS AUREUS  ID consult required at [facility].      MRSA/SA Rapid ID by PCR from Blood culture [[id number]]  (Abnormal) Collected: 12/29/23 0004    Order Status: Completed Updated: 12/30/23 1037     Staph aureus ID by PCR Positive     Methicillin Resistant ID by PCR Positive          All pertinent labs within the past 24 hours have been reviewed.  Recent Lab Results         01/02/24  0806   01/02/24  0508   01/01/24 2025 01/01/24  1502        Albumin   2.2           ALP   57           ALT   7           Anion Gap   13           aPTT   39.3  Comment: Refer to local heparin nomogram for intensity/dose specific   therapeutic    range.             AST   10           Baso #   0.02           Basophil %   0.3           BILIRUBIN TOTAL   0.3  Comment: For infants and newborns, interpretation of results should be based  on gestational age, weight and in agreement with clinical  observations.    Premature Infant recommended reference ranges:  Up to 24 hours.............<8.0 mg/dL  Up to 48 hours............<12.0 mg/dL  3-5 days..................<15.0 mg/dL  6-29 days.................<15.0 mg/dL             BUN   34           Calcium   8.4           Chloride   101           CO2   19           Creatinine   7.2           Differential Method   Automated           eGFR   6.1           Eos #   0.2           Eosinophil %   3.9           Glucose   167           Gran # (ANC)   4.2           Gran %   71.7           Hematocrit   22.6           Hemoglobin   7.6           Immature Grans (Abs)   0.09  Comment: Mild elevation in immature granulocytes is non specific and   can be seen in a variety of conditions including stress response,   acute inflammation, trauma and pregnancy. Correlation with other   laboratory and clinical findings is essential.             Immature Granulocytes   1.5           Lymph #   0.9           Lymph %   15.2           Magnesium    1.9           MCH   30.2           MCHC   33.6           MCV   90           Mono #   0.4           Mono %   7.4           MPV   11.3           nRBC   0           Platelet Count   148           POCT Glucose 192     177   206       Potassium   5.0           PROTEIN TOTAL   6.2           RBC   2.52           RDW   16.9           Sodium   133           WBC   5.91                   Significant Imaging: I have reviewed all pertinent imaging results/findings within the past 24 hours.

## 2024-01-02 NOTE — SUBJECTIVE & OBJECTIVE
Interval History: Patient seen this morning, on HD. No overnight events.     Review of patient's allergies indicates:  No Known Allergies  Current Facility-Administered Medications   Medication Frequency    0.9%  NaCl infusion Once    albuterol sulfate nebulizer solution 2.5 mg Q4H    albuterol-ipratropium 2.5 mg-0.5 mg/3 mL nebulizer solution 3 mL Q4H PRN    aspirin chewable tablet 81 mg Daily    atorvastatin tablet 40 mg Daily    benzonatate capsule 200 mg Q8H PRN    calcium acetate(phosphat bind) capsule 667 mg TID WM    ceftaroline fosamiL (TEFLARO) 200 mg in dextrose 5 % (D5W) 50 mL IVPB Q12H    DAPTOmycin (CUBICIN) 785 mg in sodium chloride 0.9% SolP 50 mL IVPB Q48H    dextrose 50 % in water (D50W) injection 50 mL PRN    docusate sodium capsule 50 mg Daily    epoetin john injection 7,900 Units Every Tues, Thurs, Sat    furosemide tablet 80 mg Daily    glucagon (human recombinant) injection 1 mg PRN    glucose chewable tablet 16 g PRN    glucose chewable tablet 24 g PRN    heparin (porcine) injection 1,000 Units PRN    heparin (porcine) injection 3,000 Units Daily PRN    heparin 25,000 units in dextrose 5% (100 units/ml) IV bolus from bag - ADDITIONAL PRN BOLUS - 30 units/kg PRN    heparin 25,000 units in dextrose 5% (100 units/ml) IV bolus from bag - ADDITIONAL PRN BOLUS - 60 units/kg PRN    heparin 25,000 units in dextrose 5% 250 mL (100 units/mL) infusion HIGH INTENSITY nomogram - [facility] Continuous    hydrALAZINE injection 10 mg Q8H PRN    hydrALAZINE tablet 25 mg Q8H    insulin aspart U-100 pen 0-5 Units AC + HS + 0200 PRN    insulin aspart U-100 pen 6 Units TIDWM    insulin detemir U-100 (Levemir) pen 16 Units BID    melatonin tablet 3 mg Nightly PRN    metoprolol succinate (TOPROL-XL) 24 hr tablet 50 mg Daily    pantoprazole EC tablet 40 mg Daily    pregabalin capsule 50 mg Daily    promethazine tablet 25 mg Q6H PRN    sacubitriL-valsartan 24-26 mg per tablet 1 tablet BID    sodium chloride 0.9% flush 10 mL  PRN    vancomycin (VANCOCIN) 4 mg, heparin (porcine) 5,000 Units IV catheter lock (total volume 2 mL) Daily     Facility-Administered Medications Ordered in Other Encounters   Medication Frequency    0.9%  NaCl infusion (for blood administration) Q24H PRN    0.9%  NaCl infusion (for blood administration) Q24H PRN    benzocaine 20 % oral spray QID PRN    sodium chloride 0.9% flush 10 mL PRN       Objective:     Vital Signs (Most Recent):  Temp: 98.5 °F (36.9 °C) (01/02/24 0730)  Pulse: 86 (01/02/24 0830)  Resp: 17 (01/02/24 0730)  BP: 133/63 (01/02/24 0830)  SpO2: 97 % (01/02/24 0340) Vital Signs (24h Range):  Temp:  [97.8 °F (36.6 °C)-100 °F (37.8 °C)] 98.5 °F (36.9 °C)  Pulse:  [] 86  Resp:  [16-20] 17  SpO2:  [95 %-98 %] 97 %  BP: (121-160)/(59-83) 133/63     Weight: 78.5 kg (173 lb) (12/29/23 1345)  Body mass index is 27.1 kg/m².  Body surface area is 1.93 meters squared.    I/O last 3 completed shifts:  In: 342 [P.O.:342]  Out: -      Physical Exam  Constitutional:       General: She is not in acute distress.     Appearance: Normal appearance. She is not ill-appearing or toxic-appearing.   HENT:      Head: Atraumatic.      Right Ear: External ear normal.      Left Ear: External ear normal.      Nose: Nose normal.      Mouth/Throat:      Mouth: Mucous membranes are moist.   Eyes:      Extraocular Movements: Extraocular movements intact.   Cardiovascular:      Rate and Rhythm: Normal rate and regular rhythm.      Pulses: Normal pulses.      Heart sounds: Normal heart sounds.   Pulmonary:      Effort: Pulmonary effort is normal.      Breath sounds: Normal breath sounds.   Abdominal:      General: Abdomen is flat.      Palpations: Abdomen is soft.   Musculoskeletal:         General: Normal range of motion.      Right lower leg: No edema.      Left lower leg: No edema.   Skin:     General: Skin is warm.      Capillary Refill: Capillary refill takes less than 2 seconds.   Neurological:      Mental Status: She is  alert.          Significant Labs:  CBC:   Recent Labs   Lab 01/02/24  0508   WBC 5.91   RBC 2.52*   HGB 7.6*   HCT 22.6*   *   MCV 90   MCH 30.2   MCHC 33.6     CMP:   Recent Labs   Lab 01/02/24  0508   *   CALCIUM 8.4*   ALBUMIN 2.2*   PROT 6.2   *   K 5.0   CO2 19*      BUN 34*   CREATININE 7.2*   ALKPHOS 57   ALT 7*   AST 10   BILITOT 0.3     All labs within the past 24 hours have been reviewed.     Significant Imaging:

## 2024-01-02 NOTE — NURSING
Dialysis tx started R. Fem trialysis cath per orders  placed by MADISYN Abdi:    Order Questions    Question Answer   Antibiotics on HD? No   Duration of Treatment 3.5 hours   Dialyzer [id number]   Dialysate Temperature (C) 36.5    mL/min    mL/min   K+ Potassium per Protocol   Ca++ Calcium per Protocol   Na+ Sodium per Protocol   Bicarb Bicarbonate per Protocol   Access Other (please specify)   Fluid Removal (L) 2.5L   Fluid Removal Instructions maintain SBP > 90 mmHG   Dialysate Bath Solution Protocol (DO NOT MODIFY ANSWER) [email or web address] Dialysate Bath Solution Algorithm (formatted with date).pdf

## 2024-01-02 NOTE — PLAN OF CARE
Problem: Infection  Goal: Absence of Infection Signs and Symptoms  Outcome: Ongoing, Progressing     Problem: Device-Related Complication Risk (Hemodialysis)  Goal: Safe, Effective Therapy Delivery  1/2/2024 1242 by Diana Jay RN  Outcome: Ongoing, Progressing  1/2/2024 1138 by Diana Jay RN  Outcome: Ongoing, Progressing     Problem: Hemodynamic Instability (Hemodialysis)  Goal: Effective Tissue Perfusion  1/2/2024 1242 by Diana Jay RN  Outcome: Ongoing, Progressing  1/2/2024 1138 by Diana Jay RN  Outcome: Ongoing, Progressing     Problem: Infection (Hemodialysis)  Goal: Absence of Infection Signs and Symptoms  Outcome: Ongoing, Progressing      Dialysis tx ended to the right fem trialysis cath, Vancomycin locked and capped.    Net fluid removed: 3L    Report given to nurse Paco pt transported by wheelchair from [facility] to room 322.    Continuous IV heparin infusion unchanged

## 2024-01-03 PROBLEM — K59.01 SLOW TRANSIT CONSTIPATION: Status: ACTIVE | Noted: 2024-01-01

## 2024-01-03 PROBLEM — I38 ENDOCARDITIS: Status: ACTIVE | Noted: 2024-01-01

## 2024-01-03 NOTE — PROGRESS NOTES
[facility] - Cardiology [facility] Medicine  Progress Note    Patient Name: Ellen Roman  MRN: [id number]  Patient Class: IP- Inpatient   Admission Date: 12/28/2023  Length of Stay: 6 days  Attending Physician: Yamilet Boyce MD  Primary Care Provider: Paco Amanda MD        Subjective:     Principal Problem:MRSA bacteremia        HPI:  Ellen Roman is 58 year old female with PMHx significant for T1DM c/b gastroparesis, retinopathy and nephropathy - ESRD on HD, HTN, HLD, CAD s/p prior PCI with ischemic cardiomyopathy s/p AICD placement, chronic thrombocytopenia, severe mitral regurgitation status post MitraClip, history of fungemia/bacteremia initially presenting to [facility] ER on 12/18 with a complaints of right-sided chest pain associated with nausea and vomiting multiple episodes for the prior 2 days.     She also reported low-grade temperature. Patient reports generalized weakness. Cardiology performed LHC on 12/20/2023 for evaluation of this chest pain with recommendation for medical management for coronary artery disease. Course is then complicated by refractory MRSA bacteremia. HM team initially suspected source of bacteremia was a tunneled dialysis catheter. This was removed upon admission and patient had placement of left IJ. The placement of left IJ trialysis catheter was c/b IJ thrombosis for which she is started on provoked DVT management - currently with lovenox though with ESRD would consider transition to heparin. IR had difficulty placing the right IJ due to thrombosis as well and access subsequently was placed on the right femoral. Due to persistent bacteremia despite vancomycin, patient subsequently had JAVY 12/27/23 with findings reported as positive for vegetations present on the ICD lead wire. As per OS cardiologist, requested transfer from [facility] to [facility]. 2018 CRT-D [employer].     Current medications:  aspirin 81 mg Oral Daily   atorvastatin 40 mg Oral Nightly   cefepime 1 g  Intravenous Q24H   DAPTOmycin 6 mg/kg Intravenous Q48H since 12/26  enoxaparin 1 mg/kg (Ideal) Subcutaneous Nightly   epoetin john 100 Units/kg Subcutaneous Once per day on Tuesday Thursday Saturday   hydrALAZINE 25 mg Oral TID   insulin glargine 10 Units Subcutaneous Nightly   insulin lispro 0-10 Units Subcutaneous AC & HS   ipratropium-albuteroL 3 mL Nebulization Q6H   metoprolol succinate XL 50 mg Oral Daily   pantoprazole 40 mg Oral QAM   pregabalin 50 mg Oral Daily since 12/27  rifAMPin 300 mg Oral Daily   sacubitriL-valsartan 1 tablet Oral BID   vancomycin 15 mg/kg Intravenous since 12/18    Overview/Hospital Course:  ID, Nephro, EP consulted on admission. JAVY performed 12/29. TTE 12/29:       Left Ventricle: The left ventricle is normal in size. Increased ventricular mass. Increased wall thickness. There is concentric hypertrophy. Normal wall motion. There is normal systolic function with a visually estimated ejection fraction of 60 - 65%. Grade I diastolic dysfunction.    Right Ventricle: Normal right ventricular cavity size. Systolic function is normal. Pacemaker lead present in the ventricle with a 0.7 x 1.8 cm mobile, echogenic structure consistent with a vegetation.    Left Atrium: Left atrium is moderately dilated.    Right Atrium: Right atrium is mildly dilated.    Mitral Valve: The mitral valve is repaired by MitraClip. There is mild regurgitation.    Tricuspid Valve: The tricuspid valve is structurally normal. There is moderate regurgitation. The vegetation on the ventricular lead appears adjacent to but not adherent to the septal leaflet.    Pulmonary Artery: There is pulmonary hypertension. The estimated pulmonary artery systolic pressure is 76 mmHg.    IVC/SVC: Elevated venous pressure at 15 mmHg.    Pericardium: There is a trivial posterior effusion. No indication of cardiac tamponade.    ID changed antibiotics to cefepime and dapto. Endocrine consulted for diabetes management. Nephro consulted  for HD needs. Lasix increased to 80mg qday. Patient was continued on heparin gtt for thrombosed IJ. JAVY w/RV pacer lead w/0.7 x 1.8 cm mobile, echogenic structure consistent with a vegetation. Did a vanc lock for the Rt fem CVL 12/30. Repeat blood cultures obtained 12/30 due to Bcx 12/29 +iv for MRSA. Had issues with hyperglycemia- insulin regimen modified. Patient with several bouts of vomiting PM of 01/02 and am of 01/03- KUB w/large stool burden. Up-titrated bowel regimen.     Interval History: Nausea and several bouts of vomiting last night and this am. KUB w/large amount of stool burden and large amount of stool in the rectal vault. No sob, chest pain, fevers, chills, night sweats, abd pain, diarrhea, constipation.    Objective:     Vital Signs (Most Recent):  Temp: 97.3 °F (36.3 °C) (01/03/24 1145)  Pulse: 99 (01/03/24 1509)  Resp: 17 (01/03/24 1145)  BP: 125/69 (01/03/24 1145)  SpO2: (!) 94 % (01/03/24 1145) Vital Signs (24h Range):  Temp:  [97.3 °F (36.3 °C)-100.3 °F (37.9 °C)] 97.3 °F (36.3 °C)  Pulse:  [] 99  Resp:  [16-20] 17  SpO2:  [91 %-100 %] 94 %  BP: (123-179)/(60-78) 125/69     Weight: 78.5 kg (173 lb)  Body mass index is 27.1 kg/m².    Intake/Output Summary (Last 24 hours) at 1/3/2024 1600  Last data filed at 1/3/2024 1448  Gross per 24 hour   Intake 120 ml   Output --   Net 120 ml           Physical Exam  Gen: in NAD, appears stated age, appears acutely ill   Neuro: AAOx3, motor, sensory, and strength grossly intact BL  HEENT: NTNC, EOMI, PERRL, MMM, nodular presence of the Rt IJ area  CVS: RRR, no m/r/g; S1/S2 auscultated with no S3 or S4; capillary refill < 2 sec  Resp: lungs CTAB, no w/r/r; no belabored breathing or accessory muscle use appreciated   Abd: BS+ in all 4 quadrants; NTND, soft to palpation; no organomegaly appreciated   Extrem: pulses full, equal, and regular over all 4 extremities; no UE edema, BL LE edema, Rt fem CVL    Significant Labs: All pertinent labs within the past  "24 hours have been reviewed.  Blood Culture:   No results for input(s): "LABBLOO" in the last 48 hours.    CBC:   Recent Labs   Lab 01/02/24  0508 01/03/24  0857   WBC 5.91 10.31   HGB 7.6* 8.2*   HCT 22.6* 25.0*   * 159     CMP:   Recent Labs   Lab 01/02/24  0508 01/03/24  0857   * 134*   K 5.0 4.5    97   CO2 19* 24   * 289*   BUN 34* 20   CREATININE 7.2* 6.1*   CALCIUM 8.4* 8.7   PROT 6.2 6.7   ALBUMIN 2.2* 2.4*   BILITOT 0.3 0.4   ALKPHOS 57 59   AST 10 11   ALT 7* 9*   ANIONGAP 13 13     Respiratory Culture: No results for input(s): "GSRESP", "RESPIRATORYC" in the last 48 hours.  Troponin: No results for input(s): "TROPONINI", "TROPONINIHS" in the last 48 hours.    Significant Imaging: I have reviewed all pertinent imaging results/findings within the past 24 hours.    KUB:  FINDINGS:  Cardiomegaly, status post left subclavian pacing defibrillator device therapy, elevation right hemidiaphragm, mitral valve metal density stable.  Insertion right transfemoral dual lumen catheter, tip IVC lumen at L3-L4 disc level.  Nondistended air GI track.  Large amount of stool large bowel, including rectum, the possibility of pelvic floor relaxation question.  Amorphous 2-3 mm size calcific density central left pelvis suspicious for calcified fibroids.  Evidence of splenomegaly 16.5 cm vertical height.     Impression:     Constipation.     Incidental splenomegaly.  Additional findings above.     This report was flagged in Epic as abnormal    JAVY:     Left Ventricle: The left ventricle is normal in size. Increased ventricular mass. Increased wall thickness. There is concentric hypertrophy. Normal wall motion. There is normal systolic function with a visually estimated ejection fraction of 60 - 65%. Grade I diastolic dysfunction.    Right Ventricle: Normal right ventricular cavity size. Systolic function is normal. Pacemaker lead present in the ventricle with a 0.7 x 1.8 cm mobile, echogenic structure " consistent with a vegetation.    Left Atrium: Left atrium is moderately dilated.    Right Atrium: Right atrium is mildly dilated.    Mitral Valve: The mitral valve is repaired by MitraClip. There is mild regurgitation.    Tricuspid Valve: The tricuspid valve is structurally normal. There is moderate regurgitation. The vegetation on the ventricular lead appears adjacent to but not adherent to the septal leaflet.    Pulmonary Artery: There is pulmonary hypertension. The estimated pulmonary artery systolic pressure is 76 mmHg.    IVC/SVC: Elevated venous pressure at 15 mmHg.    Pericardium: There is a trivial posterior effusion. No indication of cardiac tamponade.    Assessment/Plan:      * MRSA bacteremia  - MRSA on OSH Bcx since 12/18. On daptomycin 6mg/kg Q48hr and cefepime at OSH  - Trialysis line removed due to concern for source  - 12/27 JAVY concern for vegetation reported  - ID consulted- continue dapto 10mg/kg and ceftaroline  - Bcx 12/29- 2/4 +iv MRSA  - Bcx 12/30- 2/4 +iv MRSA  - Bcx 01/01- NGTD  - continue vanc antibiotic locks  - EP consulted- JAVY performed w/RV lead vegetation- will need to undergo extraction and then possible re-implantation of leadless pacer after clearance of blood cultures -CTS consulted as well-- possible extraction tomorrow, 01/04- npo at midnight     Slow transit constipation  - h/o gastroparesis per SO at bedside  - persistent nausea and vomiting overnight and this am  - KUB w/large stool burden  - Begin miralax TID and senna qday  - Continue with colace  - rectal supp x1  - monitor stool output  - prn zofran and compazine for n/v      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  Last A1c reviewed-   Lab Results   Component Value Date    HGBA1C 8.0 (H) 12/19/2023     Most recent fingerstick glucose reviewed-   Recent Labs   Lab 01/02/24 2002 01/03/24  0810 01/03/24  1013 01/03/24  1304   POCTGLUCOSE  159* 289* 312* 267*       Current correctional scale  Medium  Maintain anti-hyperglycemic dose as follows-   Antihyperglycemics (From admission, onward)      Start     Stop Route Frequency Ordered    01/01/24 2100  insulin detemir U-100 (Levemir) pen 16 Units         -- SubQ 2 times daily 01/01/24 1223    12/31/23 1200  insulin aspart U-100 pen 0-5 Units         -- SubQ Before meals, nightly and at 0200 PRN 12/31/23 1151    12/31/23 0745  insulin aspart U-100 pen 6 Units         -- SubQ 3 times daily with meals 12/31/23 0744          Hold Oral hypoglycemics while patient is in the hospital.    Infection associated with cardiac device  - see MRSA bacteremia       Chronic kidney disease-mineral and bone disorder  Creatine stable for now. BMP reviewed- noted Estimated Creatinine Clearance: 11 mL/min (A) (based on SCr of 6.1 mg/dL (H)). according to latest data. Based on current GFR, CKD stage is end stage.  Monitor UOP and serial BMP and adjust therapy as needed. Renally dose meds. Avoid nephrotoxic medications and procedures.    - continue calcium acetate    Anemia due to stage 5 chronic kidney disease, not on chronic dialysis  Patient's anemia is currently uncontrolled. Has not received any PRBCs to date. Etiology likely d/t chronic disease due to Chronic Kidney Disease/ESRD  Current CBC reviewed-   Lab Results   Component Value Date    HGB 8.2 (L) 01/03/2024    HCT 25.0 (L) 01/03/2024     Monitor serial CBC and transfuse if patient becomes hemodynamically unstable, symptomatic or H/H drops below 7/21.  - continue epo    ESRD (end stage renal disease)  - ESRD with trialysis line removed.   - Nephrology following- Rt fem CVL- will need clearance of blood cultures and ICD removal first prior to new TDC    Cardiac resynchronization therapy defibrillator (CRT-D) in place  - [employer] CRT-D implanted 2018      Nonrheumatic mitral valve regurgitation  - S/p mitraclip       Coronary artery disease involving native  coronary artery of native heart without angina pectoris  - S/P PCI D2 with SHU x 1 (07/13/2016)  - S/P PCI OM1 with SHU x 1 (01/04/2017)  - S/P PCI prox D2 with SHU x 1 (03/07/2017)  - Patent stents with [facility] by [facility] 12/10/2019  - S/P LHC with RCA 90 % stenosis, SHU x 3 (4/5/2022)    Most recent C during admission. Continue aspirin, heparin drip.      VTE Risk Mitigation (From admission, onward)           Ordered     heparin 25,000 units in dextrose 5% (100 units/ml) IV bolus from bag - ADDITIONAL PRN BOLUS - 60 units/kg  As needed (PRN)        Question:  Heparin Infusion Adjustment (DO NOT MODIFY ANSWER)  Answer:  [email or web address]\Images\Pharmacy\HeparinInfusions\heparin HIGH INTENSITY nomogram for [facility] [id number]    01/03/24 0843     heparin 25,000 units in dextrose 5% (100 units/ml) IV bolus from bag - ADDITIONAL PRN BOLUS - 30 units/kg  As needed (PRN)        Question:  Heparin Infusion Adjustment (DO NOT MODIFY ANSWER)  Answer:  [email or web address]\Images\Pharmacy\HeparinInfusions\heparin HIGH INTENSITY nomogram for [facility] [id number]    01/03/24 0843     heparin 25,000 units in dextrose 5% (100 units/ml) IV bolus from bag INITIAL BOLUS  Once        Question:  Heparin Infusion Adjustment (DO NOT MODIFY ANSWER)  Answer:  [email or web address]\Images\Pharmacy\HeparinInfusions\heparin HIGH INTENSITY nomogram for [facility] [id number]    01/03/24 0843     heparin 25,000 units in dextrose 5% 250 mL (100 units/mL) infusion HIGH INTENSITY nomogram - [facility]  Continuous        Question:  Begin at (units/kg/hr)  Answer:  18    01/03/24 0843     vancomycin (VANCOCIN) 4 mg, heparin (porcine) 5,000 Units IV catheter lock (total volume 2 mL)  Daily         01/01/24 0828     heparin (porcine) injection 1,000 Units  As needed (PRN)         12/30/23 1102     heparin (porcine) injection 3,000 Units  Daily PRN         12/28/23 2331                    Discharge Planning   VIRA: 1/8/2024     Code Status: Full Code   Is the patient medically ready  for discharge?: No    Reason for patient still in hospital (select all that apply): Patient trending condition  Discharge Plan A: Home with family, Home Health                  Yamilet Boyce MD  Department of Hospital Medicine   [facility] - Cardiology Stepdown

## 2024-01-03 NOTE — ASSESSMENT & PLAN NOTE
- h/o gastroparesis per SO at bedside  - persistent nausea and vomiting overnight and this am  - KUB w/large stool burden  - Begin miralax TID and senna qday  - Continue with colace  - rectal supp x1  - monitor stool output  - prn zofran and compazine for n/v

## 2024-01-03 NOTE — PROGRESS NOTES
[facility] - Cardiology [facility] Medicine  Progress Note    Patient Name: Ellen Roman  MRN: [id number]  Patient Class: IP- Inpatient   Admission Date: 12/28/2023  Length of Stay: 5 days  Attending Physician: Yamilet Boyce MD  Primary Care Provider: Paco Amanda MD        Subjective:     Principal Problem:MRSA bacteremia        HPI:  Ellen Roman is 58 year old female with PMHx significant for T1DM c/b gastroparesis, retinopathy and nephropathy - ESRD on HD, HTN, HLD, CAD s/p prior PCI with ischemic cardiomyopathy s/p AICD placement, chronic thrombocytopenia, severe mitral regurgitation status post MitraClip, history of fungemia/bacteremia initially presenting to [facility] ER on 12/18 with a complaints of right-sided chest pain associated with nausea and vomiting multiple episodes for the prior 2 days.     She also reported low-grade temperature. Patient reports generalized weakness. Cardiology performed LHC on 12/20/2023 for evaluation of this chest pain with recommendation for medical management for coronary artery disease. Course is then complicated by refractory MRSA bacteremia. HM team initially suspected source of bacteremia was a tunneled dialysis catheter. This was removed upon admission and patient had placement of left IJ. The placement of left IJ trialysis catheter was c/b IJ thrombosis for which she is started on provoked DVT management - currently with lovenox though with ESRD would consider transition to heparin. IR had difficulty placing the right IJ due to thrombosis as well and access subsequently was placed on the right femoral. Due to persistent bacteremia despite vancomycin, patient subsequently had JAVY 12/27/23 with findings reported as positive for vegetations present on the ICD lead wire. As per OS cardiologist, requested transfer from [facility] to [facility] [place]. 2018 CRT-D [employer].     Current medications:  aspirin 81 mg Oral Daily   atorvastatin 40 mg Oral Nightly   cefepime 1 g  Intravenous Q24H   DAPTOmycin 6 mg/kg Intravenous Q48H since 12/26  enoxaparin 1 mg/kg (Ideal) Subcutaneous Nightly   epoetin john 100 Units/kg Subcutaneous Once per day on Tuesday Thursday Saturday   hydrALAZINE 25 mg Oral TID   insulin glargine 10 Units Subcutaneous Nightly   insulin lispro 0-10 Units Subcutaneous AC & HS   ipratropium-albuteroL 3 mL Nebulization Q6H   metoprolol succinate XL 50 mg Oral Daily   pantoprazole 40 mg Oral QAM   pregabalin 50 mg Oral Daily since 12/27  rifAMPin 300 mg Oral Daily   sacubitriL-valsartan 1 tablet Oral BID   vancomycin 15 mg/kg Intravenous since 12/18    Overview/Hospital Course:  ID, Nephro, EP consulted on admission. JAVY performed 12/29. TTE 12/29:       Left Ventricle: The left ventricle is normal in size. Increased ventricular mass. Increased wall thickness. There is concentric hypertrophy. Normal wall motion. There is normal systolic function with a visually estimated ejection fraction of 60 - 65%. Grade I diastolic dysfunction.    Right Ventricle: Normal right ventricular cavity size. Systolic function is normal. Pacemaker lead present in the ventricle with a 0.7 x 1.8 cm mobile, echogenic structure consistent with a vegetation.    Left Atrium: Left atrium is moderately dilated.    Right Atrium: Right atrium is mildly dilated.    Mitral Valve: The mitral valve is repaired by MitraClip. There is mild regurgitation.    Tricuspid Valve: The tricuspid valve is structurally normal. There is moderate regurgitation. The vegetation on the ventricular lead appears adjacent to but not adherent to the septal leaflet.    Pulmonary Artery: There is pulmonary hypertension. The estimated pulmonary artery systolic pressure is 76 mmHg.    IVC/SVC: Elevated venous pressure at 15 mmHg.    Pericardium: There is a trivial posterior effusion. No indication of cardiac tamponade.    ID changed antibiotics to cefepime and dapto. Endocrine consulted for diabetes management. Nephro consulted  for HD needs. Lasix increased to 80mg qday. Patient was continued on heparin gtt for thrombosed IJ. JAVY w/RV pacer lead w/0.7 x 1.8 cm mobile, echogenic structure consistent with a vegetation. Did a vanc lock for the Rt fem CVL 12/30. Repeat blood cultures obtained 12/30 due to Bcx 12/29 +iv for MRSA. Had issues with hyperglycemia- insulin regimen modified.     Interval History: No complaints this am. No sob, chest pain, n/v, fevers, chills, night sweats, abd pain, diarrhea, constipation.    Objective:     Vital Signs (Most Recent):  Temp: 98.2 °F (36.8 °C) (01/02/24 1605)  Pulse: 96 (01/02/24 1800)  Resp: 17 (01/02/24 1639)  BP: (!) 152/71 (01/02/24 1605)  SpO2: 100 % (01/02/24 1639) Vital Signs (24h Range):  Temp:  [97.8 °F (36.6 °C)-99 °F (37.2 °C)] 98.2 °F (36.8 °C)  Pulse:  [] 96  Resp:  [16-19] 17  SpO2:  [94 %-100 %] 100 %  BP: (108-160)/(56-83) 152/71     Weight: 78.5 kg (173 lb)  Body mass index is 27.1 kg/m².    Intake/Output Summary (Last 24 hours) at 1/2/2024 1928  Last data filed at 1/2/2024 1541  Gross per 24 hour   Intake 900 ml   Output 3800 ml   Net -2900 ml           Physical Exam  Gen: in NAD, appears stated age  Neuro: AAOx3, motor, sensory, and strength grossly intact BL  HEENT: NTNC, EOMI, PERRL, MMM, nodular presence of the Rt IJ area  CVS: RRR, no m/r/g; S1/S2 auscultated with no S3 or S4; capillary refill < 2 sec  Resp: lungs CTAB, no w/r/r; no belabored breathing or accessory muscle use appreciated   Abd: BS+ in all 4 quadrants; NTND, soft to palpation; no organomegaly appreciated   Extrem: pulses full, equal, and regular over all 4 extremities; no UE edema, BL LE edema, Rt fem CVL    Significant Labs: All pertinent labs within the past 24 hours have been reviewed.  Blood Culture:   Recent Labs   Lab 01/01/24  0856   LABBLOO No Growth to date  No Growth to date  No Growth to date  No Growth to date     CBC:   Recent Labs   Lab 01/01/24  0311 01/02/24  0508   WBC 6.58 5.91   HGB  "7.6* 7.6*   HCT 23.9* 22.6*    148*     CMP:   Recent Labs   Lab 01/01/24  0310 01/02/24  0508    133*   K 4.7 5.0   CL 99 101   CO2 23 19*   * 167*   BUN 28* 34*   CREATININE 6.3* 7.2*   CALCIUM 8.4* 8.4*   PROT 6.1 6.2   ALBUMIN 2.2* 2.2*   BILITOT 0.3 0.3   ALKPHOS 59 57   AST 12 10   ALT 9* 7*   ANIONGAP 14 13     Respiratory Culture: No results for input(s): "GSRESP", "RESPIRATORYC" in the last 48 hours.  Troponin: No results for input(s): "TROPONINI", "TROPONINIHS" in the last 48 hours.    Significant Imaging: I have reviewed all pertinent imaging results/findings within the past 24 hours.    JAVY:     Left Ventricle: The left ventricle is normal in size. Increased ventricular mass. Increased wall thickness. There is concentric hypertrophy. Normal wall motion. There is normal systolic function with a visually estimated ejection fraction of 60 - 65%. Grade I diastolic dysfunction.    Right Ventricle: Normal right ventricular cavity size. Systolic function is normal. Pacemaker lead present in the ventricle with a 0.7 x 1.8 cm mobile, echogenic structure consistent with a vegetation.    Left Atrium: Left atrium is moderately dilated.    Right Atrium: Right atrium is mildly dilated.    Mitral Valve: The mitral valve is repaired by MitraClip. There is mild regurgitation.    Tricuspid Valve: The tricuspid valve is structurally normal. There is moderate regurgitation. The vegetation on the ventricular lead appears adjacent to but not adherent to the septal leaflet.    Pulmonary Artery: There is pulmonary hypertension. The estimated pulmonary artery systolic pressure is 76 mmHg.    IVC/SVC: Elevated venous pressure at 15 mmHg.    Pericardium: There is a trivial posterior effusion. No indication of cardiac tamponade.    Assessment/Plan:      * MRSA bacteremia  - MRSA on OSH Bcx since 12/18. 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  Last A1c reviewed-   Lab Results   Component Value Date    HGBA1C 8.0 (H) 12/19/2023     Most recent fingerstick glucose reviewed-   Recent Labs   Lab 01/01/24  2025 01/02/24  0806 01/02/24  1351 01/02/24  1602   POCTGLUCOSE 177* 192* 246* 252*       Current correctional scale  Medium  Maintain anti-hyperglycemic dose as follows-   Antihyperglycemics (From admission, onward)      Start     Stop Route Frequency Ordered    01/01/24 2100  insulin detemir U-100 (Levemir) pen 16 Units         -- SubQ 2 times daily 01/01/24 1223    12/31/23 1200  insulin aspart U-100 pen 0-5 Units         -- SubQ Before meals, nightly and at 0200 PRN 12/31/23 1151    12/31/23 0745  insulin aspart U-100 pen 6 Units         -- SubQ 3 times daily with meals 12/31/23 0744          Hold Oral hypoglycemics while patient is in the hospital.    Infection associated with cardiac device  - see MRSA bacteremia       Chronic kidney disease-mineral and bone disorder  Creatine stable for now. BMP reviewed- noted Estimated Creatinine Clearance: 9.3 mL/min (A) (based on SCr of 7.2 mg/dL (H)). according to latest data. Based on current GFR, CKD stage is end stage.  Monitor UOP and serial BMP and adjust therapy as needed. Renally dose meds. Avoid nephrotoxic medications and procedures.    - continue calcium acetate    Anemia due to stage 5 chronic kidney disease, not on chronic dialysis  Patient's anemia is currently uncontrolled. Has not  received any PRBCs to date. Etiology likely d/t chronic disease due to Chronic Kidney Disease/ESRD  Current CBC reviewed-   Lab Results   Component Value Date    HGB 7.6 (L) 01/02/2024    HCT 22.6 (L) 01/02/2024     Monitor serial CBC and transfuse if patient becomes hemodynamically unstable, symptomatic or H/H drops below 7/21.  - continue epo    ESRD (end stage renal disease)  - ESRD with trialysis line removed.   - Nephrology following- Rt fem CVL- will need clearance of blood cultures and ICD removal first prior to new TDC    Cardiac resynchronization therapy defibrillator (CRT-D) in place  - [employer] CRT-D implanted 2018      Nonrheumatic mitral valve regurgitation  - S/p mitraclip       Coronary artery disease involving native coronary artery of native heart without angina pectoris  - S/P PCI D2 with SHU x 1 (07/13/2016)  - S/P PCI OM1 with SHU x 1 (01/04/2017)  - S/P PCI prox D2 with SHU x 1 (03/07/2017)  - Patent stents with [facility] by [facility] 12/10/2019  - S/P C with RCA 90 % stenosis, SHU x 3 (4/5/2022)    Most recent C during admission. Continue aspirin, heparin drip.      VTE Risk Mitigation (From admission, onward)           Ordered     vancomycin (VANCOCIN) 4 mg, heparin (porcine) 5,000 Units IV catheter lock (total volume 2 mL)  Daily         01/01/24 0828     heparin (porcine) injection 1,000 Units  As needed (PRN)         12/30/23 1102     heparin 25,000 units in dextrose 5% (100 units/ml) IV bolus from bag - ADDITIONAL PRN BOLUS - 60 units/kg  As needed (PRN)        Question:  Heparin Infusion Adjustment (DO NOT MODIFY ANSWER)  Answer:  [email or web address]\Images\Pharmacy\HeparinInfusions\heparin HIGH INTENSITY nomogram for [facility] [id number]    12/29/23 0901     heparin 25,000 units in dextrose 5% (100 units/ml) IV bolus from bag - ADDITIONAL PRN BOLUS - 30 units/kg  As needed (PRN)        Question:  Heparin Infusion Adjustment (DO NOT MODIFY ANSWER)  Answer:   [email or web address]\epic\Images\Pharmacy\HeparinInfusions\heparin HIGH INTENSITY nomogram for [facility] [id number]    12/29/23 0901     heparin 25,000 units in dextrose 5% 250 mL (100 units/mL) infusion HIGH INTENSITY nomogram - [facility]  Continuous        Question:  Begin at (units/kg/hr)  Answer:  18    12/29/23 0901     heparin (porcine) injection 3,000 Units  Daily PRN         12/28/23 7451                    Discharge Planning   VIRA: 1/8/2024     Code Status: Full Code   Is the patient medically ready for discharge?: No    Reason for patient still in hospital (select all that apply): Patient trending condition  Discharge Plan A: Home with family, Home Health                    Yamilet Boyce MD  Department of Hospital Medicine   [facility] - Cardiology Stepdown

## 2024-01-03 NOTE — ASSESSMENT & PLAN NOTE
Patient's anemia is currently uncontrolled. Has not received any PRBCs to date. Etiology likely d/t chronic disease due to Chronic Kidney Disease/ESRD  Current CBC reviewed-   Lab Results   Component Value Date    HGB 7.6 (L) 01/02/2024    HCT 22.6 (L) 01/02/2024     Monitor serial CBC and transfuse if patient becomes hemodynamically unstable, symptomatic or H/H drops below 7/21.  - continue epo

## 2024-01-03 NOTE — ASSESSMENT & PLAN NOTE
- MRSA on OSH Bcx since 12/18. On daptomycin 6mg/kg Q48hr and cefepime at OSH  - Trialysis line removed due to concern for source  - 12/27 JAVY concern for vegetation reported  - ID consulted- continue dapto 10mg/kg and ceftaroline  - Bcx 12/29- 2/4 +iv MRSA  - Bcx 12/30- 2/4 +iv MRSA  - Bcx 01/01- NGTD  - continue vanc antibiotic locks  - EP consulted- JAVY performed w/RV lead vegetation- will need to undergo extraction and then possible re-implantation of leadless pacer after clearance of blood cultures -CTS consulted as well

## 2024-01-03 NOTE — ASSESSMENT & PLAN NOTE
- MRSA on OSH Bcx since 12/18. On daptomycin 6mg/kg Q48hr and cefepime at OSH  - Trialysis line removed due to concern for source  - 12/27 JAVY concern for vegetation reported  - ID consulted- continue dapto 10mg/kg and ceftaroline  - Bcx 12/29- 2/4 +iv MRSA  - Bcx 12/30- 2/4 +iv MRSA  - Bcx 01/01- NGTD  - continue vanc antibiotic locks  - EP consulted- JAVY performed w/RV lead vegetation- will need to undergo extraction and then possible re-implantation of leadless pacer after clearance of blood cultures -CTS consulted as well-- possible extraction tomorrow, 01/04- npo at midnight

## 2024-01-03 NOTE — ASSESSMENT & PLAN NOTE
Patient's FSGs are controlled on current medication regimen.  Last A1c reviewed-   Lab Results   Component Value Date    HGBA1C 8.0 (H) 12/19/2023     Most recent fingerstick glucose reviewed-   Recent Labs   Lab 01/01/24 2025 01/02/24  0806 01/02/24  1351 01/02/24  1602   POCTGLUCOSE 177* 192* 246* 252*       Current correctional scale  Medium  Maintain anti-hyperglycemic dose as follows-   Antihyperglycemics (From admission, onward)    Start     Stop Route Frequency Ordered    01/01/24 2100  insulin detemir U-100 (Levemir) pen 16 Units         -- SubQ 2 times daily 01/01/24 1223    12/31/23 1200  insulin aspart U-100 pen 0-5 Units         -- SubQ Before meals, nightly and at 0200 PRN 12/31/23 1151    12/31/23 0745  insulin aspart U-100 pen 6 Units         -- SubQ 3 times daily with meals 12/31/23 0744        Hold Oral hypoglycemics while patient is in the hospital.

## 2024-01-03 NOTE — PLAN OF CARE
Problem: Adult Inpatient Plan of Care  Goal: Plan of Care Review  Outcome: Ongoing, Progressing  Goal: Absence of Hospital-Acquired Illness or Injury  Outcome: Ongoing, Progressing  Goal: Optimal Comfort and Wellbeing  Outcome: Ongoing, Progressing     Problem: Diabetes Comorbidity  Goal: Blood Glucose Level Within Targeted Range  Outcome: Ongoing, Progressing     Problem: Fluid and Electrolyte Imbalance (Acute Kidney Injury/Impairment)  Goal: Fluid and Electrolyte Balance  Outcome: Ongoing, Progressing     Problem: Oral Intake Inadequate (Acute Kidney Injury/Impairment)  Goal: Optimal Nutrition Intake  Outcome: Ongoing, Progressing     Problem: Renal Function Impairment (Acute Kidney Injury/Impairment)  Goal: Effective Renal Function  Outcome: Ongoing, Progressing     Problem: Infection  Goal: Absence of Infection Signs and Symptoms  Outcome: Ongoing, Progressing     Problem: Device-Related Complication Risk (Hemodialysis)  Goal: Safe, Effective Therapy Delivery  Outcome: Ongoing, Progressing     Problem: Hemodynamic Instability (Hemodialysis)  Goal: Effective Tissue Perfusion  Outcome: Ongoing, Progressing     Problem: Infection (Hemodialysis)  Goal: Absence of Infection Signs and Symptoms  Outcome: Ongoing, Progressing    Pt AAOx4, VSS, breathing unlabored. Bed in lowest position, locked, and call light in reach. Spouse is @ bedside. Glucose monitored & supplemental insulin given. Isolation precautions initiated and maintained. Plan of care and medications discussed with pt. Pt has no questions at this time. Will continue plan of care.

## 2024-01-03 NOTE — PLAN OF CARE
AAOX4,VSS,O2 sats>92% on room air. Plan of care discussed with patient and friend.Patient has no complaints of pain/SOB.Patient's nausea being managed with PRN medication - no more episodes  of emesis since this AM. Discussed medications and care. Patient has no questions at this time.Pt visualised and stable.Call light within reach.Pt resting,bed at lowest position.    Problem: Adult Inpatient Plan of Care  Goal: Plan of Care Review  Outcome: Ongoing, Progressing  Goal: Patient-Specific Goal (Individualized)  Outcome: Ongoing, Progressing  Goal: Absence of Hospital-Acquired Illness or Injury  Outcome: Ongoing, Progressing  Goal: Optimal Comfort and Wellbeing  Outcome: Ongoing, Progressing  Goal: Readiness for Transition of Care  Outcome: Ongoing, Progressing     Problem: Diabetes Comorbidity  Goal: Blood Glucose Level Within Targeted Range  Outcome: Ongoing, Progressing     Problem: Fluid and Electrolyte Imbalance (Acute Kidney Injury/Impairment)  Goal: Fluid and Electrolyte Balance  Outcome: Ongoing, Progressing     Problem: Oral Intake Inadequate (Acute Kidney Injury/Impairment)  Goal: Optimal Nutrition Intake  Outcome: Ongoing, Progressing     Problem: Renal Function Impairment (Acute Kidney Injury/Impairment)  Goal: Effective Renal Function  Outcome: Ongoing, Progressing     Problem: Infection  Goal: Absence of Infection Signs and Symptoms  Outcome: Ongoing, Progressing     Problem: Device-Related Complication Risk (Hemodialysis)  Goal: Safe, Effective Therapy Delivery  Outcome: Ongoing, Progressing     Problem: Hemodynamic Instability (Hemodialysis)  Goal: Effective Tissue Perfusion  Outcome: Ongoing, Progressing     Problem: Infection (Hemodialysis)  Goal: Absence of Infection Signs and Symptoms  Outcome: Ongoing, Progressing     Problem: Fall Injury Risk  Goal: Absence of Fall and Fall-Related Injury  Outcome: Ongoing, Progressing

## 2024-01-03 NOTE — SUBJECTIVE & OBJECTIVE
"Interval HPI:   No acute events overnight. Patient in room 322/322 A. Blood glucose variable. BG at and above goal on current insulin regimen (SSI, prandial, and basal insulin ). Steroid use- None .   5 Days Post-Op  Renal function- Abnormal - Cr 7.2  Vasopressors-  None     Diet diabetic 2000 Calorie; Fluid - 1800mL  Diet NPO     Eatin%-100%, NPO tonight  Nausea: No  Hypoglycemia and intervention: No  Fever: No  TPN and/or TF: No    /63 (BP Location: Left arm, Patient Position: Lying)   Pulse 100   Temp 99 °F (37.2 °C) (Oral)   Resp 16   Ht 5' 7" (1.702 m)   Wt 78.5 kg (173 lb)   LMP  (LMP Unknown)   SpO2 (!) 91%   BMI 27.10 kg/m²     Labs Reviewed and Include    No results for input(s): "GLU", "CALCIUM", "ALBUMIN", "PROT", "NA", "K", "CO2", "CL", "BUN", "CREATININE", "ALKPHOS", "ALT", "AST", "BILITOT" in the last 24 hours.  Lab Results   Component Value Date    WBC 5.91 2024    HGB 7.6 (L) 2024    HCT 22.6 (L) 2024    MCV 90 2024     (L) 2024     No results for input(s): "TSH", "FREET4" in the last 168 hours.  Lab Results   Component Value Date    HGBA1C 8.0 (H) 2023       Nutritional status:   Body mass index is 27.1 kg/m².  Lab Results   Component Value Date    ALBUMIN 2.2 (L) 2024    ALBUMIN 2.2 (L) 2024    ALBUMIN 2.1 (L) 2023     No results found for: "PREALBUMIN"    Estimated Creatinine Clearance: 9.3 mL/min (A) (based on SCr of 7.2 mg/dL (H)).    Accu-Checks  Recent Labs     23  2040 24  0758 24  1114 24  1502 24  2025 24  0806 24  1351 24  1602 24  0810   POCTGLUCOSE 192* 223* 251* 206* 177* 192* 246* 252* 159* 289*       Current Medications and/or Treatments Impacting Glycemic Control  Immunotherapy:    Immunosuppressants       None          Steroids:   Hormones (From admission, onward)      Start     Stop Route Frequency Ordered    23 0020  melatonin " tablet 3 mg         -- Oral Nightly PRN 12/28/23 2331          Pressors:    Autonomic Drugs (From admission, onward)      None          Hyperglycemia/Diabetes Medications:   Antihyperglycemics (From admission, onward)      Start     Stop Route Frequency Ordered    01/01/24 2100  insulin detemir U-100 (Levemir) pen 16 Units         -- SubQ 2 times daily 01/01/24 1223    12/31/23 1200  insulin aspart U-100 pen 0-5 Units         -- SubQ Before meals, nightly and at 0200 PRN 12/31/23 1151    12/31/23 0745  insulin aspart U-100 pen 6 Units         -- SubQ 3 times daily with meals 12/31/23 0706

## 2024-01-03 NOTE — PROGRESS NOTES
[facility] - [facility] Step[facility]  Cardiac Electrophysiology  Progress Note    Admission Date: 12/28/2023  Code Status: Full Code   Attending Physician: Yamilet Boyce MD   Expected Discharge Date: 1/8/2024  Principal Problem:MRSA bacteremia    Subjective:     Interval History: No events overnight. Nausea/vomiting this morning. CTS unable to back up today due to OR scheduling. Hoping for tomorrow.    Review of Systems   Constitutional: Negative for diaphoresis and fever.   Cardiovascular:  Negative for chest pain, dyspnea on exertion, leg swelling, near-syncope, orthopnea, palpitations, paroxysmal nocturnal dyspnea and syncope.   Respiratory:  Negative for cough and shortness of breath.    Gastrointestinal:  Negative for abdominal pain, diarrhea, nausea and vomiting.   Neurological:  Negative for light-headedness.   Psychiatric/Behavioral:  Negative for altered mental status and substance abuse.      Objective:     Vital Signs (Most Recent):  Temp: 99 °F (37.2 °C) (01/03/24 0807)  Pulse: 100 (01/03/24 0807)  Resp: 16 (01/03/24 0807)  BP: 124/63 (01/03/24 0807)  SpO2: (!) 91 % (01/03/24 0807) Vital Signs (24h Range):  Temp:  [98.2 °F (36.8 °C)-100.3 °F (37.9 °C)] 99 °F (37.2 °C)  Pulse:  [] 100  Resp:  [16-20] 16  SpO2:  [91 %-100 %] 91 %  BP: (108-179)/(56-78) 124/63     Weight: 78.5 kg (173 lb)  Body mass index is 27.1 kg/m².     SpO2: (!) 91 %        Physical Exam  Constitutional:       Appearance: Normal appearance.   Cardiovascular:      Rate and Rhythm: Normal rate and regular rhythm.      Pulses: Normal pulses.      Heart sounds: Normal heart sounds.   Pulmonary:      Effort: Pulmonary effort is normal.      Breath sounds: Normal breath sounds. No wheezing or rales.   Abdominal:      General: Abdomen is flat. There is no distension.      Palpations: Abdomen is soft.      Tenderness: There is no abdominal tenderness.   Musculoskeletal:      Right lower leg: No edema.      Left lower leg: No edema.    Skin:     General: Skin is warm and dry.   Neurological:      Mental Status: She is alert and oriented to person, place, and time. Mental status is at baseline.   Psychiatric:         Mood and Affect: Mood normal.         Behavior: Behavior normal.            Significant Labs: All pertinent lab results from the last 24 hours have been reviewed.    Significant Imaging:  Reviewed    JAVY 12/29/23    JAVY performed for infective endocarditis evaluation. Study aborted early due to clinical status.    Left Ventricle: The left ventricle is normal in size. Normal wall thickness. Normal wall motion.    Right Ventricle: Normal right ventricular cavity size. Systolic function is normal. Pacemaker lead present in the ventricle.    Left Atrium: Left atrium is dilated. There is no thrombus in the left atrial cavity.    Right Atrium: Lead present in the right atrium. A large 0.7 x 2.6 cm mobile vegetation is attached to the ventricular lead within the right atrium. It does not appear to be attached to the tricuspid valve leaflets.     TTE 12/29/23    Left Ventricle: The left ventricle is normal in size. Increased ventricular mass. Increased wall thickness. There is concentric hypertrophy. Normal wall motion. There is normal systolic function with a visually estimated ejection fraction of 60 - 65%. Grade I diastolic dysfunction.    Right Ventricle: Normal right ventricular cavity size. Systolic function is normal. Pacemaker lead present in the ventricle with a 0.7 x 1.8 cm mobile, echogenic structure consistent with a vegetation.    Left Atrium: Left atrium is moderately dilated.    Right Atrium: Right atrium is mildly dilated.    Mitral Valve: The mitral valve is repaired by MitraClip. There is mild regurgitation.    Tricuspid Valve: The tricuspid valve is structurally normal. There is moderate regurgitation. The vegetation on the ventricular lead appears adjacent to but not adherent to the septal leaflet.    Pulmonary Artery:  There is pulmonary hypertension. The estimated pulmonary artery systolic pressure is 76 mmHg.    IVC/SVC: Elevated venous pressure at 15 mmHg.    Pericardium: There is a trivial posterior effusion. No indication of cardiac tamponade.  Assessment and Plan:     * MRSA bacteremia  #CRT-D in place  #Infection associated with cardiac device  58yoF with T1DM, ESRD on HD, Cad s/p PCI, iCM with prior EF 25-30% recovered to 55-60% with CRT-D in place (dual-coiled 2018 [employer] CRT-D), severe MR s/p MitraClip, and chronic thrombocytopenia is here as a transfer with bacteremia, vegetation involving her RV lead per outside JAVY. Unable to get good visualization with current imaging. Discussed with echo staff regarding need for further imaging lead extraction.     TTE with 0.7cmx1.8cm vegetation without evidence of TV involvement  JAVY agreed, no TV involvement. Measurement of vegetation on JAVY 0.7 x 2.6 cm  Bcx MRSA +  HD per groin trialysis (R Fem)  Formal interrogation showed she is not device-dependent    Recommendations:  - Tentative lead extraction 1/4/24  - CTS consulted, procedure pending availability for back up  - Heparin off 6 hours prior to procedure        Connor M Gillies, MD  Cardiac Electrophysiology  [facility] - [facility]iology Stepdown

## 2024-01-03 NOTE — ASSESSMENT & PLAN NOTE
Creatine stable for now. BMP reviewed- noted Estimated Creatinine Clearance: 11 mL/min (A) (based on SCr of 6.1 mg/dL (H)). according to latest data. Based on current GFR, CKD stage is end stage.  Monitor UOP and serial BMP and adjust therapy as needed. Renally dose meds. Avoid nephrotoxic medications and procedures.    - continue calcium acetate

## 2024-01-03 NOTE — SUBJECTIVE & OBJECTIVE
Interval History: No complaints this am. No sob, chest pain, n/v, fevers, chills, night sweats, abd pain, diarrhea, constipation.    Objective:     Vital Signs (Most Recent):  Temp: 98.2 °F (36.8 °C) (01/02/24 1605)  Pulse: 96 (01/02/24 1800)  Resp: 17 (01/02/24 1639)  BP: (!) 152/71 (01/02/24 1605)  SpO2: 100 % (01/02/24 1639) Vital Signs (24h Range):  Temp:  [97.8 °F (36.6 °C)-99 °F (37.2 °C)] 98.2 °F (36.8 °C)  Pulse:  [] 96  Resp:  [16-19] 17  SpO2:  [94 %-100 %] 100 %  BP: (108-160)/(56-83) 152/71     Weight: 78.5 kg (173 lb)  Body mass index is 27.1 kg/m².    Intake/Output Summary (Last 24 hours) at 1/2/2024 1928  Last data filed at 1/2/2024 1541  Gross per 24 hour   Intake 900 ml   Output 3800 ml   Net -2900 ml           Physical Exam  Gen: in NAD, appears stated age  Neuro: AAOx3, motor, sensory, and strength grossly intact BL  HEENT: NTNC, EOMI, PERRL, MMM, nodular presence of the Rt IJ area  CVS: RRR, no m/r/g; S1/S2 auscultated with no S3 or S4; capillary refill < 2 sec  Resp: lungs CTAB, no w/r/r; no belabored breathing or accessory muscle use appreciated   Abd: BS+ in all 4 quadrants; NTND, soft to palpation; no organomegaly appreciated   Extrem: pulses full, equal, and regular over all 4 extremities; no UE edema, BL LE edema, Rt fem CVL    Significant Labs: All pertinent labs within the past 24 hours have been reviewed.  Blood Culture:   Recent Labs   Lab 01/01/24  0856   LABBLOO No Growth to date  No Growth to date  No Growth to date  No Growth to date     CBC:   Recent Labs   Lab 01/01/24  0311 01/02/24  0508   WBC 6.58 5.91   HGB 7.6* 7.6*   HCT 23.9* 22.6*    148*     CMP:   Recent Labs   Lab 01/01/24  0310 01/02/24  0508    133*   K 4.7 5.0   CL 99 101   CO2 23 19*   * 167*   BUN 28* 34*   CREATININE 6.3* 7.2*   CALCIUM 8.4* 8.4*   PROT 6.1 6.2   ALBUMIN 2.2* 2.2*   BILITOT 0.3 0.3   ALKPHOS 59 57   AST 12 10   ALT 9* 7*   ANIONGAP 14 13     Respiratory Culture: No  "results for input(s): "GSRESP", "RESPIRATORYC" in the last 48 hours.  Troponin: No results for input(s): "TROPONINI", "TROPONINIHS" in the last 48 hours.    Significant Imaging: I have reviewed all pertinent imaging results/findings within the past 24 hours.    JAVY:     Left Ventricle: The left ventricle is normal in size. Increased ventricular mass. Increased wall thickness. There is concentric hypertrophy. Normal wall motion. There is normal systolic function with a visually estimated ejection fraction of 60 - 65%. Grade I diastolic dysfunction.    Right Ventricle: Normal right ventricular cavity size. Systolic function is normal. Pacemaker lead present in the ventricle with a 0.7 x 1.8 cm mobile, echogenic structure consistent with a vegetation.    Left Atrium: Left atrium is moderately dilated.    Right Atrium: Right atrium is mildly dilated.    Mitral Valve: The mitral valve is repaired by MitraClip. There is mild regurgitation.    Tricuspid Valve: The tricuspid valve is structurally normal. There is moderate regurgitation. The vegetation on the ventricular lead appears adjacent to but not adherent to the septal leaflet.    Pulmonary Artery: There is pulmonary hypertension. The estimated pulmonary artery systolic pressure is 76 mmHg.    IVC/SVC: Elevated venous pressure at 15 mmHg.    Pericardium: There is a trivial posterior effusion. No indication of cardiac tamponade.  "

## 2024-01-03 NOTE — SUBJECTIVE & OBJECTIVE
"Interval History: Nausea and several bouts of vomiting last night and this am. KUB w/large amount of stool burden and large amount of stool in the rectal vault. No sob, chest pain, fevers, chills, night sweats, abd pain, diarrhea, constipation.    Objective:     Vital Signs (Most Recent):  Temp: 97.3 °F (36.3 °C) (01/03/24 1145)  Pulse: 99 (01/03/24 1509)  Resp: 17 (01/03/24 1145)  BP: 125/69 (01/03/24 1145)  SpO2: (!) 94 % (01/03/24 1145) Vital Signs (24h Range):  Temp:  [97.3 °F (36.3 °C)-100.3 °F (37.9 °C)] 97.3 °F (36.3 °C)  Pulse:  [] 99  Resp:  [16-20] 17  SpO2:  [91 %-100 %] 94 %  BP: (123-179)/(60-78) 125/69     Weight: 78.5 kg (173 lb)  Body mass index is 27.1 kg/m².    Intake/Output Summary (Last 24 hours) at 1/3/2024 1600  Last data filed at 1/3/2024 1448  Gross per 24 hour   Intake 120 ml   Output --   Net 120 ml           Physical Exam  Gen: in NAD, appears stated age, appears acutely ill   Neuro: AAOx3, motor, sensory, and strength grossly intact BL  HEENT: NTNC, EOMI, PERRL, MMM, nodular presence of the Rt IJ area  CVS: RRR, no m/r/g; S1/S2 auscultated with no S3 or S4; capillary refill < 2 sec  Resp: lungs CTAB, no w/r/r; no belabored breathing or accessory muscle use appreciated   Abd: BS+ in all 4 quadrants; NTND, soft to palpation; no organomegaly appreciated   Extrem: pulses full, equal, and regular over all 4 extremities; no UE edema, BL LE edema, Rt fem CVL    Significant Labs: All pertinent labs within the past 24 hours have been reviewed.  Blood Culture:   No results for input(s): "LABBLOO" in the last 48 hours.    CBC:   Recent Labs   Lab 01/02/24  0508 01/03/24  0857   WBC 5.91 10.31   HGB 7.6* 8.2*   HCT 22.6* 25.0*   * 159     CMP:   Recent Labs   Lab 01/02/24  0508 01/03/24  0857   * 134*   K 5.0 4.5    97   CO2 19* 24   * 289*   BUN 34* 20   CREATININE 7.2* 6.1*   CALCIUM 8.4* 8.7   PROT 6.2 6.7   ALBUMIN 2.2* 2.4*   BILITOT 0.3 0.4   ALKPHOS 57 59   AST " "10 11   ALT 7* 9*   ANIONGAP 13 13     Respiratory Culture: No results for input(s): "GSRESP", "RESPIRATORYC" in the last 48 hours.  Troponin: No results for input(s): "TROPONINI", "TROPONINIHS" in the last 48 hours.    Significant Imaging: I have reviewed all pertinent imaging results/findings within the past 24 hours.    KUB:  FINDINGS:  Cardiomegaly, status post left subclavian pacing defibrillator device therapy, elevation right hemidiaphragm, mitral valve metal density stable.  Insertion right transfemoral dual lumen catheter, tip IVC lumen at L3-L4 disc level.  Nondistended air GI track.  Large amount of stool large bowel, including rectum, the possibility of pelvic floor relaxation question.  Amorphous 2-3 mm size calcific density central left pelvis suspicious for calcified fibroids.  Evidence of splenomegaly 16.5 cm vertical height.     Impression:     Constipation.     Incidental splenomegaly.  Additional findings above.     This report was flagged in Epic as abnormal    JAVY:     Left Ventricle: The left ventricle is normal in size. Increased ventricular mass. Increased wall thickness. There is concentric hypertrophy. Normal wall motion. There is normal systolic function with a visually estimated ejection fraction of 60 - 65%. Grade I diastolic dysfunction.    Right Ventricle: Normal right ventricular cavity size. Systolic function is normal. Pacemaker lead present in the ventricle with a 0.7 x 1.8 cm mobile, echogenic structure consistent with a vegetation.    Left Atrium: Left atrium is moderately dilated.    Right Atrium: Right atrium is mildly dilated.    Mitral Valve: The mitral valve is repaired by MitraClip. There is mild regurgitation.    Tricuspid Valve: The tricuspid valve is structurally normal. There is moderate regurgitation. The vegetation on the ventricular lead appears adjacent to but not adherent to the septal leaflet.    Pulmonary Artery: There is pulmonary hypertension. The estimated " pulmonary artery systolic pressure is 76 mmHg.    IVC/SVC: Elevated venous pressure at 15 mmHg.    Pericardium: There is a trivial posterior effusion. No indication of cardiac tamponade.

## 2024-01-03 NOTE — ASSESSMENT & PLAN NOTE
Patient's FSGs are controlled on current medication regimen.  Last A1c reviewed-   Lab Results   Component Value Date    HGBA1C 8.0 (H) 12/19/2023     Most recent fingerstick glucose reviewed-   Recent Labs   Lab 01/02/24 2002 01/03/24  0810 01/03/24  1013 01/03/24  1304   POCTGLUCOSE 159* 289* 312* 267*       Current correctional scale  Medium  Maintain anti-hyperglycemic dose as follows-   Antihyperglycemics (From admission, onward)    Start     Stop Route Frequency Ordered    01/01/24 2100  insulin detemir U-100 (Levemir) pen 16 Units         -- SubQ 2 times daily 01/01/24 1223    12/31/23 1200  insulin aspart U-100 pen 0-5 Units         -- SubQ Before meals, nightly and at 0200 PRN 12/31/23 1151    12/31/23 0745  insulin aspart U-100 pen 6 Units         -- SubQ 3 times daily with meals 12/31/23 0744        Hold Oral hypoglycemics while patient is in the hospital.

## 2024-01-03 NOTE — PROGRESS NOTES
[facility] - Cardiology Stepdown  Endocrinology  Progress Note    Admit Date: 12/28/2023     Reason for Consult: Management of VANESSA (managed as T1DM), Hyperglycemia     Diabetes diagnosis year: 2008 (likely had issues with decreasing pancreatic function prior)    Home Diabetes Medications:  Lantus 30 units nightly; Humalog 5 units with meals + SSI (ISF 1:10)    How often checking glucose at home? >4 x day (Dexcom G6)  BG readings on regimen: 100-150's  Hypoglycemia on the regimen?  No  Missed doses on regimen?  No    Diabetes Complications include:     Hyperglycemia, Hypoglycemia , Hypoglycemia unawareness, Diabetic chronic kidney disease     , Diabetic retinopathy , Diabetic cataract , and Diabetic peripheral neuropathy     Complicating diabetes co morbidities:   HTN; HLD; CAD; ESRD on HD      HPI: Ellen Roman is a 57 yo female with hx of DM1, HTN, HLD, CAD s/p PCI with AICD placement, Mitral Regurgitation s/p clip, and ESRD on iHD MWF who presents as a transfer from OSH for higher level of care.  According to records, patient initially presented to OSH on 12/18 with chief complaint of R sided chest pain associated with N/V x 2 days with low grade temp.  Cardiology evaluated there and performed a LCH on 12/20 but no PCI was performed and they recommended medical management for her CAD.  Her BC grew MRSA bacteremia that remained positive during the admission with source suspected to be from her RIJ Tunneled dialysis catheter. The TDC was removed and IR was consulted for tempoary line placement and she was found to have a thrombosed LIJ from her previous catheter and a temporary catheter had to be placed to the R femoral vein.  Her BC continued to remain positive despite treatment with Vancomycin and a JAVY was ordered with findings of vegetations to the AICD lead wire and she was then transferred to [facility] for higher level of care. Endocrine consulted to manage type 1 diabetes. Of note, chart has mixed information  "regarding T1DM vs. T2DM. Suppressed C-peptide noted at [facility], but cannot find antibody labs to confirm VANESSA vs. Pancreatic DM.      Lab Results   Component Value Date    HGBA1C 8.0 (H) 2023             Interval HPI:   No acute events overnight. Patient in room 322/322 A. Blood glucose variable. BG at and above goal on current insulin regimen (SSI, prandial, and basal insulin ). Steroid use- None .   5 Days Post-Op  Renal function- Abnormal - Cr 7.2  Vasopressors-  None     Diet diabetic 2000 Calorie; Fluid - 1800mL  Diet NPO     Eatin%-100%, NPO tonight  Nausea: No  Hypoglycemia and intervention: No  Fever: No  TPN and/or TF: No    /63 (BP Location: Left arm, Patient Position: Lying)   Pulse 100   Temp 99 °F (37.2 °C) (Oral)   Resp 16   Ht 5' 7" (1.702 m)   Wt 78.5 kg (173 lb)   LMP  (LMP Unknown)   SpO2 (!) 91%   BMI 27.10 kg/m²     Labs Reviewed and Include    No results for input(s): "GLU", "CALCIUM", "ALBUMIN", "PROT", "NA", "K", "CO2", "CL", "BUN", "CREATININE", "ALKPHOS", "ALT", "AST", "BILITOT" in the last 24 hours.  Lab Results   Component Value Date    WBC 5.91 2024    HGB 7.6 (L) 2024    HCT 22.6 (L) 2024    MCV 90 2024     (L) 2024     No results for input(s): "TSH", "FREET4" in the last 168 hours.  Lab Results   Component Value Date    HGBA1C 8.0 (H) 2023       Nutritional status:   Body mass index is 27.1 kg/m².  Lab Results   Component Value Date    ALBUMIN 2.2 (L) 2024    ALBUMIN 2.2 (L) 2024    ALBUMIN 2.1 (L) 2023     No results found for: "PREALBUMIN"    Estimated Creatinine Clearance: 9.3 mL/min (A) (based on SCr of 7.2 mg/dL (H)).    Accu-Checks  Recent Labs     23  2040 24  0758 24  1114 24  1502 24  2025 24  0806 24  1351 24  1602 24  0810   POCTGLUCOSE 192* 223* 251* 206* 177* 192* 246* 252* 159* 289* "       Current Medications and/or Treatments Impacting Glycemic Control  Immunotherapy:    Immunosuppressants       None          Steroids:   Hormones (From admission, onward)      Start     Stop Route Frequency Ordered    12/29/23 0020  melatonin tablet 3 mg         -- Oral Nightly PRN 12/28/23 2331          Pressors:    Autonomic Drugs (From admission, onward)      None          Hyperglycemia/Diabetes Medications:   Antihyperglycemics (From admission, onward)      Start     Stop Route Frequency Ordered    01/01/24 2100  insulin detemir U-100 (Levemir) pen 16 Units         -- SubQ 2 times daily 01/01/24 1223    12/31/23 1200  insulin aspart U-100 pen 0-5 Units         -- SubQ Before meals, nightly and at 0200 PRN 12/31/23 1151    12/31/23 0745  insulin aspart U-100 pen 6 Units         -- SubQ 3 times daily with meals 12/31/23 0744            ASSESSMENT and PLAN    Renal/  ESRD (end stage renal disease)  Titrate insulin slowly to avoid hypoglycemia as the risk of hypoglycemia increases with decreased creatinine clearance.      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  ESRD.  AICD infection.  BG at and above goal.  Will continue on same regimen for now, and adjust cautiously given ESRD.  NPO tomorrow.   Pt vomiting overnight and not taking in meals today. Spoke with nurse about holding mealtime insulin if vomiting, but can continue to give sliding scale as well as basal.    - Levemir (Insulin Detemir) 16 units BID (Pt T1DM, please still give while NPO)  - Novolog (Insulin Aspart) 6 units  (Hold while NPO)  - Continue LDC (150/50) prn ac/hs for hyperglycemia given poor renal function   - BG checks AC/HS  - Hypoglycemia protocol in place    ** Please notify Endocrine for any change and/or advance in diet**  ** Please call Endocrine for  any BG related issues **    Discharge Planning:   TBD. Please notify endocrinology prior to discharge.        Melecio Zuleta PA-C  Endocrinology  [facility] - Cardiology Stepdown

## 2024-01-03 NOTE — ASSESSMENT & PLAN NOTE
- S/P PCI D2 with SHU x 1 (07/13/2016)  - S/P PCI OM1 with SHU x 1 (01/04/2017)  - S/P PCI prox D2 with SHU x 1 (03/07/2017)  - Patent stents with NCCAD by [facility] 12/10/2019  - S/P [facility] with RCA 90 % stenosis, SHU x 3 (4/5/2022)    Most recent C during admission. Continue aspirin, heparin drip.

## 2024-01-03 NOTE — SUBJECTIVE & OBJECTIVE
Interval History: No events overnight. Nausea/vomiting this morning. CTS unable to back up today due to OR scheduling. Hoping for tomorrow.    Review of Systems   Constitutional: Negative for diaphoresis and fever.   Cardiovascular:  Negative for chest pain, dyspnea on exertion, leg swelling, near-syncope, orthopnea, palpitations, paroxysmal nocturnal dyspnea and syncope.   Respiratory:  Negative for cough and shortness of breath.    Gastrointestinal:  Negative for abdominal pain, diarrhea, nausea and vomiting.   Neurological:  Negative for light-headedness.   Psychiatric/Behavioral:  Negative for altered mental status and substance abuse.      Objective:     Vital Signs (Most Recent):  Temp: 99 °F (37.2 °C) (01/03/24 0807)  Pulse: 100 (01/03/24 0807)  Resp: 16 (01/03/24 0807)  BP: 124/63 (01/03/24 0807)  SpO2: (!) 91 % (01/03/24 0807) Vital Signs (24h Range):  Temp:  [98.2 °F (36.8 °C)-100.3 °F (37.9 °C)] 99 °F (37.2 °C)  Pulse:  [] 100  Resp:  [16-20] 16  SpO2:  [91 %-100 %] 91 %  BP: (108-179)/(56-78) 124/63     Weight: 78.5 kg (173 lb)  Body mass index is 27.1 kg/m².     SpO2: (!) 91 %        Physical Exam  Constitutional:       Appearance: Normal appearance.   Cardiovascular:      Rate and Rhythm: Normal rate and regular rhythm.      Pulses: Normal pulses.      Heart sounds: Normal heart sounds.   Pulmonary:      Effort: Pulmonary effort is normal.      Breath sounds: Normal breath sounds. No wheezing or rales.   Abdominal:      General: Abdomen is flat. There is no distension.      Palpations: Abdomen is soft.      Tenderness: There is no abdominal tenderness.   Musculoskeletal:      Right lower leg: No edema.      Left lower leg: No edema.   Skin:     General: Skin is warm and dry.   Neurological:      Mental Status: She is alert and oriented to person, place, and time. Mental status is at baseline.   Psychiatric:         Mood and Affect: Mood normal.         Behavior: Behavior normal.             Significant Labs: All pertinent lab results from the last 24 hours have been reviewed.    Significant Imaging:  Reviewed    JAVY 12/29/23    JAVY performed for infective endocarditis evaluation. Study aborted early due to clinical status.    Left Ventricle: The left ventricle is normal in size. Normal wall thickness. Normal wall motion.    Right Ventricle: Normal right ventricular cavity size. Systolic function is normal. Pacemaker lead present in the ventricle.    Left Atrium: Left atrium is dilated. There is no thrombus in the left atrial cavity.    Right Atrium: Lead present in the right atrium. A large 0.7 x 2.6 cm mobile vegetation is attached to the ventricular lead within the right atrium. It does not appear to be attached to the tricuspid valve leaflets.     TTE 12/29/23    Left Ventricle: The left ventricle is normal in size. Increased ventricular mass. Increased wall thickness. There is concentric hypertrophy. Normal wall motion. There is normal systolic function with a visually estimated ejection fraction of 60 - 65%. Grade I diastolic dysfunction.    Right Ventricle: Normal right ventricular cavity size. Systolic function is normal. Pacemaker lead present in the ventricle with a 0.7 x 1.8 cm mobile, echogenic structure consistent with a vegetation.    Left Atrium: Left atrium is moderately dilated.    Right Atrium: Right atrium is mildly dilated.    Mitral Valve: The mitral valve is repaired by MitraClip. There is mild regurgitation.    Tricuspid Valve: The tricuspid valve is structurally normal. There is moderate regurgitation. The vegetation on the ventricular lead appears adjacent to but not adherent to the septal leaflet.    Pulmonary Artery: There is pulmonary hypertension. The estimated pulmonary artery systolic pressure is 76 mmHg.    IVC/SVC: Elevated venous pressure at 15 mmHg.    Pericardium: There is a trivial posterior effusion. No indication of cardiac tamponade.   Complex Repair And Dermal Autograft Text: The defect edges were debeveled with a #15 scalpel blade.  The primary defect was closed partially with a complex linear closure.  Given the location of the defect, shape of the defect and the proximity to free margins an dermal autograft was deemed most appropriate to repair the remaining defect.  The graft was trimmed to fit the size of the remaining defect.  The graft was then placed in the primary defect, oriented appropriately, and sutured into place.

## 2024-01-03 NOTE — CLINICAL REVIEW
Nephrology Chart Review      Intake/Output Summary (Last 24 hours) at 1/3/2024 1109  Last data filed at 1/3/2024 0922  Gross per 24 hour   Intake 900 ml   Output 3800 ml   Net -2900 ml       Vitals:    01/03/24 0442 01/03/24 0500 01/03/24 0714 01/03/24 0807   BP:  (!) 179/78 123/61 124/63   BP Location:  Right arm Right forearm Left arm   Patient Position:  Lying Lying Lying   Pulse: 101 (!) 114 106 100   Resp: 18 20 18 16   Temp:  98.9 °F (37.2 °C) 98.2 °F (36.8 °C) 99 °F (37.2 °C)   TempSrc:  Oral Oral Oral   SpO2: (!) 94% 98% 95% (!) 91%   Weight:       Height:           Recent Labs   Lab 12/29/23  0004 12/30/23  0705 01/01/24  0310 01/02/24  0508 01/03/24  0857   *  135*   < > 136 133* 134*   K 4.1  4.1   < > 4.7 5.0 4.5   CL 97  97   < > 99 101 97   CO2 26  26   < > 23 19* 24   BUN 21*  21*   < > 28* 34* 20   CREATININE 4.1*  4.1*   < > 6.3* 7.2* 6.1*   CALCIUM 7.6*  7.6*   < > 8.4* 8.4* 8.7   PHOS 3.5  --   --   --   --     < > = values in this interval not displayed.           No other related issues identified. Please call Nephrology as needed; We will continue to follow.

## 2024-01-03 NOTE — ASSESSMENT & PLAN NOTE
BG goal 140-180  T1DM.  ESRD.  AICD infection.  BG at and above goal.  Will continue on same regimen for now, and adjust cautiously given ESRD.  NPO tomorrow.      - Levemir (Insulin Detemir) 16 units BID (Pt T1DM, please still give while NPO)  - Novolog (Insulin Aspart) 6 units  (Hold while NPO)  - Continue LDC (150/50) prn ac/hs for hyperglycemia given poor renal function   - BG checks AC/HS  - Hypoglycemia protocol in place    ** Please notify Endocrine for any change and/or advance in diet**  ** Please call Endocrine for any BG related issues **    Discharge Planning:   TBD. Please notify endocrinology prior to discharge.

## 2024-01-03 NOTE — ANESTHESIA PREPROCEDURE EVALUATION
[facility]  Anesthesia Pre-Operative Evaluation   01/03/2024        Ellen Roman, 1965  [id number]  Procedure(s) (LRB):  EXTRACTION, ELECTRODE LEAD (N/A)  Transesophageal echo (JAVY) intra-procedure log documentation (N/A)    Subjective    Ellen Roman is a 58 y.o. female w/ a significant PMHx of T1DM, gastroparesis, retinopathy, ESRD on HD, HTN, pHTN, HLD, CAD s/p PCI, ischemic cardiomyopathy, AICD, severe MR s/p Mitraclip who presented with chest pain found to have MRSA bacteremia. Vegetation found on ICD lead. PASP around 76  mmHG. On entresto and beta blocker.     Patient now presents for above procedure(s).     Prev Airway:   Induction:  Intravenous    Intubated:  Postinduction    Mask Ventilation:  Easy with oral airway    Attempts:  1    Attempted By:  Student    Method of Intubation:  Video laryngoscopy    Blade:  Riggins 3    Laryngeal View Grade: Grade I - full view of cords      Difficult Airway Encountered?: No      Complications:  None    Airway Device:  Oral endotracheal tube    Airway Device Size:  7.0    Style/Cuff Inflation:  Cuffed (inflated to minimal occlusive pressure)    Tube secured:  22    Secured at:  The lips    Placement Verified By:  Capnometry and Revisualization with laryngoscopy    Complicating Factors:  None    Findings Post-Intubation:  BS equal bilateral and atraumatic/condition of teeth unchanged     (RETIRED) PICC Line - Triple Lumen 12/21/23 2200 (Active)   Number of days: 12       Trialysis (Dialysis) Catheter 12/27/23 0913 right femoral (Active)   Line Necessity Review CRRT/HD 01/03/24 0900   Site Assessment No drainage;No redness;No swelling;No warmth 01/03/24 0900   Line Securement Device Secured with sutures 01/03/24 0900   Dressing Type CHG impregnated dressing/sponge;Central line dressing 01/03/24 0900   Dressing Status Clean;Dry;Intact 01/03/24 0900   Dressing Intervention Integrity maintained 01/03/24 0900   Date on Dressing 02/28/24 01/02/24 1728    Dressing Due to be Changed 01/04/24 01/02/24 1929   Venous Patency/Care flushed w/o difficulty 01/02/24 1929   Arterial Patency/Care flushed w/o difficulty 01/02/24 1929   Flows Good 01/02/24 0738   Number of days: 7       Drips:    heparin (porcine) in D5W 23 Units/kg/hr (01/03/24 0943)       Patient Active Problem List   Diagnosis    Primary hypertension    Coronary artery disease involving native coronary artery of native heart without angina pectoris    Ischemic dilated cardiomyopathy    LBBB (left bundle branch block)    S/P coronary angioplasty    Nonrheumatic mitral valve regurgitation    Migraine    Cardiac resynchronization therapy defibrillator (CRT-D) in place    CHF (congestive heart failure)    MARIAM (acute kidney injury)    Arthritis    Cataract    Pulmonary HTN    Nonrheumatic tricuspid valve regurgitation    Atherosclerotic renal artery stenosis    Nonintractable headache    Acute hypoxemic respiratory failure    Patient cannot afford medications/ Noncompliance     E. coli UTI    Mixed sleep apnea    Chronic anticoagulation    Diabetes mellitus due to underlying condition with both eyes affected by severe nonproliferative retinopathy without macular edema, without long-term current use of insulin    Nuclear sclerotic cataract of both eyes    Anemia    Chronic systolic heart failure    SVT (supraventricular tachycardia)    S/P catheter ablation of slow pathway    Edema of left upper arm    Hyperkalemia    Depression    Combined form of age-related cataract, both eyes    Hypertensive retinopathy of both eyes    Dry eye syndrome of both eyes    GERD (gastroesophageal reflux disease)    CKD stage 5 due to type 2 diabetes mellitus    Anemia, iron deficiency    Elevated troponin    Hypotension    Nephrotic syndrome    Secondary hyperparathyroidism    Metabolic acidosis    ESRD (end stage renal disease)    MRSA bacteremia    Anemia due to stage 5 chronic kidney disease, not on chronic dialysis    Chronic  kidney disease-mineral and bone disorder    Infection associated with cardiac device    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), managed as type 1       Review of patient's allergies indicates:  No Known Allergies    Current Inpatient Medications:    sodium chloride 0.9%   Intravenous Once    albuterol sulfate  2.5 mg Nebulization Q4H    aspirin  81 mg Oral Daily    atorvastatin  40 mg Oral Daily    calcium acetate(phosphat bind)  667 mg Oral TID WM    ceftaroline (TEFLARO) IVPB  200 mg Intravenous TID    DAPTOmycin (CUBICIN) IV (PEDS and ADULTS)  10 mg/kg Intravenous Q48H    docusate sodium  50 mg Oral Daily    epoetin john  100 Units/kg Intravenous Every Tues, Thurs, Sat    furosemide  80 mg Oral Daily    heparin (PORCINE)  80 Units/kg (Adjusted) Intravenous Once    hydrALAZINE  25 mg Oral Q8H    insulin aspart U-100  6 Units Subcutaneous TIDWM    insulin detemir U-100  16 Units Subcutaneous BID    metoprolol succinate  50 mg Oral Daily    mupirocin   Nasal BID    pantoprazole  40 mg Oral Daily    polyethylene glycol  17 g Oral TID    pregabalin  50 mg Oral Daily    sacubitriL-valsartan  1 tablet Oral BID    senna  8.6 mg Oral Daily    vancomycin (VANCOCIN) 4 mg, heparin (porcine) 5,000 Units IV catheter lock (total volume 2 mL)   Intra-Catheter Daily       Current Facility-Administered Medications on File Prior to Encounter   Medication Dose Route Frequency Provider Last Rate Last Admin    0.9%  NaCl infusion (for blood administration)   Intravenous Q24H PRN Manav Schneider MD        0.9%  NaCl infusion (for blood administration)   Intravenous Q24H PRN Manav Schneider MD        benzocaine 20 % oral spray   Mouth/Throat QID PRN Leslie Fishman MD        sodium chloride 0.9% flush 10 mL  10 mL Intravenous PRN Manav Schneider MD         Current Outpatient Medications on File Prior to Encounter   Medication Sig Dispense Refill    alirocumab (PRALUENT PEN) 150 mg/mL PnCarly Inject 1 mL (150 mg total) into  the skin every 14 (fourteen) days. 2 each 11    atorvastatin (LIPITOR) 40 MG tablet Take 1 tablet by mouth nightly. 90 tablet 1    calcitRIOL (ROCALTROL) 0.25 MCG Cap Take 1 capsule by mouth once daily. 90 capsule 3    cloNIDine (CATAPRES) 0.3 MG tablet Take 1 tablet by mouth 2 (two) times daily. 180 tablet 3    clopidogreL (PLAVIX) 75 mg tablet Take 1 tablet by mouth once daily. 90 tablet 3    diphenhydrAMINE (BENADRYL) 25 mg capsule Take 1 capsule (25 mg total) by mouth nightly as needed for Itching. 30 capsule 11    doxazosin (CARDURA) 8 MG Tab Take 1 tablet by mouth nightly. 30 tablet 11    ENTRESTO  mg per tablet TAKE 1 TABLET(S) BY MOUTH TWO TIMES A  tablet 3    famotidine (PEPCID) 20 MG tablet Take 1 tablet by mouth once daily. 90 tablet 3    furosemide (LASIX) 20 MG tablet Take 1 tablet by mouth once daily. 90 tablet 3    hydrALAZINE (APRESOLINE) 50 MG tablet Take 1 tablet by mouth 2 (two) times a day (morning and before bed). 60 tablet 1    metoprolol succinate (TOPROL-XL) 100 MG 24 hr tablet Take 1 tablet by mouth once daily. 90 tablet 3    metroNIDAZOLE (FLAGYL) 500 MG tablet Take 1 tablet in the morning and 1 tablet before bedtime for 7 days. 14 tablet 0    NIFEdipine (PROCARDIA-XL) 60 MG (OSM) 24 hr tablet Take 1 tablet by mouth once daily. 90 tablet 3    ondansetron (ZOFRAN-ODT) 4 MG TbDL Take 1 tablet by mouth.      ondansetron (ZOFRAN-ODT) 4 MG TbDL Take 1 tablet by mouth every 8 (eight) hours as needed for up to 7 days. 10 tablet 0    pantoprazole (PROTONIX) 40 MG tablet Take 1 tablet by mouth once daily. 90 tablet 3    prochlorperazine (COMPAZINE) 10 MG tablet Take 1 tablet by mouth every 6 (six) hours as needed for up to 7 days. 25 tablet 0    promethazine (PHENERGAN) 25 MG tablet Take 1 tablet by mouth 4 (four) times daily as needed for nausea for up to 7 days 15 tablet 0    thiamine (VITAMIN B-1) 100 MG tablet Take 1 tablet (100 mg total) by mouth once daily. 90 tablet 3     vancomycin (VANCOCIN) 125 MG capsule Take 1 capsule by mouth in the morning and 1capsule by mouth at noon and 1 capsule by mouth in the evening and 1 capsule by mouth before bedtime for 10 days. 40 capsule 0    amoxicillin-clavulanate 500-125mg (AUGMENTIN) 500-125 mg Tab Take 1 tablet by mouth every morning for 2 days 2 tablet 0    blood sugar diagnostic (TRUE METRIX GLUCOSE TEST STRIP MISC) True Metrix Glucose Test Strip   USE TO TEST BLOOD SUGAR 3 TIMES DAILY      blood sugar diagnostic Strp Use to test blood sugar 3 (three) times daily before meals. 100 strip 11    blood-glucose meter Misc Use as directed 1 each 1    blood-glucose meter Misc Use to test blood sugar as directed 1 each 0    blood-glucose sensor (DEXCOM G7 SENSOR) Gini Apply one to skin as directed and change q 10 days. 3 each 11    bumetanide (BUMEX) 2 MG tablet Take 1 tablet by mouth once daily. 30 tablet 11    calcitRIOL (ROCALTROL) 0.25 MCG Cap Take 1 capsule by mouth once daily. 90 capsule 3    calcium acetate,phosphat bind, (PHOSLO) 667 mg tablet Take 1 tablet by mouth in the morning and 1 tablet at noon and 1 tablet in the evening with meals. 270 tablet 0    epoetin john (PROCRIT) 20,000 unit/mL injection Inject 1 ml under the skin every two weeks. Administer only if hemoglobin is <10.5. 2 mL 5    insulin glargine 100 units/mL SubQ pen Inject 30 Units into the skin nightly. 15 mL 0    insulin lispro 100 unit/mL injection Inject 5 Units into the skin 3 (three) times daily with meals. Discard vial 28 days after opening. 10 mL 0    insulin lispro 100 unit/mL injection Inject 0-20 Units into the skin 4 (four) times daily before meals and nightly. If BS : 0 units, -200: 4 units, -250: 8 units; -300: 12 units, -350: 16 units, -400: 20 units, BS > 400: 20 units and call your doctor 20 mL 0    insulin lispro protamin-lispro (HUMALOG MIX 50-50 KWIKPEN) 100 unit/mL (50-50) InPn Inject 10 Units into the skin 3 (three)  "times daily before meals. 15 mL 11    metoclopramide HCl (REGLAN) 10 MG tablet Take 1 tablet by mouth 4 (four) times daily before meals and nightly. 40 tablet 0    nitroGLYCERIN (NITROSTAT) 0.4 MG SL tablet Place 1 tablet under the tongue every 5 (five) minutes as needed for chest pain.  Max of 3 tablets in a 15 minute period. 25 tablet 1    venlafaxine (EFFEXOR-XR) 37.5 MG 24 hr capsule Take 1 capsule by mouth daily 30 capsule 11    [DISCONTINUED] aspirin 81 MG Chew Take 81 mg by mouth.      [DISCONTINUED] folic acid (FOLVITE) 1 MG tablet Take 5 tablets by mouth daily for 10 days, THEN take 1 tablet daily for 80 days 130 tablet 0    [DISCONTINUED] insulin syringe-needle U-100 1 mL 31 gauge x 15/64" Syrg Inject 1 Syringe into the skin once daily. 100 each 3    [DISCONTINUED] lancets (ONETOUCH ULTRASOFT LANCETS) Misc Use 1 lancet to check blood sugar 4 (four) times daily. 100 each 3    [DISCONTINUED] losartan (COZAAR) 100 MG tablet Take 1 tablet  by mouth nightly for blood pressure. 90 tablet 4       Past Surgical History:   Procedure Laterality Date    ABLATION OF ARRHYTHMOGENIC FOCUS FOR SUPRAVENTRICULAR TACHYCARDIA WITH ELECTROPHYSIOLOGY STUDY N/A 10/06/2021    Procedure: Ablation SVT;  Surgeon: Joby Gutiérrez MD;  Location: [facility] EP LAB;  Service: Cardiology;  Laterality: N/A;    ANGIOGRAM, CORONARY, WITH LEFT HEART CATHETERIZATION  04/05/2022    Procedure: Angiogram, Coronary, with Left Heart Cath;  Surgeon: Manav Schneider MD;  Location: [facility] CATH LAB;  Service: Cardiology;;    BREAST BIOPSY Left     BREAST SURGERY Left     left biopsy    CARDIAC CATHETERIZATION      CARDIAC DEFIBRILLATOR PLACEMENT      CARDIAC ELECTROPHYSIOLOGY STUDY WITH DRUG CHALLENGE  10/06/2021    Procedure: EP Drug challenge;  Surgeon: Joby Gutiérrez MD;  Location: [facility] EP LAB;  Service: Cardiology;;    CATHETERIZATION OF BOTH LEFT AND RIGHT HEART N/A 12/10/2019    Procedure: CATHETERIZATION, HEART, BOTH LEFT AND RIGHT;  Surgeon: LUIS ALFREDO Stewart " MD Awais;  Location: [facility] CATH LAB;  Service: Cardiology;  Laterality: N/A;    ECHOCARDIOGRAM,TRANSESOPHAGEAL N/A 12/29/2023    Procedure: Transesophageal echo (JAVY) intra-procedure log documentation;  Surgeon: Vielka Anaya Diagnostic;  Location: [facility] EP LAB;  Service: Cardiology;  Laterality: N/A;    TONSILLECTOMY      TRANSESOPHAGEAL ECHOCARDIOGRAPHY  07/11/2016    TRANSESOPHAGEAL ECHOCARDIOGRAPHY N/A 01/10/2022    Procedure: ECHOCARDIOGRAM, TRANSESOPHAGEAL;  Surgeon: Leslie Fishman MD;  Location: [facility] CATH LAB;  Service: Cardiology;  Laterality: N/A;    TRANSESOPHAGEAL ECHOCARDIOGRAPHY N/A 8/17/2022    Procedure: ECHOCARDIOGRAM, TRANSESOPHAGEAL;  Surgeon: Manav Schneider MD;  Location: [facility] CATH LAB;  Service: Cardiology;  Laterality: N/A;    TUBAL LIGATION      VASCULAR SURGERY         Social History:  Tobacco Use: Low Risk  (12/30/2023)    Patient History     Smoking Tobacco Use: Never     Smokeless Tobacco Use: Never     Passive Exposure: Not on file       Alcohol Use: Not At Risk (12/29/2023)    AUDIT-C     Frequency of Alcohol Consumption: Never     Average Number of Drinks: Patient does not drink     Frequency of Binge Drinking: Never       Objective    Vital Signs Range:  BMI Readings from Last 1 Encounters:   12/29/23 27.10 kg/m²       Temp:  [36.3 °C (97.3 °F)-37.2 °C (99 °F)]   Pulse:  []   Resp:  [16-20]   BP: (123-179)/(61-78)   SpO2:  [91 %-98 %]        Significant Labs:        Component Value Date/Time    WBC 10.31 01/03/2024 0857    HGB 8.2 (L) 01/03/2024 0857    HCT 25.0 (L) 01/03/2024 0857    HCT 25.9 05/20/2022 0409     01/03/2024 0857     (L) 01/03/2024 0857     10/31/2019 0450    K 4.5 01/03/2024 0857    K 3.8 10/31/2019 0450    CL 97 01/03/2024 0857    CO2 24 01/03/2024 0857    CO2 31 10/31/2019 0450     (H) 01/03/2024 0857    BUN 20 01/03/2024 0857    CREATININE 6.1 (H) 01/03/2024 0857    CREATININE 1.60 (H) 10/31/2019 0450    MG 1.9 01/03/2024  0857    PHOS 3.5 12/29/2023 0004    CALCIUM 8.7 01/03/2024 0857    ALBUMIN 2.4 (L) 01/03/2024 0857    PROT 6.7 01/03/2024 0857    ALKPHOS 59 01/03/2024 0857    BILITOT 0.4 01/03/2024 0857    AST 11 01/03/2024 0857    ALT 9 (L) 01/03/2024 0857    INR 1.0 01/03/2024 0857    HGBA1C 8.0 (H) 12/19/2023 0517    HGBA1C 9.6 (H) 04/13/2022 1359        Please see Results Review for additional labs.     Diagnostic Studies: All relevant studies, reviewed.      EKG:   Results for orders placed or performed in visit on 03/23/23   IN OFFICE EKG 12-LEAD (to [place])    Collection Time: 03/23/23  9:17 AM    Narrative    Test Reason : I25.10,    Vent. Rate : 070 BPM     Atrial Rate : 070 BPM     P-R Int : 138 ms          QRS Dur : 132 ms      QT Int : 490 ms       P-R-T Axes : 044 209 117 degrees     QTc Int : 529 ms    AV dual-paced rhythm  Biventricular pacemaker detected  Abnormal ECG  When compared with ECG of 15-SEP-2022 09:17,  No significant change was found  Confirmed by Manav Schneider MD ([id number]) on 3/24/2023 2:41:56 PM    Referred By:             Confirmed By:Manav Schneider MD       ECHO:  Results for orders placed during the hospital encounter of 12/28/23    Echo    Interpretation Summary    Left Ventricle: The left ventricle is normal in size. Increased ventricular mass. Increased wall thickness. There is concentric hypertrophy. Normal wall motion. There is normal systolic function with a visually estimated ejection fraction of 60 - 65%. Grade I diastolic dysfunction.    Right Ventricle: Normal right ventricular cavity size. Systolic function is normal. Pacemaker lead present in the ventricle with a 0.7 x 1.8 cm mobile, echogenic structure consistent with a vegetation.    Left Atrium: Left atrium is moderately dilated.    Right Atrium: Right atrium is mildly dilated.    Mitral Valve: The mitral valve is repaired by MitraClip. There is mild regurgitation.    Tricuspid Valve: The tricuspid valve is structurally normal. There is  moderate regurgitation. The vegetation on the ventricular lead appears adjacent to but not adherent to the septal leaflet.    Pulmonary Artery: There is pulmonary hypertension. The estimated pulmonary artery systolic pressure is 76 mmHg.    IVC/SVC: Elevated venous pressure at 15 mmHg.    Pericardium: There is a trivial posterior effusion. No indication of cardiac tamponade.        Pre-op Assessment    I have reviewed the Patient Summary Reports.     I have reviewed the Nursing Notes. I have reviewed the NPO Status.   I have reviewed the Medications.     Review of Systems  Anesthesia Hx:   Neg history of prior surgery.          Denies Family Hx of Anesthesia complications.    Denies Personal Hx of Anesthesia complications.                    Social:  Non-Smoker       Hematology/Oncology:       -- Denies Anemia:                                  Cardiovascular:     Hypertension   Denies MI. CAD    Denies CABG/stent. Dysrhythmias   CHF       ECG has been reviewed.                          Pulmonary:    Denies COPD.  Denies Asthma.    Sleep Apnea                Renal/:  Chronic Renal Disease                Hepatic/GI:     GERD Denies Liver Disease.            Musculoskeletal:         Denies Spine Disorders             Neurological:    Denies CVA.   Headaches Denies Seizures.                                Endocrine:  Diabetes Denies Hypothyroidism.          Psych:  Psychiatric History                  Physical Exam  General: Well nourished, Cooperative, Alert and Oriented    Airway:  Mallampati: III   Mouth Opening: Normal  TM Distance: Normal  Neck ROM: Normal ROM    Dental:  Edentulous        Anesthesia Plan  Type of Anesthesia, risks & benefits discussed:    Anesthesia Type: Gen ETT  Intra-op Monitoring Plan: Standard ASA Monitors  Post Op Pain Control Plan: multimodal analgesia and IV/PO Opioids PRN  Induction:  IV  Airway Plan: Direct and Video  Informed Consent: Informed consent signed with the Patient and all  parties understand the risks and agree with anesthesia plan.  All questions answered. Patient consented to blood products? Yes  ASA Score: 3  Day of Surgery Review of History & Physical: H&P Update referred to the surgeon/provider.    Ready For Surgery From Anesthesia Perspective.     .

## 2024-01-03 NOTE — NURSING
Pt unable to tolerate PO medications at this time due to nausea/vomiting. MD Austen notified. PRN nausea medicine given.

## 2024-01-03 NOTE — ASSESSMENT & PLAN NOTE
Patient's anemia is currently uncontrolled. Has not received any PRBCs to date. Etiology likely d/t chronic disease due to Chronic Kidney Disease/ESRD  Current CBC reviewed-   Lab Results   Component Value Date    HGB 8.2 (L) 01/03/2024    HCT 25.0 (L) 01/03/2024     Monitor serial CBC and transfuse if patient becomes hemodynamically unstable, symptomatic or H/H drops below 7/21.  - continue epo

## 2024-01-03 NOTE — ASSESSMENT & PLAN NOTE
Creatine stable for now. BMP reviewed- noted Estimated Creatinine Clearance: 9.3 mL/min (A) (based on SCr of 7.2 mg/dL (H)). according to latest data. Based on current GFR, CKD stage is end stage.  Monitor UOP and serial BMP and adjust therapy as needed. Renally dose meds. Avoid nephrotoxic medications and procedures.    - continue calcium acetate

## 2024-01-03 NOTE — ASSESSMENT & PLAN NOTE
- ESRD with trialysis line removed.   - Nephrology following- Rt fem CVL- will need clearance of blood cultures and ICD removal first prior to new TDC

## 2024-01-04 PROBLEM — I38 ENDOCARDITIS: Status: RESOLVED | Noted: 2024-01-01 | Resolved: 2024-01-01

## 2024-01-04 NOTE — ASSESSMENT & PLAN NOTE
CIED (pacemaker lead vegetation on JAVY) from persistent MRSA bacteremia on salvage therapy, bacteremia present for > two weeks, cx persistently positive from 12/18 onwards. Transferred from OSH to [facility] for evaluation for extraction.  Blood cultures on 12/8 with C perfringes and MRSA. Repeat blood cultures  from admission 12/18-30 with MRSA. Patient is afebrile, without leukocytosis and stable hemodynamics. Most recent Bcx 01/01/24 so far with no growth, may be headed towards clearance. Extraction with EP planned 1/04.     Recommendations  Continue daptomycin and Ceftaroline for salvage therapy.   Follow up repeat blood cultures, check CPK weekly, follow up cultures from extraction planned.   She may be a candidate for chronic suppressive therapy later if remnant pieces of lead are left but device extraction will still help significantly with improving her odds of clinical resolution and lowering chances for recurrence.   At least six weeks of therapy from source control and microbiological clearance (appears to have cleared 1/01).

## 2024-01-04 NOTE — SUBJECTIVE & OBJECTIVE
Interval History: TAYLOR. No new joint pain or back pain.    Review of Systems  Constitutional:  Negative for fever.   HENT:  Negative for trouble swallowing.    Respiratory:  Negative for cough.    Cardiovascular:  Negative for chest pain.   Gastrointestinal:  Negative for abdominal pain, diarrhea and vomiting.   Musculoskeletal:  Negative for arthralgias and back pain.   Psychiatric/Behavioral:  Negative for confusion.    Objective:     Vital Signs (Most Recent):  Temp: 98.5 °F (36.9 °C) (01/04/24 1142)  Pulse: 86 (01/04/24 1143)  Resp: 18 (01/04/24 1143)  BP: 133/72 (01/04/24 1142)  SpO2: 98 % (01/04/24 1143) Vital Signs (24h Range):  Temp:  [97 °F (36.1 °C)-98.5 °F (36.9 °C)] 98.5 °F (36.9 °C)  Pulse:  [77-99] 86  Resp:  [16-20] 18  SpO2:  [96 %-100 %] 98 %  BP: ()/(51-72) 133/72     Weight: 78.4 kg (172 lb 13.5 oz)  Body mass index is 27.07 kg/m².    Estimated Creatinine Clearance: 31.9 mL/min (A) (based on SCr of 2.1 mg/dL (H)).     Physical Exam    Constitutional:       Appearance: Normal appearance. She is not ill-appearing or toxic-appearing.   HENT:      Head: Normocephalic and atraumatic.      Nose: Nose normal.      Mouth/Throat:      Mouth: Mucous membranes are moist.      Pharynx: Oropharynx is clear.   Eyes:      Conjunctiva/sclera: Conjunctivae normal.   Cardiovascular:      Rate and Rhythm: Normal rate and regular rhythm.      Pulses: Normal pulses.      Heart sounds: Normal heart sounds.   Pulmonary:      Effort: Pulmonary effort is normal.      Breath sounds: Normal breath sounds.   Abdominal:      General: Bowel sounds are normal.      Palpations: Abdomen is soft.   Musculoskeletal:         General: No swelling or deformity.      Cervical back: Neck supple.   Skin:     General: Skin is warm and dry.      Comments: R femoral HD line site c/d/i   Neurological:      Mental Status: She is alert and oriented to person, place, and time. Mental status is at baseline.   Psychiatric:         Behavior:  Behavior normal.         Thought Content: Thought content normal.   Significant Labs:   Microbiology Results (last 7 days)       Procedure Component Value Units Date/Time    Blood culture [[id number]] Collected: 01/01/24 0856    Order Status: Completed Specimen: Blood from Antecubital, Right Updated: 01/04/24 1212     Blood Culture, Routine No Growth to date      No Growth to date      No Growth to date      No Growth to date    Blood culture [[id number]] Collected: 01/01/24 0856    Order Status: Completed Specimen: Blood Updated: 01/04/24 1212     Blood Culture, Routine No Growth to date      No Growth to date      No Growth to date      No Growth to date    Blood culture [[id number]]     Order Status: Canceled Specimen: Blood     Blood culture [[id number]]     Order Status: Canceled Specimen: Blood     Blood culture [[id number]]  (Abnormal) Collected: 12/30/23 1739    Order Status: Completed Specimen: Blood Updated: 01/03/24 0950     Blood Culture, Routine Gram stain marcella bottle: Gram positive cocci in clusters resembling Staph      Positive results previously called 12/31/2023  22:59      Gram stain aer bottle: Gram positive cocci in clusters resembling Staph      METHICILLIN RESISTANT STAPHYLOCOCCUS AUREUS  ID consult required at [facility].  For susceptibility see order #[id number]      Blood culture [[id number]]  (Abnormal) Collected: 12/30/23 1741    Order Status: Completed Specimen: Blood Updated: 01/03/24 0949     Blood Culture, Routine Gram stain aer bottle: Gram positive cocci in clusters resembling Staph      Positive results previously called 01/01/2024  01:12      Gram stain marcella bottle: Gram positive cocci in clusters resembling Staph      Positive results previously called 01/01/2024  09:19      METHICILLIN RESISTANT STAPHYLOCOCCUS AUREUS  ID consult required at [facility].  For susceptibility see order #[id number]      Blood culture  [[id number]]  (Abnormal)  (Susceptibility) Collected: 12/29/23 0004    Order Status: Completed Specimen: Blood Updated: 01/03/24 0845     Blood Culture, Routine Gram stain aer bottle: Gram positive cocci in clusters resembling Staph      Results called to and read back by: Mary Lopez RN 12/30/2023  09:26      METHICILLIN RESISTANT STAPHYLOCOCCUS AUREUS  ID consult required at [facility].      Blood culture [[id number]]  (Abnormal) Collected: 12/29/23 0004    Order Status: Completed Specimen: Blood Updated: 01/01/24 0831     Blood Culture, Routine Gram stain aer bottle: Gram positive cocci in clusters resembling Staph      Positive results previously called 12/30/2023      METHICILLIN RESISTANT STAPHYLOCOCCUS AUREUS  ID consult required at [facility].  For susceptibility see order # [id number]      MRSA/SA Rapid ID by PCR from Blood culture [[id number]]  (Abnormal) Collected: 12/29/23 0004    Order Status: Completed Updated: 12/30/23 1037     Staph aureus ID by PCR Positive     Methicillin Resistant ID by PCR Positive          All pertinent labs within the past 24 hours have been reviewed.  Recent Lab Results  (Last 5 results in the past 24 hours)        01/04/24  1139   01/04/24  1040   01/04/24  0721   01/04/24  0719   01/04/24  0522        Albumin   2.4             Anion Gap   8             aPTT         35.4  Comment: Refer to local heparin nomogram for intensity/dose specific   therapeutic   range.         BUN   9             Calcium   8.5             Chloride   103             CO2   23             Creatinine   2.1             eGFR   27.0             Glucose   105             Phosphorus Level   1.8             POCT Glucose 121     213   205         Potassium   3.1             Sodium   134                                    Significant Imaging: I have reviewed all pertinent imaging results/findings within the past 24 hours.

## 2024-01-04 NOTE — PROGRESS NOTES
[facility] - Cardiology Stepdown  Cardiac Electrophysiology  Progress Note    Admission Date: 12/28/2023  Code Status: Full Code   Attending Physician: Aileen Wade MD   Expected Discharge Date: 1/8/2024  Principal Problem:Endocarditis    Subjective:     Interval History: No events overnight. CTS able to back up this afternoon. Planning for early afternoon CRT-D extraction.     Review of Systems   Constitutional: Negative for diaphoresis and fever.   Cardiovascular:  Negative for chest pain, dyspnea on exertion, leg swelling, near-syncope, orthopnea, palpitations, paroxysmal nocturnal dyspnea and syncope.   Respiratory:  Negative for cough and shortness of breath.    Gastrointestinal:  Negative for abdominal pain, diarrhea, nausea and vomiting.   Neurological:  Negative for light-headedness.   Psychiatric/Behavioral:  Negative for altered mental status and substance abuse.      Objective:     Vital Signs (Most Recent):  Temp: 98.2 °F (36.8 °C) (01/04/24 0727)  Pulse: 79 (01/04/24 0845)  Resp: 18 (01/04/24 0727)  BP: (!) 109/56 (01/04/24 0845)  SpO2: 99 % (01/04/24 0727) Vital Signs (24h Range):  Temp:  [97 °F (36.1 °C)-98.5 °F (36.9 °C)] 98.2 °F (36.8 °C)  Pulse:  [] 79  Resp:  [17-20] 18  SpO2:  [94 %-100 %] 99 %  BP: ()/(51-69) 109/56     Weight: 78.4 kg (172 lb 13.5 oz)  Body mass index is 27.07 kg/m².     SpO2: 99 %        Physical Exam  Constitutional:       Appearance: Normal appearance.   Cardiovascular:      Rate and Rhythm: Normal rate and regular rhythm.      Pulses: Normal pulses.      Heart sounds: Normal heart sounds.   Pulmonary:      Effort: Pulmonary effort is normal.      Breath sounds: Normal breath sounds. No wheezing or rales.   Abdominal:      General: Abdomen is flat. There is no distension.      Palpations: Abdomen is soft.      Tenderness: There is no abdominal tenderness.   Musculoskeletal:      Right lower leg: No edema.      Left lower leg: No edema.   Skin:     General: Skin  is warm and dry.   Neurological:      Mental Status: She is alert and oriented to person, place, and time. Mental status is at baseline.   Psychiatric:         Mood and Affect: Mood normal.         Behavior: Behavior normal.            Significant Labs: All pertinent lab results from the last 24 hours have been reviewed.    Significant Imaging:  Reviewed    JAVY 12/29/23    JAVY performed for infective endocarditis evaluation. Study aborted early due to clinical status.    Left Ventricle: The left ventricle is normal in size. Normal wall thickness. Normal wall motion.    Right Ventricle: Normal right ventricular cavity size. Systolic function is normal. Pacemaker lead present in the ventricle.    Left Atrium: Left atrium is dilated. There is no thrombus in the left atrial cavity.    Right Atrium: Lead present in the right atrium. A large 0.7 x 2.6 cm mobile vegetation is attached to the ventricular lead within the right atrium. It does not appear to be attached to the tricuspid valve leaflets.     TTE 12/29/23    Left Ventricle: The left ventricle is normal in size. Increased ventricular mass. Increased wall thickness. There is concentric hypertrophy. Normal wall motion. There is normal systolic function with a visually estimated ejection fraction of 60 - 65%. Grade I diastolic dysfunction.    Right Ventricle: Normal right ventricular cavity size. Systolic function is normal. Pacemaker lead present in the ventricle with a 0.7 x 1.8 cm mobile, echogenic structure consistent with a vegetation.    Left Atrium: Left atrium is moderately dilated.    Right Atrium: Right atrium is mildly dilated.    Mitral Valve: The mitral valve is repaired by MitraClip. There is mild regurgitation.    Tricuspid Valve: The tricuspid valve is structurally normal. There is moderate regurgitation. The vegetation on the ventricular lead appears adjacent to but not adherent to the septal leaflet.    Pulmonary Artery: There is pulmonary  hypertension. The estimated pulmonary artery systolic pressure is 76 mmHg.    IVC/SVC: Elevated venous pressure at 15 mmHg.    Pericardium: There is a trivial posterior effusion. No indication of cardiac tamponade.  Assessment and Plan:     MRSA bacteremia  #CRT-D in place  #Infection associated with cardiac device  58yoF with T1DM, ESRD on HD, Cad s/p PCI, iCM with prior EF 25-30% recovered to 55-60% with CRT-D in place (dual-coiled 2018 [employer] CRT-D), severe MR s/p MitraClip, and chronic thrombocytopenia is here as a transfer with bacteremia, vegetation involving her RV lead per outside JAVY. Unable to get good visualization with current imaging. Discussed with echo staff regarding need for further imaging lead extraction.     TTE with 0.7cmx1.8cm vegetation without evidence of TV involvement  JAVY agreed, no TV involvement. Measurement of vegetation on JAVY 0.7 x 2.6 cm  Bcx MRSA +  HD per groin trialysis (R Fem)  Formal interrogation showed she is not device-dependent    Recommendations:  - CRT-D extraction today  - Not pacer dependent, will not need temporary pacing        Connor M Gillies, MD  Cardiac Electrophysiology  [facility] - Cardiology Stepdown

## 2024-01-04 NOTE — SUBJECTIVE & OBJECTIVE
Interval History: No events overnight. CTS able to back up this afternoon. Planning for early afternoon CRT-D extraction.     Review of Systems   Constitutional: Negative for diaphoresis and fever.   Cardiovascular:  Negative for chest pain, dyspnea on exertion, leg swelling, near-syncope, orthopnea, palpitations, paroxysmal nocturnal dyspnea and syncope.   Respiratory:  Negative for cough and shortness of breath.    Gastrointestinal:  Negative for abdominal pain, diarrhea, nausea and vomiting.   Neurological:  Negative for light-headedness.   Psychiatric/Behavioral:  Negative for altered mental status and substance abuse.      Objective:     Vital Signs (Most Recent):  Temp: 98.2 °F (36.8 °C) (01/04/24 0727)  Pulse: 79 (01/04/24 0845)  Resp: 18 (01/04/24 0727)  BP: (!) 109/56 (01/04/24 0845)  SpO2: 99 % (01/04/24 0727) Vital Signs (24h Range):  Temp:  [97 °F (36.1 °C)-98.5 °F (36.9 °C)] 98.2 °F (36.8 °C)  Pulse:  [] 79  Resp:  [17-20] 18  SpO2:  [94 %-100 %] 99 %  BP: ()/(51-69) 109/56     Weight: 78.4 kg (172 lb 13.5 oz)  Body mass index is 27.07 kg/m².     SpO2: 99 %        Physical Exam  Constitutional:       Appearance: Normal appearance.   Cardiovascular:      Rate and Rhythm: Normal rate and regular rhythm.      Pulses: Normal pulses.      Heart sounds: Normal heart sounds.   Pulmonary:      Effort: Pulmonary effort is normal.      Breath sounds: Normal breath sounds. No wheezing or rales.   Abdominal:      General: Abdomen is flat. There is no distension.      Palpations: Abdomen is soft.      Tenderness: There is no abdominal tenderness.   Musculoskeletal:      Right lower leg: No edema.      Left lower leg: No edema.   Skin:     General: Skin is warm and dry.   Neurological:      Mental Status: She is alert and oriented to person, place, and time. Mental status is at baseline.   Psychiatric:         Mood and Affect: Mood normal.         Behavior: Behavior normal.            Significant Labs:  All pertinent lab results from the last 24 hours have been reviewed.    Significant Imaging:  Reviewed    JAVY 12/29/23    JAVY performed for infective endocarditis evaluation. Study aborted early due to clinical status.    Left Ventricle: The left ventricle is normal in size. Normal wall thickness. Normal wall motion.    Right Ventricle: Normal right ventricular cavity size. Systolic function is normal. Pacemaker lead present in the ventricle.    Left Atrium: Left atrium is dilated. There is no thrombus in the left atrial cavity.    Right Atrium: Lead present in the right atrium. A large 0.7 x 2.6 cm mobile vegetation is attached to the ventricular lead within the right atrium. It does not appear to be attached to the tricuspid valve leaflets.     TTE 12/29/23    Left Ventricle: The left ventricle is normal in size. Increased ventricular mass. Increased wall thickness. There is concentric hypertrophy. Normal wall motion. There is normal systolic function with a visually estimated ejection fraction of 60 - 65%. Grade I diastolic dysfunction.    Right Ventricle: Normal right ventricular cavity size. Systolic function is normal. Pacemaker lead present in the ventricle with a 0.7 x 1.8 cm mobile, echogenic structure consistent with a vegetation.    Left Atrium: Left atrium is moderately dilated.    Right Atrium: Right atrium is mildly dilated.    Mitral Valve: The mitral valve is repaired by MitraClip. There is mild regurgitation.    Tricuspid Valve: The tricuspid valve is structurally normal. There is moderate regurgitation. The vegetation on the ventricular lead appears adjacent to but not adherent to the septal leaflet.    Pulmonary Artery: There is pulmonary hypertension. The estimated pulmonary artery systolic pressure is 76 mmHg.    IVC/SVC: Elevated venous pressure at 15 mmHg.    Pericardium: There is a trivial posterior effusion. No indication of cardiac tamponade.

## 2024-01-04 NOTE — NURSING
Dialysis tx started to the right fem dialysis cath per orders placed by Dr. Cole:    Order Questions    Question Answer   Antibiotics on HD? No   Duration of Treatment 3 hours   Dialyzer F160   Dialysate Temperature (C) 36.5    mL/min    mL/min   K+ Potassium per Protocol   Ca++ Calcium per Protocol   Na+ Sodium per Protocol   Bicarb Bicarbonate per Protocol   Access Other (please specify)   Fluid Removal (L) 2L   Fluid Removal Instructions maintain SBP > 90 mmHG   Dialysate Bath Solution Protocol (DO NOT MODIFY ANSWER) [email or web address]\[facility] Dialysate Bath Solution Algorithm (formatted with date).pdf       Contact Isolations maintained

## 2024-01-04 NOTE — NURSING TRANSFER
Nursing Transfer Note      1/4/2024   1:30 PM      Reason patient is being transferred: procedure    Transfer To: EP lab    Transfer via stretcher    Transfer with cardiac monitoring    Transported by transportation team    Telemetry: Box Number    Order for Tele Monitor? Yes    Any special needs or follow-up needed: NPO    Patient belongings transferred with patient: No    Chart send with patient: Yes    Notified: spouse

## 2024-01-04 NOTE — SUBJECTIVE & OBJECTIVE
Interval History: Patient seen this morning, on HD. No overnight events.     Review of patient's allergies indicates:  No Known Allergies  Current Facility-Administered Medications   Medication Frequency    0.9%  NaCl infusion PRN    0.9%  NaCl infusion Once    acetaminophen tablet 650 mg Q6H PRN    albuterol sulfate nebulizer solution 2.5 mg Q4H    albuterol-ipratropium 2.5 mg-0.5 mg/3 mL nebulizer solution 3 mL Q4H PRN    aspirin chewable tablet 81 mg Daily    atorvastatin tablet 40 mg Daily    benzonatate capsule 200 mg Q8H PRN    calcium acetate(phosphat bind) capsule 667 mg TID WM    ceftaroline fosamiL (TEFLARO) 200 mg in dextrose 5 % (D5W) 50 mL IVPB TID    DAPTOmycin (CUBICIN) 785 mg in sodium chloride 0.9% SolP 50 mL IVPB Q48H    dextrose 50 % in water (D50W) injection 50 mL PRN    docusate sodium capsule 50 mg Daily    epoetin john injection 7,900 Units Every Tues, Thurs, Sat    furosemide tablet 80 mg Daily    glucagon (human recombinant) injection 1 mg PRN    glucose chewable tablet 16 g PRN    glucose chewable tablet 24 g PRN    heparin (porcine) injection 1,000 Units PRN    heparin (porcine) injection 3,000 Units Daily PRN    heparin 25,000 units in dextrose 5% (100 units/ml) IV bolus from bag - ADDITIONAL PRN BOLUS - 30 units/kg PRN    heparin 25,000 units in dextrose 5% (100 units/ml) IV bolus from bag - ADDITIONAL PRN BOLUS - 60 units/kg PRN    heparin 25,000 units in dextrose 5% (100 units/ml) IV bolus from bag INITIAL BOLUS Once    hydrALAZINE injection 10 mg Q8H PRN    hydrALAZINE tablet 25 mg Q8H    insulin aspart U-100 pen 0-5 Units AC + HS + 0200 PRN    insulin aspart U-100 pen 6 Units TIDWM    insulin detemir U-100 (Levemir) pen 16 Units BID    melatonin tablet 3 mg Nightly PRN    metoprolol succinate (TOPROL-XL) 24 hr tablet 50 mg Daily    mupirocin 2 % ointment BID    ondansetron injection 4 mg Q8H PRN    pantoprazole EC tablet 40 mg Daily    polyethylene glycol packet 17 g TID     pregabalin capsule 50 mg Daily    prochlorperazine injection Soln 2.5 mg Q4H PRN    promethazine tablet 25 mg Q6H PRN    sacubitriL-valsartan 24-26 mg per tablet 1 tablet BID    senna tablet 8.6 mg Daily    sodium chloride 0.9% bolus 250 mL 250 mL PRN    sodium chloride 0.9% flush 10 mL PRN    vancomycin (VANCOCIN) 4 mg, heparin (porcine) 5,000 Units IV catheter lock (total volume 2 mL) Daily     Facility-Administered Medications Ordered in Other Encounters   Medication Frequency    0.9%  NaCl infusion (for blood administration) Q24H PRN    0.9%  NaCl infusion (for blood administration) Q24H PRN    benzocaine 20 % oral spray QID PRN    sodium chloride 0.9% flush 10 mL PRN       Objective:     Vital Signs (Most Recent):  Temp: 98.2 °F (36.8 °C) (01/04/24 0727)  Pulse: 88 (01/04/24 0745)  Resp: 18 (01/04/24 0727)  BP: 112/60 (01/04/24 0745)  SpO2: 99 % (01/04/24 0727) Vital Signs (24h Range):  Temp:  [97 °F (36.1 °C)-98.5 °F (36.9 °C)] 98.2 °F (36.8 °C)  Pulse:  [] 88  Resp:  [17-20] 18  SpO2:  [94 %-100 %] 99 %  BP: ()/(51-69) 112/60     Weight: 78.4 kg (172 lb 13.5 oz) (01/04/24 0436)  Body mass index is 27.07 kg/m².  Body surface area is 1.93 meters squared.    I/O last 3 completed shifts:  In: 1040 [P.O.:1040]  Out: -      Physical Exam  Constitutional:       General: She is not in acute distress.     Appearance: Normal appearance. She is not ill-appearing or toxic-appearing.   HENT:      Head: Atraumatic.      Right Ear: External ear normal.      Left Ear: External ear normal.      Nose: Nose normal.      Mouth/Throat:      Mouth: Mucous membranes are moist.   Eyes:      Extraocular Movements: Extraocular movements intact.   Cardiovascular:      Rate and Rhythm: Normal rate and regular rhythm.      Pulses: Normal pulses.      Heart sounds: Normal heart sounds.   Pulmonary:      Effort: Pulmonary effort is normal.      Breath sounds: Normal breath sounds.   Abdominal:      General: Abdomen is flat.       Palpations: Abdomen is soft.   Musculoskeletal:         General: Normal range of motion.      Right lower leg: No edema.      Left lower leg: No edema.   Skin:     General: Skin is warm.      Capillary Refill: Capillary refill takes less than 2 seconds.   Neurological:      Mental Status: She is alert and oriented to person, place, and time.          Significant Labs:  CBC:   Recent Labs   Lab 01/03/24  0857   WBC 10.31   RBC 2.76*   HGB 8.2*   HCT 25.0*      MCV 91   MCH 29.7   MCHC 32.8       CMP:   Recent Labs   Lab 01/03/24  0857   *   CALCIUM 8.7   ALBUMIN 2.4*   PROT 6.7   *   K 4.5   CO2 24   CL 97   BUN 20   CREATININE 6.1*   ALKPHOS 59   ALT 9*   AST 11   BILITOT 0.4       All labs within the past 24 hours have been reviewed.     Significant Imaging:

## 2024-01-04 NOTE — PLAN OF CARE
Patient abiding by NPO diet. No complaints of pain. Heparin gtt stopped. CHG bath and linen change complete.  Vitals stable overnight. Lead extraction and dialysis planned for today. No complains overnight. Call bell in reach, and bed in lowest position. Plan of care continued.  Problem: Adult Inpatient Plan of Care  Goal: Plan of Care Review  Outcome: Ongoing, Progressing     Problem: Diabetes Comorbidity  Goal: Blood Glucose Level Within Targeted Range  Outcome: Ongoing, Progressing

## 2024-01-04 NOTE — CARE UPDATE
JACQUELINE. Patient off floor in EP lab during rounds.     T1DM.  ESRD.  AICD infection.  BG at and above goal.  Will continue on same regimen for now, and adjust cautiously given ESRD.      Plan:   - Levemir (Insulin Detemir) 16 units BID (Pt T1DM, please still give while NPO)  - Novolog (Insulin Aspart) 6 units  (Hold while NPO)  - Continue LDC (150/50) prn ac/hs for hyperglycemia given poor renal function   - BG checks AC/HS  - Hypoglycemia protocol in place     ** Please notify Endocrine for any change and/or advance in diet**  ** Please call Endocrine for any BG related issues **     Discharge Planning:   TBD. Please notify endocrinology prior to discharge.    Lab Results   Component Value Date    HGBA1C 8.0 (H) 12/19/2023

## 2024-01-04 NOTE — PROGRESS NOTES
[facility] - [facility]iology Stepdown  Nephrology  Progress Note    Patient Name: Ellen Roman  MRN: [id number]  Admission Date: 12/28/2023  Hospital Length of Stay: 7 days  Attending Provider: Aileen Wade MD   Primary Care Physician: Paco Amanda MD  Principal Problem:Endocarditis    Subjective:     HPI: Ellen Roman is a 59 yo female with hx of DM1, HTN, HLD, CAD s/p PCI with AICD placement, Mitral Regurgitation s/p clip, and ESRD on iHD MWF who presents as a transfer from OSH for higher level of care.  According to records, patient initially presented to OSH on 12/18 with chief complaint of R sided chest pain associated with N/V x 2 days with low grade temp.  Cardiology evaluated there and performed a LCH on 12/20 but no PCI was performed and they recommended medical management for her CAD.  Her BC grew MRSA bacteremia that remained positive during the admission with source suspected to be from her RIJ Tunneled dialysis catheter. The TDC was removed and IR was consulted for tempoary line placement and she was found to have a thrombosed LIJ from her previous catheter and a temporary catheter had to be placed to the R femoral vein.  Her BC continued to remain positive despite treatment with Vancomycin and a JAVY was ordered with findings of vegetations to the AICD lead wire and she was then transferred to [facility] [place] for higher level of care.      Interval History: Patient seen this morning, on HD. No overnight events.     Review of patient's allergies indicates:  No Known Allergies  Current Facility-Administered Medications   Medication Frequency    0.9%  NaCl infusion PRN    0.9%  NaCl infusion Once    acetaminophen tablet 650 mg Q6H PRN    albuterol sulfate nebulizer solution 2.5 mg Q4H    albuterol-ipratropium 2.5 mg-0.5 mg/3 mL nebulizer solution 3 mL Q4H PRN    aspirin chewable tablet 81 mg Daily    atorvastatin tablet 40 mg Daily    benzonatate capsule 200 mg Q8H PRN    calcium acetate(phosphat bind) capsule 667  mg TID WM    ceftaroline fosamiL (TEFLARO) 200 mg in dextrose 5 % (D5W) 50 mL IVPB TID    DAPTOmycin (CUBICIN) 785 mg in sodium chloride 0.9% SolP 50 mL IVPB Q48H    dextrose 50 % in water (D50W) injection 50 mL PRN    docusate sodium capsule 50 mg Daily    epoetin john injection 7,900 Units Every Tues, Thurs, Sat    furosemide tablet 80 mg Daily    glucagon (human recombinant) injection 1 mg PRN    glucose chewable tablet 16 g PRN    glucose chewable tablet 24 g PRN    heparin (porcine) injection 1,000 Units PRN    heparin (porcine) injection 3,000 Units Daily PRN    heparin 25,000 units in dextrose 5% (100 units/ml) IV bolus from bag - ADDITIONAL PRN BOLUS - 30 units/kg PRN    heparin 25,000 units in dextrose 5% (100 units/ml) IV bolus from bag - ADDITIONAL PRN BOLUS - 60 units/kg PRN    heparin 25,000 units in dextrose 5% (100 units/ml) IV bolus from bag INITIAL BOLUS Once    hydrALAZINE injection 10 mg Q8H PRN    hydrALAZINE tablet 25 mg Q8H    insulin aspart U-100 pen 0-5 Units AC + HS + 0200 PRN    insulin aspart U-100 pen 6 Units TIDWM    insulin detemir U-100 (Levemir) pen 16 Units BID    melatonin tablet 3 mg Nightly PRN    metoprolol succinate (TOPROL-XL) 24 hr tablet 50 mg Daily    mupirocin 2 % ointment BID    ondansetron injection 4 mg Q8H PRN    pantoprazole EC tablet 40 mg Daily    polyethylene glycol packet 17 g TID    pregabalin capsule 50 mg Daily    prochlorperazine injection Soln 2.5 mg Q4H PRN    promethazine tablet 25 mg Q6H PRN    sacubitriL-valsartan 24-26 mg per tablet 1 tablet BID    senna tablet 8.6 mg Daily    sodium chloride 0.9% bolus 250 mL 250 mL PRN    sodium chloride 0.9% flush 10 mL PRN    vancomycin (VANCOCIN) 4 mg, heparin (porcine) 5,000 Units IV catheter lock (total volume 2 mL) Daily     Facility-Administered Medications Ordered in Other Encounters   Medication Frequency    0.9%  NaCl infusion (for blood administration) Q24H PRN    0.9%  NaCl infusion (for blood  administration) Q24H PRN    benzocaine 20 % oral spray QID PRN    sodium chloride 0.9% flush 10 mL PRN       Objective:     Vital Signs (Most Recent):  Temp: 98.2 °F (36.8 °C) (01/04/24 0727)  Pulse: 88 (01/04/24 0745)  Resp: 18 (01/04/24 0727)  BP: 112/60 (01/04/24 0745)  SpO2: 99 % (01/04/24 0727) Vital Signs (24h Range):  Temp:  [97 °F (36.1 °C)-98.5 °F (36.9 °C)] 98.2 °F (36.8 °C)  Pulse:  [] 88  Resp:  [17-20] 18  SpO2:  [94 %-100 %] 99 %  BP: ()/(51-69) 112/60     Weight: 78.4 kg (172 lb 13.5 oz) (01/04/24 0436)  Body mass index is 27.07 kg/m².  Body surface area is 1.93 meters squared.    I/O last 3 completed shifts:  In: 1040 [P.O.:1040]  Out: -     Physical Exam  Constitutional:       General: She is not in acute distress.     Appearance: Normal appearance. She is not ill-appearing or toxic-appearing.   HENT:      Head: Atraumatic.      Right Ear: External ear normal.      Left Ear: External ear normal.      Nose: Nose normal.      Mouth/Throat:      Mouth: Mucous membranes are moist.   Eyes:      Extraocular Movements: Extraocular movements intact.   Cardiovascular:      Rate and Rhythm: Normal rate and regular rhythm.      Pulses: Normal pulses.      Heart sounds: Normal heart sounds.   Pulmonary:      Effort: Pulmonary effort is normal.      Breath sounds: Normal breath sounds.   Abdominal:      General: Abdomen is flat.      Palpations: Abdomen is soft.   Musculoskeletal:         General: Normal range of motion.      Right lower leg: No edema.      Left lower leg: No edema.   Skin:     General: Skin is warm.      Capillary Refill: Capillary refill takes less than 2 seconds.   Neurological:      Mental Status: She is alert and oriented to person, place, and time.          Significant Labs:  CBC:   Recent Labs   Lab 01/03/24  0857   WBC 10.31   RBC 2.76*   HGB 8.2*   HCT 25.0*      MCV 91   MCH 29.7   MCHC 32.8       CMP:   Recent Labs   Lab 01/03/24  0857   *   CALCIUM 8.7    ALBUMIN 2.4*   PROT 6.7   *   K 4.5   CO2 24   CL 97   BUN 20   CREATININE 6.1*   ALKPHOS 59   ALT 9*   AST 11   BILITOT 0.4       All labs within the past 24 hours have been reviewed.     Significant Imaging:    Assessment/Plan:     Renal/  Chronic kidney disease-mineral and bone disorder  -low phos diet  -continue calcium acetate    ESRD (end stage renal disease)  ESRD on iHD MWF  [facility]  4 hour treatments  EDW of 73 kg    Temporary line to R femoral, reported that patient has thrombosed RIJ where TDC was previously.  AICD to the L.    Plan/Recommendations:  -Undergoing HD now.   -Defer tunneled dialysis catheter placement at this time, will need prior to discharge home.  Likely will need to be placed to the femoral area.  -discussed with patient, will need to work on long term dialysis access (AVF vs AVG) as OP, reports that she was trying to get arranged for peritoneal dialysis.    -renal diet when advanced, 1.5L fluid restrictions  -will continue following for HD needs while IP.    ID  MRSA bacteremia  -MRSA Bacteremia  -RIJ TDC removed @ OSH, BC remained positive  -JAVY performed, vegetations to AICD lead.      Oncology  Anemia due to stage 5 chronic kidney disease, not on chronic dialysis  Epo with HD  -defer iron therapy with bacteremia        Thank you for your consult. I will follow-up with patient. Please contact us if you have any additional questions.    Obdulio Cole MD  Nephrology  [facility] - Cardiology Stepdown

## 2024-01-04 NOTE — ASSESSMENT & PLAN NOTE
#CRT-D in place  #Infection associated with cardiac device  58yoF with T1DM, ESRD on HD, Cad s/p PCI, iCM with prior EF 25-30% recovered to 55-60% with CRT-D in place (dual-coiled 2018 [employer] CRT-D), severe MR s/p MitraClip, and chronic thrombocytopenia is here as a transfer with bacteremia, vegetation involving her RV lead per outside JAVY. Unable to get good visualization with current imaging. Discussed with echo staff regarding need for further imaging lead extraction.     TTE with 0.7cmx1.8cm vegetation without evidence of TV involvement  JAVY agreed, no TV involvement. Measurement of vegetation on JAVY 0.7 x 2.6 cm  Bcx MRSA +  HD per groin trialysis (R Fem)  Formal interrogation showed she is not device-dependent    Recommendations:  - CRT-D extraction today  - Not pacer dependent, will not need temporary pacing

## 2024-01-04 NOTE — ASSESSMENT & PLAN NOTE
CIED (pacemaker lead vegetation on JAVY) from persistent MRSA bacteremia on salvage therapy, bacteremia present for > two weeks, cx persistently positive from 12/18 onwards. Transferred from OSH to [facility] for evaluation for extraction.  Blood cultures on 12/8 with C perfringes and MRSA. Repeat blood cultures  from admission 12/18-30 with MRSA. Patient is afebrile, without leukocytosis and stable hemodynamics. Most recent Bcx 01/01/24 so far with no growth, may be headed towards clearance. Possible extraction with EP planned 1/04.     Recommendations  Continue daptomycin and Ceftaroline for salvage therapy.   Follow up repeat blood cultures, check CPK weekly  Follow up cultures from extraction planned.   At least six weeks of therapy from source control and microbiological clearance. Removal of femoral line will help if she does not clear bacteremia post lead extraction

## 2024-01-04 NOTE — PROGRESS NOTES
[facility] - [facility]iology  Infectious Disease  Progress Note    Patient Name: Ellen Roman  MRN: [id number]  Admission Date: 12/28/2023  Length of Stay: 7 days  Attending Physician: Aileen Wade MD  Primary Care Provider: Paco Amanda MD    Isolation Status: Contact  Assessment/Plan:      ID  * Infection associated with cardiac device  CIED (pacemaker lead vegetation on JAVY) from persistent MRSA bacteremia on salvage therapy, bacteremia present for > two weeks, cx persistently positive from 12/18 onwards. Transferred from OSH to [facility] for evaluation for extraction.  Blood cultures on 12/8 with C perfringes and MRSA. Repeat blood cultures  from admission 12/18-30 with MRSA. Patient is afebrile, without leukocytosis and stable hemodynamics. Most recent Bcx 01/01/24 so far with no growth, may be headed towards clearance. Extraction with EP planned 1/04.     Recommendations  Continue daptomycin and Ceftaroline for salvage therapy.   Follow up repeat blood cultures, check CPK weekly, follow up cultures from extraction planned.   She may be a candidate for chronic suppressive therapy later if remnant pieces of lead are left but device extraction will still help significantly with improving her odds of clinical resolution and lowering chances for recurrence.   At least six weeks of therapy from source control and microbiological clearance (appears to have cleared 1/01).           Anticipated Disposition: TBD    Thank you for your consult. I will follow-up with patient. Please contact us if you have any additional questions.    Bishop Powell MD  Infectious Disease  [facility] - Cardiology    Subjective:     Principal Problem:Infection associated with cardiac device    HPI: A 58-year-old woman with DM1, diabetic gastroparesis, diabetic retinopathy, diabetic nephropathy, ESRD on HD (MWF), CAD, HTN, s/p AICD, s/p mitral clip due to severe MR, chronic thrombocytopenia, and recent C tropicalis fungemia with positive HD  "catheter for K pneumoniae (October) who was transferred from [facility] for further management in the setting of refractory MRSA bacteremia and ACID lead vegetation.     Of note, Mrs. Roman was seen in [facility] ED on 12/18 at which time samples were collected for culture and she was subsequently discharged Blood cultures collected on 12/8 became positive for MRSA and C perfringens. She returned to [facility] on 12/18 at which time she was admitted for further management. Cultures collected from 12/18-12/27 are positive for MRSA. During her admission at [facility] she was diagnosed and treated for left IJ DVT, LUE cellulitis, DM1. Work up also revealed evidence of a large vegetation attached to the AICD lead across the TV.     Infectious Diseases consulted for "Bacteremia with concern for ICD lead infection. Transferred on daptomycin and cefepime. "    Interval History: TAYLOR. No new joint pain or back pain.    Review of Systems  Constitutional:  Negative for fever.   HENT:  Negative for trouble swallowing.    Respiratory:  Negative for cough.    Cardiovascular:  Negative for chest pain.   Gastrointestinal:  Negative for abdominal pain, diarrhea and vomiting.   Musculoskeletal:  Negative for arthralgias and back pain.   Psychiatric/Behavioral:  Negative for confusion.    Objective:     Vital Signs (Most Recent):  Temp: 98.5 °F (36.9 °C) (01/04/24 1142)  Pulse: 86 (01/04/24 1143)  Resp: 18 (01/04/24 1143)  BP: 133/72 (01/04/24 1142)  SpO2: 98 % (01/04/24 1143) Vital Signs (24h Range):  Temp:  [97 °F (36.1 °C)-98.5 °F (36.9 °C)] 98.5 °F (36.9 °C)  Pulse:  [77-99] 86  Resp:  [16-20] 18  SpO2:  [96 %-100 %] 98 %  BP: ()/(51-72) 133/72     Weight: 78.4 kg (172 lb 13.5 oz)  Body mass index is 27.07 kg/m².    Estimated Creatinine Clearance: 31.9 mL/min (A) (based on SCr of 2.1 mg/dL (H)).     Physical Exam    Constitutional:       Appearance: Normal appearance. She is not ill-appearing or toxic-appearing.   HENT:      " Head: Normocephalic and atraumatic.      Nose: Nose normal.      Mouth/Throat:      Mouth: Mucous membranes are moist.      Pharynx: Oropharynx is clear.   Eyes:      Conjunctiva/sclera: Conjunctivae normal.   Cardiovascular:      Rate and Rhythm: Normal rate and regular rhythm.      Pulses: Normal pulses.      Heart sounds: Normal heart sounds.   Pulmonary:      Effort: Pulmonary effort is normal.      Breath sounds: Normal breath sounds.   Abdominal:      General: Bowel sounds are normal.      Palpations: Abdomen is soft.   Musculoskeletal:         General: No swelling or deformity.      Cervical back: Neck supple.   Skin:     General: Skin is warm and dry.      Comments: R femoral HD line site c/d/i   Neurological:      Mental Status: She is alert and oriented to person, place, and time. Mental status is at baseline.   Psychiatric:         Behavior: Behavior normal.         Thought Content: Thought content normal.   Significant Labs:   Microbiology Results (last 7 days)       Procedure Component Value Units Date/Time    Blood culture [[id number]] Collected: 01/01/24 0856    Order Status: Completed Specimen: Blood from Antecubital, Right Updated: 01/04/24 1212     Blood Culture, Routine No Growth to date      No Growth to date      No Growth to date      No Growth to date    Blood culture [[id number]] Collected: 01/01/24 0856    Order Status: Completed Specimen: Blood Updated: 01/04/24 1212     Blood Culture, Routine No Growth to date      No Growth to date      No Growth to date      No Growth to date    Blood culture [[id number]]     Order Status: Canceled Specimen: Blood     Blood culture [[id number]]     Order Status: Canceled Specimen: Blood     Blood culture [[id number]]  (Abnormal) Collected: 12/30/23 7109    Order Status: Completed Specimen: Blood Updated: 01/03/24 0950     Blood Culture, Routine Gram stain marcella bottle: Gram positive cocci in clusters resembling Staph      Positive results previously called  12/31/2023  22:59      Gram stain aer bottle: Gram positive cocci in clusters resembling Staph      METHICILLIN RESISTANT STAPHYLOCOCCUS AUREUS  ID consult required at [facility].  For susceptibility see order #[id number]      Blood culture [[id number]]  (Abnormal) Collected: 12/30/23 1741    Order Status: Completed Specimen: Blood Updated: 01/03/24 0949     Blood Culture, Routine Gram stain aer bottle: Gram positive cocci in clusters resembling Staph      Positive results previously called 01/01/2024  01:12      Gram stain marcella bottle: Gram positive cocci in clusters resembling Staph      Positive results previously called 01/01/2024  09:19      METHICILLIN RESISTANT STAPHYLOCOCCUS AUREUS  ID consult required at [facility].  For susceptibility see order #[id number]      Blood culture [[id number]]  (Abnormal)  (Susceptibility) Collected: 12/29/23 0004    Order Status: Completed Specimen: Blood Updated: 01/03/24 0845     Blood Culture, Routine Gram stain aer bottle: Gram positive cocci in clusters resembling Staph      Results called to and read back by: Mary Lopez RN 12/30/2023  09:26      METHICILLIN RESISTANT STAPHYLOCOCCUS AUREUS  ID consult required at [facility].      Blood culture [[id number]]  (Abnormal) Collected: 12/29/23 0004    Order Status: Completed Specimen: Blood Updated: 01/01/24 0831     Blood Culture, Routine Gram stain aer bottle: Gram positive cocci in clusters resembling Staph      Positive results previously called 12/30/2023      METHICILLIN RESISTANT STAPHYLOCOCCUS AUREUS  ID consult required at [facility].  For susceptibility see order # [id number]      MRSA/SA Rapid ID by PCR from Blood culture [[id number]]  (Abnormal) Collected: 12/29/23 0004    Order Status: Completed Updated: 12/30/23 1037     Staph aureus ID by PCR Positive     Methicillin Resistant ID by PCR  Positive          All pertinent labs within the past 24 hours have been reviewed.  Recent Lab Results  (Last 5 results in the past 24 hours)        01/04/24  1139   01/04/24  1040   01/04/24  0721   01/04/24  0719   01/04/24  0522        Albumin   2.4             Anion Gap   8             aPTT         35.4  Comment: Refer to local heparin nomogram for intensity/dose specific   therapeutic   range.         BUN   9             Calcium   8.5             Chloride   103             CO2   23             Creatinine   2.1             eGFR   27.0             Glucose   105             Phosphorus Level   1.8             POCT Glucose 121     213   205         Potassium   3.1             Sodium   134                                    Significant Imaging: I have reviewed all pertinent imaging results/findings within the past 24 hours.

## 2024-01-04 NOTE — ANESTHESIA PROCEDURE NOTES
Arterial    Diagnosis: endocarditis    Patient location during procedure: done in OR    Staffing  Authorizing Provider: Evangelista Sargent MD  Performing Provider: Evangelista Sargent MD    Staffing  Performed by: Evangelista Sargent MD  Authorized by: Evangelista Sargent MD    Anesthesiologist was present at the time of the procedure.  Arterial  Skin Prep: chlorhexidine gluconate  Local Infiltration: none  Orientation: right  Location: radial    Catheter Size: 20 G  Catheter placement by Ultrasound guidance. Heme positive aspiration all ports.   Vessel Caliber: patent  Needle advanced into vessel with real time Ultrasound guidance.Insertion Attempts: 1  Assessment  Dressing: secured with tape and tegaderm

## 2024-01-04 NOTE — SUBJECTIVE & OBJECTIVE
"Interval HPI:   Overnight events: NAEON. Remains in CSU. POD 6. BG  at or slightly above goal ranges on current SQ insulin regimen.   Eatin%  Nausea: No  Hypoglycemia and intervention: No  Fever: No  TPN and/or TF: No  If yes, type of TF/TPN and rate: n/a    BP (!) 109/56   Pulse 79   Temp 98.2 °F (36.8 °C) (Oral)   Resp 18   Ht 5' 7" (1.702 m)   Wt 78.4 kg (172 lb 13.5 oz)   LMP  (LMP Unknown)   SpO2 99%   BMI 27.07 kg/m²     Labs Reviewed and Include    No results for input(s): "GLU", "CALCIUM", "ALBUMIN", "PROT", "NA", "K", "CO2", "CL", "BUN", "CREATININE", "ALKPHOS", "ALT", "AST", "BILITOT" in the last 24 hours.  Lab Results   Component Value Date    WBC 10.31 2024    HGB 8.2 (L) 2024    HCT 25.0 (L) 2024    MCV 91 2024     2024     No results for input(s): "TSH", "FREET4" in the last 168 hours.  Lab Results   Component Value Date    HGBA1C 8.0 (H) 2023       Nutritional status:   Body mass index is 27.07 kg/m².  Lab Results   Component Value Date    ALBUMIN 2.4 (L) 2024    ALBUMIN 2.2 (L) 2024    ALBUMIN 2.2 (L) 2024     No results found for: "PREALBUMIN"    Estimated Creatinine Clearance: 11 mL/min (A) (based on SCr of 6.1 mg/dL (H)).    Accu-Checks  Recent Labs     24  1351 24  1602 24  2002 24  0810 24  1013 24  1304 24  1642 24  2050 24  0719 24  0721   POCTGLUCOSE 246* 252* 159* 289* 312* 267* 217* 114* 205* 213*       Current Medications and/or Treatments Impacting Glycemic Control  Immunotherapy:    Immunosuppressants       None          Steroids:   Hormones (From admission, onward)      Start     Stop Route Frequency Ordered    23 0020  melatonin tablet 3 mg         -- Oral Nightly PRN 23 2331          Pressors:    Autonomic Drugs (From admission, onward)      None          Hyperglycemia/Diabetes Medications:   Antihyperglycemics (From admission, onward) "      Start     Stop Route Frequency Ordered    01/01/24 2100  insulin detemir U-100 (Levemir) pen 16 Units         -- SubQ 2 times daily 01/01/24 1223    12/31/23 1200  insulin aspart U-100 pen 0-5 Units         -- SubQ Before meals, nightly and at 0200 PRN 12/31/23 1151    12/31/23 0745  insulin aspart U-100 pen 6 Units         -- SubQ 3 times daily with meals 12/31/23 0739

## 2024-01-04 NOTE — CONSULTS
[facility], [address]  JAVY Consult      Ellen Roman  YOB: 1965  Medical Record Number:  [id number]  Attending Physician:  Aileen Wade MD   Date of Admission: 12/28/2023       Hospital Day:  7  Current Principal Problem:  Endocarditis      History     Cc: endocarditis    Ellen Roman is 58 year old female with PMHx significant for T1DM c/b gastroparesis, retinopathy and nephropathy - ESRD on HD, HTN, HLD, CAD s/p prior PCI with ischemic cardiomyopathy s/p AICD placement, chronic thrombocytopenia, severe mitral regurgitation status post MitraClip, history of fungemia/bacteremia initially presenting to [facility] ER on 12/18 with a complaints of right-sided chest pain associated with nausea and vomiting multiple episodes for the prior 2 days.      She also reported low-grade temperature. Patient reports generalized weakness. Cardiology performed LHC on 12/20/2023 for evaluation of this chest pain with recommendation for medical management for coronary artery disease. Course is then complicated by refractory MRSA bacteremia. HM team initially suspected source of bacteremia was a tunneled dialysis catheter. This was removed upon admission and patient had placement of left IJ. The placement of left IJ trialysis catheter was c/b IJ thrombosis for which she is started on provoked DVT management - currently with lovenox though with ESRD would consider transition to heparin. IR had difficulty placing the right IJ due to thrombosis as well and access subsequently was placed on the right femoral. Due to persistent bacteremia despite vancomycin, patient subsequently had JAVY 12/27/23 with findings reported as positive for vegetations present on the ICD lead wire. As per OS cardiologist, requested transfer from [facility] to [facility]. 2018 CRT-D [employer].     Dysphagia or odynophagia:  no  Liver Disease, esophageal disease, or known varices:  no  Upper GI Bleeding: no  Snoring:  no  Sleep Apnea:   no  Prior neck surgery or radiation:  no  History of anesthetic difficulties:  no  Family history of anesthetic difficulties:  no  Last oral intake: last pm   Able to move neck in all directions: yes        Medications - Outpatient  Prior to Admission medications    Medication Sig Start Date End Date Taking? Authorizing Provider   alirocumab (PRALUENT PEN) 150 mg/mL PnIj Inject 1 mL (150 mg total) into the skin every 14 (fourteen) days. 11/3/22  Yes    atorvastatin (LIPITOR) 40 MG tablet Take 1 tablet by mouth nightly. 9/5/23  Yes Paco Amanda MD   calcitRIOL (ROCALTROL) 0.25 MCG Cap Take 1 capsule by mouth once daily. 4/20/22  Yes Zaire Couch MD   cloNIDine (CATAPRES) 0.3 MG tablet Take 1 tablet by mouth 2 (two) times daily. 11/3/22  Yes    clopidogreL (PLAVIX) 75 mg tablet Take 1 tablet by mouth once daily. 10/3/23  Yes Paco Amanda MD   diphenhydrAMINE (BENADRYL) 25 mg capsule Take 1 capsule (25 mg total) by mouth nightly as needed for Itching. 1/19/23  Yes Paco Amanda MD   doxazosin (CARDURA) 8 MG Tab Take 1 tablet by mouth nightly. 8/29/23  Yes Paco Amanda MD   ENTRESTO  mg per tablet TAKE 1 TABLET(S) BY MOUTH TWO TIMES A DAY 8/21/23  Yes Paco Amanda MD   famotidine (PEPCID) 20 MG tablet Take 1 tablet by mouth once daily. 11/3/22  Yes    furosemide (LASIX) 20 MG tablet Take 1 tablet by mouth once daily. 2/21/23  Yes Paco Amanda MD   hydrALAZINE (APRESOLINE) 50 MG tablet Take 1 tablet by mouth 2 (two) times a day (morning and before bed). 9/5/23  Yes Paco Amanda MD   metoprolol succinate (TOPROL-XL) 100 MG 24 hr tablet Take 1 tablet by mouth once daily. 11/3/22  Yes    metroNIDAZOLE (FLAGYL) 500 MG tablet Take 1 tablet in the morning and 1 tablet before bedtime for 7 days. 7/13/23  Yes    NIFEdipine (PROCARDIA-XL) 60 MG (OSM) 24 hr tablet Take 1 tablet by mouth once daily. 10/3/23  Yes Paco Amanda MD    ondansetron (ZOFRAN-ODT) 4 MG TbDL Take 1 tablet by mouth. 10/20/22  Yes Provider, Historical   ondansetron (ZOFRAN-ODT) 4 MG TbDL Take 1 tablet by mouth every 8 (eight) hours as needed for up to 7 days. 7/13/23  Yes    pantoprazole (PROTONIX) 40 MG tablet Take 1 tablet by mouth once daily. 11/3/22  Yes    prochlorperazine (COMPAZINE) 10 MG tablet Take 1 tablet by mouth every 6 (six) hours as needed for up to 7 days. 1/24/23  Yes    promethazine (PHENERGAN) 25 MG tablet Take 1 tablet by mouth 4 (four) times daily as needed for nausea for up to 7 days 6/14/22  Yes    thiamine (VITAMIN B-1) 100 MG tablet Take 1 tablet (100 mg total) by mouth once daily. 9/5/23  Yes Paco Amanda MD   vancomycin (VANCOCIN) 125 MG capsule Take 1 capsule by mouth in the morning and 1capsule by mouth at noon and 1 capsule by mouth in the evening and 1 capsule by mouth before bedtime for 10 days. 11/3/23  Yes    amoxicillin-clavulanate 500-125mg (AUGMENTIN) 500-125 mg Tab Take 1 tablet by mouth every morning for 2 days 11/4/23      blood sugar diagnostic (TRUE METRIX GLUCOSE TEST STRIP MISC) True Metrix Glucose Test Strip   USE TO TEST BLOOD SUGAR 3 TIMES DAILY    Provider, Historical   blood sugar diagnostic Strp Use to test blood sugar 3 (three) times daily before meals. 1/19/23   Paco Amanda MD   blood-glucose meter Misc Use as directed 6/26/20   Jamey Solis MD   blood-glucose meter Misc Use to test blood sugar as directed 1/19/23 1/19/24  Paco Amanda MD   blood-glucose sensor (DEXCOM G7 SENSOR) Gini Apply one to skin as directed and change q 10 days. 9/26/23   Elsa Paz APRN   bumetanide (BUMEX) 2 MG tablet Take 1 tablet by mouth once daily. 5/26/22 8/29/23  Jonny Anthony MD   calcitRIOL (ROCALTROL) 0.25 MCG Cap Take 1 capsule by mouth once daily. 11/3/22      calcium acetate,phosphat bind, (PHOSLO) 667 mg tablet Take 1 tablet by mouth in the morning and 1 tablet at noon and 1  "tablet in the evening with meals. 9/5/23   Pcao Amanda MD   epoetin john (PROCRIT) 20,000 unit/mL injection Inject 1 ml under the skin every two weeks. Administer only if hemoglobin is <10.5. 8/16/22      insulin glargine 100 units/mL SubQ pen Inject 30 Units into the skin nightly. 9/5/23   Paco Amanda MD   insulin lispro 100 unit/mL injection Inject 5 Units into the skin 3 (three) times daily with meals. Discard vial 28 days after opening. 4/17/23      insulin lispro 100 unit/mL injection Inject 0-20 Units into the skin 4 (four) times daily before meals and nightly. If BS : 0 units, -200: 4 units, -250: 8 units; -300: 12 units, -350: 16 units, -400: 20 units, BS > 400: 20 units and call your doctor 9/5/23   Paco Amanda MD   insulin lispro protamin-lispro (HUMALOG MIX 50-50 KWIKPEN) 100 unit/mL (50-50) InPn Inject 10 Units into the skin 3 (three) times daily before meals. 12/1/22   Paco Amanda MD   metoclopramide HCl (REGLAN) 10 MG tablet Take 1 tablet by mouth 4 (four) times daily before meals and nightly. 8/11/23      nitroGLYCERIN (NITROSTAT) 0.4 MG SL tablet Place 1 tablet under the tongue every 5 (five) minutes as needed for chest pain.  Max of 3 tablets in a 15 minute period. 12/2/21 8/29/23  Lita Beck MD   venlafaxine (EFFEXOR-XR) 37.5 MG 24 hr capsule Take 1 capsule by mouth daily 12/2/21 8/29/23  Lita Beck MD   aspirin 81 MG Chew Take 81 mg by mouth. 6/1/22 8/18/22  Provider, Historical   folic acid (FOLVITE) 1 MG tablet Take 5 tablets by mouth daily for 10 days, THEN take 1 tablet daily for 80 days 7/28/23 11/3/23     insulin syringe-needle U-100 1 mL 31 gauge x 15/64" Syrg Inject 1 Syringe into the skin once daily. 12/2/21 8/18/22  Lita Beck MD   lancets (ONETOUCH ULTRASOFT LANCETS) Misc Use 1 lancet to check blood sugar 4 (four) times daily. 2/2/22 8/18/22  Smith Toro MD   losartan (COZAAR) 100 " MG tablet Take 1 tablet  by mouth nightly for blood pressure. 11/2/22 11/4/22           Medications - Current  Scheduled Meds:   sodium chloride 0.9%   Intravenous Once    albuterol sulfate  2.5 mg Nebulization Q4H    aspirin  81 mg Oral Daily    atorvastatin  40 mg Oral Daily    calcium acetate(phosphat bind)  667 mg Oral TID WM    ceftaroline (TEFLARO) IVPB  200 mg Intravenous TID    DAPTOmycin (CUBICIN) IV (PEDS and ADULTS)  10 mg/kg Intravenous Q48H    docusate sodium  50 mg Oral Daily    epoetin john  100 Units/kg Intravenous Every Tues, Thurs, Sat    furosemide  80 mg Oral Daily    heparin (PORCINE)  80 Units/kg (Adjusted) Intravenous Once    insulin aspart U-100  6 Units Subcutaneous TIDWM    insulin detemir U-100  16 Units Subcutaneous BID    metoprolol succinate  50 mg Oral Daily    mupirocin   Nasal BID    pantoprazole  40 mg Oral Daily    polyethylene glycol  17 g Oral TID    pregabalin  50 mg Oral Daily    sacubitriL-valsartan  1 tablet Oral BID    senna  8.6 mg Oral Daily    vancomycin (VANCOCIN) 4 mg, heparin (porcine) 5,000 Units IV catheter lock (total volume 2 mL)   Intra-Catheter Daily     Continuous Infusions:  PRN Meds:.sodium chloride 0.9%, acetaminophen, albuterol-ipratropium, benzonatate, dextrose 50 % in water (D50W), glucagon (human recombinant), glucose, glucose, heparin (porcine), heparin (porcine), heparin (PORCINE), heparin (PORCINE), hydrALAZINE, insulin aspart U-100, melatonin, ondansetron, prochlorperazine, promethazine, sodium chloride 0.9%, sodium chloride 0.9%      Allergies  Review of patient's allergies indicates:  No Known Allergies      Past Medical History  Past Medical History:   Diagnosis Date    Acute hepatitis A     Acute kidney injury 03/07/2018    Acute respiratory failure with hypoxemia 03/13/2020    Arthritis     Cataract     No date or laterality given    Chronic systolic CHF (congestive heart failure), NYHA class 3     Coronary artery disease     Diabetes mellitus      Dialysis patient     Dilated cardiomyopathy     Diverticulosis of intestine 10/02/2013    Gastroparesis 02/24/2014    Hypertension     ICD (implantable cardioverter-defibrillator), biventricular, in situ     Ischemic cardiomyopathy 04/29/2018    Left bundle branch block 04/29/2018    Microcytic hypochromic anemia 03/07/2018    Migraine 01/24/2016    Presence of drug coated stent in LAD coronary artery     A SHU stent was placed to the mid D#2 in 7-2016, and then into the ostium of the D#2 in 1-2018, and then to the prox-mid D#2 in 3-2017    Presence of drug coated stent in left circumflex coronary artery 01/20/2017    A SHU stent was placed to the proximal OM 1 branch in January of 2017    Severe mitral regurgitation 04/12/2018         Past Surgical History  Past Surgical History:   Procedure Laterality Date    ABLATION OF ARRHYTHMOGENIC FOCUS FOR SUPRAVENTRICULAR TACHYCARDIA WITH ELECTROPHYSIOLOGY STUDY N/A 10/06/2021    Procedure: Ablation SVT;  Surgeon: Joby Gutiérrez MD;  Location: [facility] EP LAB;  Service: Cardiology;  Laterality: N/A;    ANGIOGRAM, CORONARY, WITH LEFT HEART CATHETERIZATION  04/05/2022    Procedure: Angiogram, Coronary, with Left Heart Cath;  Surgeon: Manav Schneider MD;  Location: [facility] CATH LAB;  Service: Cardiology;;    BREAST BIOPSY Left     BREAST SURGERY Left     left biopsy    CARDIAC CATHETERIZATION      CARDIAC DEFIBRILLATOR PLACEMENT      CARDIAC ELECTROPHYSIOLOGY STUDY WITH DRUG CHALLENGE  10/06/2021    Procedure: EP Drug challenge;  Surgeon: Joby Gutiérrez MD;  Location: [facility] EP LAB;  Service: Cardiology;;    CATHETERIZATION OF BOTH LEFT AND RIGHT HEART N/A 12/10/2019    Procedure: CATHETERIZATION, HEART, BOTH LEFT AND RIGHT;  Surgeon: LUIS ALFREDO Ernandez MD;  Location: [facility] CATH LAB;  Service: Cardiology;  Laterality: N/A;    ECHOCARDIOGRAM,TRANSESOPHAGEAL N/A 12/29/2023    Procedure: Transesophageal echo (JAVY) intra-procedure log documentation;  Surgeon: Provider, Dosc Diagnostic;   Location: [facility] EP LAB;  Service: Cardiology;  Laterality: N/A;    TONSILLECTOMY      TRANSESOPHAGEAL ECHOCARDIOGRAPHY  07/11/2016    TRANSESOPHAGEAL ECHOCARDIOGRAPHY N/A 01/10/2022    Procedure: ECHOCARDIOGRAM, TRANSESOPHAGEAL;  Surgeon: Leslie Fishman MD;  Location: [facility] CATH LAB;  Service: Cardiology;  Laterality: N/A;    TRANSESOPHAGEAL ECHOCARDIOGRAPHY N/A 8/17/2022    Procedure: ECHOCARDIOGRAM, TRANSESOPHAGEAL;  Surgeon: Manav Schneider MD;  Location: [facility] CATH LAB;  Service: Cardiology;  Laterality: N/A;    TUBAL LIGATION      VASCULAR SURGERY           Social History  Social History     Socioeconomic History    Marital status:     Number of children: 5   Tobacco Use    Smoking status: Never    Smokeless tobacco: Never   Substance and Sexual Activity    Alcohol use: Not Currently    Drug use: Never    Sexual activity: Yes     Partners: Male   Social History Narrative    She lives with her boy friend at home.      Social Determinants of Health     Financial Resource Strain: Low Risk  (12/29/2023)    Overall Financial Resource Strain (CARDIA)     Difficulty of Paying Living Expenses: Not hard at all   Food Insecurity: No Food Insecurity (12/29/2023)    Hunger Vital Sign     Worried About Running Out of Food in the Last Year: Never true     Ran Out of Food in the Last Year: Never true   Transportation Needs: No Transportation Needs (12/29/2023)    PRAPARE - Transportation     Lack of Transportation (Medical): No     Lack of Transportation (Non-Medical): No   Physical Activity: Insufficiently Active (12/29/2023)    Exercise Vital Sign     Days of Exercise per Week: 3 days     Minutes of Exercise per Session: 20 min   Stress: No Stress Concern Present (12/29/2023)    [employer] [place] [employer] - Occupational Stress Questionnaire     Feeling of Stress : Not at all   Social Connections: Socially Isolated (12/29/2023)    Social Connection and Isolation Panel [NHANES]     Frequency of  Communication with Friends and Family: More than three times a week     Frequency of Social Gatherings with Friends and Family: Once a week     Attends Temple Services: Never     Active Member of Clubs or Organizations: No     Attends Club or Organization Meetings: Never     Marital Status:    Housing Stability: Low Risk  (12/29/2023)    Housing Stability Vital Sign     Unable to Pay for Housing in the Last Year: No     Number of Places Lived in the Last Year: 1     Unstable Housing in the Last Year: No         ROS  10 point ROS performed and negative except as stated in HPI     Physical Examination         Vital Signs  Vitals  Temp: 98.5 °F (36.9 °C)  Temp Source: Oral  Pulse: 77  Heart Rate Source: Monitor  Resp: 17  SpO2: 99 %  Pulse Oximetry Type: Intermittent  Flow (L/min): 3  BP: (!) 117/58  MAP (mmHg): 83  BP Location: Right arm  BP Method: Automatic  Patient Position: Lying          24 Hour VS Range    Temp:  [97 °F (36.1 °C)-98.5 °F (36.9 °C)]   Pulse:  []   Resp:  [17-20]   BP: ()/(51-69)   SpO2:  [94 %-100 %]     Intake/Output Summary (Last 24 hours) at 1/4/2024 0926  Last data filed at 1/3/2024 2300  Gross per 24 hour   Intake 1040 ml   Output --   Net 1040 ml         Physical Exam:   Constitutional: no acute distress  HEENT: NCAT, EOMI, no scleral icterus  Cardiovascular: Regular rate and rhythm  Pulmonary: Normal respiratory effort   Abdomen: nontender, non-distended   Neuro: alert and oriented, no focal deficits  Extremities: warm, no edema   MSK: no deformities  Integument: intact, no rashes       Data       Recent Labs   Lab 12/30/23  0502 12/31/23  0410 01/01/24  0311 01/02/24  0508 01/03/24  0857   WBC 5.01 6.19 6.58 5.91 10.31   HGB 7.8* 7.8* 7.6* 7.6* 8.2*   HCT 24.8* 24.7* 23.9* 22.6* 25.0*   * 149* 163 148* 159        Recent Labs   Lab 12/29/23  0004 12/29/23  0917 01/03/24  0857   INR 1.0 1.0 1.0        Recent Labs   Lab 12/30/23  1834 12/31/23  0414  "01/01/24  0310 01/02/24  0508 01/03/24  0857   * 137 136 133* 134*   K 4.0 4.0 4.7 5.0 4.5   CL 98 99 99 101 97   CO2 22* 24 23 19* 24   BUN 15 18 28* 34* 20   CREATININE 3.8* 4.4* 6.3* 7.2* 6.1*   ANIONGAP 12 14 14 13 13   CALCIUM 8.1* 8.5* 8.4* 8.4* 8.7        Recent Labs   Lab 12/30/23  1834 12/31/23  0410 01/01/24  0310 01/02/24  0508 01/03/24  0857   PROT 6.2 5.9* 6.1 6.2 6.7   ALBUMIN 2.2* 2.1* 2.2* 2.2* 2.4*   BILITOT 0.3 0.3 0.3 0.3 0.4   ALKPHOS 59 53* 59 57 59   AST 10 10 12 10 11   ALT 6* 6* 9* 7* 9*        No results for input(s): "TROPONINI" in the last 168 hours.     BNP   Date Value   12/11/2023 4,227 pg/mL (H)   11/16/2023 3,418 PG/ML (H)   10/07/2023 879 PG/ML (H)   09/14/2023 1,190 PG/ML (H)   08/09/2023 3,439 PG/ML (H)   06/06/2023 4,408 PG/ML (H)       Recent Labs   Lab 12/29/23  0004 12/30/23  1739 12/30/23  1741 01/01/24  0856   LABBLOO Gram stain aer bottle: Gram positive cocci in clusters resembling Staph  Results called to and read back by: Mary Lopez RN 12/30/2023  09:26  METHICILLIN RESISTANT STAPHYLOCOCCUS AUREUS  ID consult required at [facility].  *  Gram stain aer bottle: Gram positive cocci in clusters resembling Staph  Positive results previously called 12/30/2023  METHICILLIN RESISTANT STAPHYLOCOCCUS AUREUS  ID consult required at [facility]  For susceptibility see order # [id number]  * Gram stain marcella bottle: Gram positive cocci in clusters resembling Staph  Positive results previously called 12/31/2023  22:59  Gram stain aer bottle: Gram positive cocci in clusters resembling Staph  METHICILLIN RESISTANT STAPHYLOCOCCUS AUREUS  ID consult required at [facility].  For susceptibility see order #[id number]  * Gram stain aer bottle: Gram positive cocci in clusters resembling Staph  Positive results previously called 01/01/2024  01:12  Gram stain marcella bottle: Gram positive " cocci in clusters resembling Staph  Positive results previously called 01/01/2024  09:19  METHICILLIN RESISTANT STAPHYLOCOCCUS AUREUS  ID consult required at [facility] [facility] locations.  For susceptibility see order #[id number]  * No Growth to date  No Growth to date  No Growth to date  No Growth to date  No Growth to date  No Growth to date            Assessment & Plan     #Pacemaker extraction  #MRSA bacteremia  CRT-D in place  Infection associated with cardiac device  58yoF with T1DM, ESRD on HD, Cad s/p PCI, iCM with prior EF 25-30% recovered to 55-60% with CRT-D in place (dual-coiled 2018 [employer] CRT-D), severe MR s/p MitraClip, and chronic thrombocytopenia is here as a transfer with bacteremia, vegetation involving her RV lead per outside JAVY. Unable to get good visualization with current imaging. Discussed with echo staff regarding need for further imaging lead extraction.      60-65% EF  TTE with 0.7cmx1.8cm vegetation without evidence of TV involvement  JAVY agreed, no TV involvement. Measurement of vegetation on JAVY 0.7 x 2.6 cm  Bcx MRSA +  HD per groin trialysis (R Fem)  Formal interrogation showed she is not device-dependent  CRT-D extraction today    -No absolute contraindications of esophageal stricture, tumor, perforation, laceration,or diverticulum and/or active GI bleed  -The risks, benefits & alternatives of the procedure were explained to the patient.   -The risks of transesophageal echo include but are not limited to:  Dental trauma, esophageal trauma/perforation, bleeding, laryngospasm/brochospasm, aspiration, sore throat/hoarseness, & dislodgement of the endotracheal tube/nasogastric tube (where applicable).    -The risks of moderate sedation include hypotension, respiratory depression, arrhythmias, bronchospasm, & death.    -Informed consent was obtained. The patient is agreeable to proceed with the procedure and all questions and concerns addressed    Henson  Damir Cordero MD  [facility]   Cardiovascular Disease PGY-V

## 2024-01-04 NOTE — ANESTHESIA PROCEDURE NOTES
Intubation    Date/Time: 1/4/2024 2:01 PM    Performed by: Riri Kent CRNA  Authorized by: Evangelista Sargent MD    Intubation:     Induction:  Intravenous    Intubated:  Postinduction    Mask Ventilation:  Easy mask    Attempts:  1    Attempted By:  CRNA    Method of Intubation:  Video laryngoscopy    Blade:  Riggins 3    Laryngeal View Grade: Grade I - full view of cords      Difficult Airway Encountered?: No      Complications:  None    Airway Device:  Oral endotracheal tube    Airway Device Size:  7.0    Style/Cuff Inflation:  Cuffed (inflated to minimal occlusive pressure)    Tube secured:  21    Secured at:  The lips    Placement Verified By:  Capnometry    Complicating Factors:  None    Findings Post-Intubation:  BS equal bilateral and atraumatic/condition of teeth unchanged

## 2024-01-04 NOTE — PLAN OF CARE
Problem: Device-Related Complication Risk (Hemodialysis)  Goal: Safe, Effective Therapy Delivery  Outcome: Ongoing, Progressing     Problem: Hemodynamic Instability (Hemodialysis)  Goal: Effective Tissue Perfusion  Outcome: Ongoing, Progressing     Problem: Infection (Hemodialysis)  Goal: Absence of Infection Signs and Symptoms  Outcome: Ongoing, Progressing       Dialysis tx ended to the right fem trialysis cath, vanc locked, and capped. Sterile dressing changed    Net fluid removed : 2.5 L    Report given to primary nurse, pt transported from [facility] to room 322 by wheelchair.

## 2024-01-04 NOTE — PROGRESS NOTES
[facility] - Cardiology Stepdown  Infectious Disease  Progress Note    Patient Name: Ellen Roman  MRN: [id number]  Admission Date: 12/28/2023  Length of Stay: 6 days  Attending Physician: Yamilet Boyce MD  Primary Care Provider: Paco Amanda MD    Isolation Status: Contact  Assessment/Plan:      ID  Infection associated with cardiac device  CIED (pacemaker lead vegetation on JAVY) from persistent MRSA bacteremia on salvage therapy, bacteremia present for > two weeks, cx persistently positive from 12/18 onwards. Transferred from [facility] to [facility] for evaluation for extraction.  Blood cultures on 12/8 with C perfringes and MRSA. Repeat blood cultures  from admission 12/18-30 with MRSA. Patient is afebrile, without leukocytosis and stable hemodynamics. Most recent Bcx 01/01/24 so far with no growth, may be headed towards clearance. Possible extraction with EP planned 1/04.     Recommendations  Continue daptomycin and Ceftaroline for salvage therapy.   Follow up repeat blood cultures, check CPK weekly  Follow up cultures from extraction planned.   At least six weeks of therapy from source control and microbiological clearance. Removal of femoral line will help if she does not clear bacteremia post lead extraction       MRSA bacteremia  Secondary to AICD infection.   Management as in AICD infection.  Pending EP management.         Anticipated Disposition: TBD    Thank you for your consult. I will follow-up with patient. Please contact us if you have any additional questions.    Bishop Powell MD  Infectious Disease  [facility] - Cardiology Stepdown    Subjective:     Principal Problem:Endocarditis    HPI: A 58-year-old woman with DM1, diabetic gastroparesis, diabetic retinopathy, diabetic nephropathy, ESRD on HD (MW), CAD, HTN, s/p AICD, s/p mitral clip due to severe MR, chronic thrombocytopenia, and recent C tropicalis fungemia with positive HD catheter for K pneumoniae (October) who was transferred from   "[facility] for further management in the setting of refractory MRSA bacteremia and ACID lead vegetation.     Of note, Mrs. Roman was seen in [facility] ED on 12/18 at which time samples were collected for culture and she was subsequently discharged Blood cultures collected on 12/8 became positive for MRSA and C perfringens. She returned to [facility] on 12/18 at which time she was admitted for further management. Cultures collected from 12/18-12/27 are positive for MRSA. During her admission at [facility] she was diagnosed and treated for left IJ DVT, LUE cellulitis, DM1. Work up also revealed evidence of a large vegetation attached to the AICD lead across the TV.     Infectious Diseases consulted for "Bacteremia with concern for ICD lead infection. Transferred on daptomycin and cefepime. "    Interval History: TAYLOR    Review of Systems  Constitutional:  Negative for fever.   HENT:  Negative for trouble swallowing.    Respiratory:  Negative for cough.    Cardiovascular:  Negative for chest pain.   Gastrointestinal:  Negative for abdominal pain, diarrhea and vomiting.   Musculoskeletal:  Negative for arthralgias and back pain.   Psychiatric/Behavioral:  Negative for confusion.    Objective:     Vital Signs (Most Recent):  Temp: 97 °F (36.1 °C) (01/03/24 1509)  Pulse: 90 (01/03/24 1811)  Resp: 17 (01/03/24 1509)  BP: (!) 116/59 (01/03/24 1811)  SpO2: 96 % (01/03/24 1509) Vital Signs (24h Range):  Temp:  [97 °F (36.1 °C)-100.3 °F (37.9 °C)] 97 °F (36.1 °C)  Pulse:  [] 90  Resp:  [16-20] 17  SpO2:  [91 %-98 %] 96 %  BP: ()/(57-78) 116/59     Weight: 78.5 kg (173 lb)  Body mass index is 27.1 kg/m².    Estimated Creatinine Clearance: 11 mL/min (A) (based on SCr of 6.1 mg/dL (H)).     Physical Exam   Constitutional:       Appearance: Normal appearance. She is not ill-appearing or toxic-appearing.   HENT:      Head: Normocephalic and atraumatic.      Nose: Nose normal.      Mouth/Throat:      Mouth: Mucous membranes are " moist.      Pharynx: Oropharynx is clear.   Eyes:      Conjunctiva/sclera: Conjunctivae normal.   Cardiovascular:      Rate and Rhythm: Normal rate and regular rhythm.      Pulses: Normal pulses.      Heart sounds: Normal heart sounds.   Pulmonary:      Effort: Pulmonary effort is normal.      Breath sounds: Normal breath sounds.   Abdominal:      General: Bowel sounds are normal.      Palpations: Abdomen is soft.   Musculoskeletal:         General: No swelling or deformity.      Cervical back: Neck supple.   Skin:     General: Skin is warm and dry.      Comments: R femoral HD line site c/d/i   Neurological:      Mental Status: She is alert and oriented to person, place, and time. Mental status is at baseline.   Psychiatric:         Behavior: Behavior normal.         Thought Content: Thought content normal.   Significant Labs:   Microbiology Results (last 7 days)       Procedure Component Value Units Date/Time    Blood culture [[id number]] Collected: 01/01/24 0856    Order Status: Completed Specimen: Blood from Antecubital, Right Updated: 01/03/24 1212     Blood Culture, Routine No Growth to date      No Growth to date      No Growth to date    Blood culture [[id number]] Collected: 01/01/24 0856    Order Status: Completed Specimen: Blood Updated: 01/03/24 1212     Blood Culture, Routine No Growth to date      No Growth to date      No Growth to date    Blood culture [[id number]]  (Abnormal) Collected: 12/30/23 1739    Order Status: Completed Specimen: Blood Updated: 01/03/24 0950     Blood Culture, Routine Gram stain marcella bottle: Gram positive cocci in clusters resembling Staph      Positive results previously called 12/31/2023  22:59      Gram stain aer bottle: Gram positive cocci in clusters resembling Staph      METHICILLIN RESISTANT STAPHYLOCOCCUS AUREUS  ID consult required [place] [facility] [facility].  For susceptibility see order #[id number]      Blood culture [[id number]]  (Abnormal)  Collected: 12/30/23 1741    Order Status: Completed Specimen: Blood Updated: 01/03/24 0949     Blood Culture, Routine Gram stain aer bottle: Gram positive cocci in clusters resembling Staph      Positive results previously called 01/01/2024  01:12      Gram stain marcella bottle: Gram positive cocci in clusters resembling Staph      Positive results previously called 01/01/2024  09:19      METHICILLIN RESISTANT STAPHYLOCOCCUS AUREUS  ID consult required at [facility].  For susceptibility see order #[id number]      Blood culture [[id number]]  (Abnormal)  (Susceptibility) Collected: 12/29/23 0004    Order Status: Completed Specimen: Blood Updated: 01/03/24 0845     Blood Culture, Routine Gram stain aer bottle: Gram positive cocci in clusters resembling Staph      Results called to and read back by: Mary Lopez RN 12/30/2023  09:26      METHICILLIN RESISTANT STAPHYLOCOCCUS AUREUS  ID consult required at [facility].      Blood culture [[id number]]  (Abnormal) Collected: 12/29/23 0004    Order Status: Completed Specimen: Blood Updated: 01/01/24 0831     Blood Culture, Routine Gram stain aer bottle: Gram positive cocci in clusters resembling Staph      Positive results previously called 12/30/2023      METHICILLIN RESISTANT STAPHYLOCOCCUS AUREUS  ID consult required at [facility].  For susceptibility see order # [id number]      MRSA/SA Rapid ID by PCR from Blood culture [[id number]]  (Abnormal) Collected: 12/29/23 0004    Order Status: Completed Updated: 12/30/23 1037     Staph aureus ID by PCR Positive     Methicillin Resistant ID by PCR Positive          All pertinent labs within the past 24 hours have been reviewed.  Recent Lab Results  (Last 5 results in the past 24 hours)        01/03/24  1642   01/03/24  1524   01/03/24  1304   01/03/24  1013   01/03/24  0857        Unit Blood Type Code   6200  [P]                6200  [P]                 6200  [P]                6200  [P]             Unit Expiration   202401242359  [P]                202401242359  [P]                202401242359  [P]                [id number]  [P]             Unit Blood Type   A POS  [P]                A POS  [P]                A POS  [P]                A POS  [P]             Albumin         2.4       ALP         59       ALT         9       Anion Gap         13       aPTT   39.4  Comment: Refer to local heparin nomogram for intensity/dose specific   therapeutic   range.         27.6  Comment: Refer to local heparin nomogram for intensity/dose specific   therapeutic   range.         AST         11       Baso #         0.04       Basophil %         0.4       BILIRUBIN TOTAL         0.4  Comment: For infants and newborns, interpretation of results should be based  on gestational age, weight and in agreement with clinical  observations.    Premature Infant recommended reference ranges:  Up to 24 hours.............<8.0 mg/dL  Up to 48 hours............<12.0 mg/dL  3-5 days..................<15.0 mg/dL  6-29 days.................<15.0 mg/dL         BUN         20       Calcium         8.7       Chloride         97       CO2         24       CODING SYSTEM   FXAR453  [P]                LSVJ651  [P]                IDTX296  [P]                EDSQ580  [P]             CPK         28       Creatinine         6.1       Crossmatch Interpretation   Compatible  [P]                Compatible  [P]                Compatible  [P]                Compatible  [P]             Differential Method         Automated       DISPENSE STATUS   CROSSMATCHED  [P]                CROSSMATCHED  [P]                CROSSMATCHED  [P]                CROSSMATCHED  [P]             eGFR         7.5       Eos #         0.1       Eosinophil %         0.8       Glucose         289       Gran # (ANC)         8.7       Gran %         84.4       Group & Rh   A POS             Hematocrit         25.0       Hemoglobin          8.2       Immature Grans (Abs)         0.14  Comment: Mild elevation in immature granulocytes is non specific and   can be seen in a variety of conditions including stress response,   acute inflammation, trauma and pregnancy. Correlation with other   laboratory and clinical findings is essential.         Immature Granulocytes         1.4       INDIRECT LEILA   NEG             INR         1.0  Comment: Coumadin Therapy:  2.0 - 3.0 for INR for all indicators except mechanical heart valves  and antiphospholipid syndromes which should use 2.5 - 3.5.         Lymph #         0.7       Lymph %         7.0       Magnesium          1.9       MCH         29.7       MCHC         32.8       MCV         91       Mono #         0.6       Mono %         6.0       MPV         10.6       nRBC         0       Platelet Count         159       POCT Glucose 217     267   312         Potassium         4.5       Product Code   O2411Z05  [P]                V2871H67  [P]                Q0847V64  [P]                N5671M51  [P]             PROTEIN TOTAL         6.7       Protime         11.0       RBC         2.76       RDW         17.6       Sodium         134       Specimen Outdate   01/06/2024 23:59             UNIT NUMBER   [id number]  [P]                [id number]  [P]                [id number]  [P]                [id number]  [P]             WBC         10.31                               [P] - Preliminary Result               Significant Imaging: I have reviewed all pertinent imaging results/findings within the past 24 hours.

## 2024-01-04 NOTE — NURSING TRANSFER
Nursing Transfer Note      1/4/2024   7:28 AM    Reason patient is being transferred: dialysis    Transfer To: dialysis unit    Transfer via wheelchair    Transfer with cardiac monitoring    Transported by transportation team    Telemetry: Box Number    Order for Tele Monitor? Yes    Patient belongings transferred with patient:     Chart send with patient:     Notified: spouse by pt

## 2024-01-04 NOTE — PROGRESS NOTES
[facility] Hospital Medicine  Progress Note    Primary Team: [facility]  Admit Date: 12/28/2023    Subjective:      HPI:  Ellen Roman is 58 year old female with PMHx significant for T1DM c/b gastroparesis, retinopathy and nephropathy - ESRD on HD, HTN, HLD, CAD s/p prior PCI with ischemic cardiomyopathy s/p AICD placement, chronic thrombocytopenia, severe mitral regurgitation status post MitraClip, history of fungemia/bacteremia initially presenting to [facility] ER on 12/18 with a complaints of right-sided chest pain associated with nausea and vomiting multiple episodes for the prior 2 days.      She also reported low-grade temperature. Patient reports generalized weakness. Cardiology performed LHC on 12/20/2023 for evaluation of this chest pain with recommendation for medical management for coronary artery disease. Course is then complicated by refractory MRSA bacteremia. HM team initially suspected source of bacteremia was a tunneled dialysis catheter. This was removed upon admission and patient had placement of left IJ. The placement of left IJ trialysis catheter was c/b IJ thrombosis for which she is started on provoked DVT management - currently with lovenox though with ESRD would consider transition to heparin. IR had difficulty placing the right IJ due to thrombosis as well and access subsequently was placed on the right femoral. Due to persistent bacteremia despite vancomycin, patient subsequently had JAVY 12/27/23 with findings reported as positive for vegetations present on the ICD lead wire. As per OSH cardiologist, requested [facility] 2018 CRT-D [employer].      Current medications:  aspirin 81 mg Oral Daily   atorvastatin 40 mg Oral Nightly   cefepime 1 g Intravenous Q24H   DAPTOmycin 6 mg/kg Intravenous Q48H since 12/26  enoxaparin 1 mg/kg (Ideal) Subcutaneous Nightly   epoetin john 100 Units/kg Subcutaneous Once per day on Tuesday Thursday Saturday    hydrALAZINE 25 mg Oral TID   insulin glargine 10 Units Subcutaneous Nightly   insulin lispro 0-10 Units Subcutaneous AC & HS   ipratropium-albuteroL 3 mL Nebulization Q6H   metoprolol succinate XL 50 mg Oral Daily   pantoprazole 40 mg Oral QAM   pregabalin 50 mg Oral Daily since 12/27  rifAMPin 300 mg Oral Daily   sacubitriL-valsartan 1 tablet Oral BID   vancomycin 15 mg/kg Intravenous since 12/18     Overview/Hospital Course:  Transferred from OSH with diagnoses of MRSA bacteremia, cardiac device infection, bilateral thrombosis of IJ. ID, Nephro, EP consulted on admission. JAVY performed 12/29. TTE 12/29:        Left Ventricle: The left ventricle is normal in size. Increased ventricular mass. Increased wall thickness. There is concentric hypertrophy. Normal wall motion. There is normal systolic function with a visually estimated ejection fraction of 60 - 65%. Grade I diastolic dysfunction.    Right Ventricle: Normal right ventricular cavity size. Systolic function is normal. Pacemaker lead present in the ventricle with a 0.7 x 1.8 cm mobile, echogenic structure consistent with a vegetation.    Left Atrium: Left atrium is moderately dilated.    Right Atrium: Right atrium is mildly dilated.    Mitral Valve: The mitral valve is repaired by MitraClip. There is mild regurgitation.    Tricuspid Valve: The tricuspid valve is structurally normal. There is moderate regurgitation. The vegetation on the ventricular lead appears adjacent to but not adherent to the septal leaflet.    Pulmonary Artery: There is pulmonary hypertension. The estimated pulmonary artery systolic pressure is 76 mmHg.    IVC/SVC: Elevated venous pressure at 15 mmHg.    Pericardium: There is a trivial posterior effusion. No indication of cardiac tamponade.     ID changed antibiotics to cefepime and dapto. Endocrine consulted for diabetes management. Nephro consulted for HD needs. Lasix increased to 80mg qday. Patient was continued on heparin gtt for  thrombosed IJ's. JAVY w/ RV pacer lead w/ 0.7 x 1.8 cm mobile, echogenic structure consistent with a vegetation. Did a vanc lock for the Rt fem CVL 12/30. Had issues with hyperglycemia - insulin regimen modified. Patient with several bouts of vomiting PM of 01/02 and am of 01/03- KUB w/large stool burden. Up-titrated bowel regimen.     Interval History:  No complaints. Received HD this AM. Device extraction this afternoon. Holding hydralazine for low-normal BPs.     ROS:  As per HPI  General: no fever, no chills, no weight loss, no fatigue  Cardiovascular: no chest pain, no orthopnea, no dyspnea  Respiratory: no cough, no wheezes, no SOB  All other systems reviewed & are negative.     Past Medical History:   Diagnosis Date    Acute hepatitis A     Acute kidney injury 03/07/2018    Acute respiratory failure with hypoxemia 03/13/2020    Arthritis     Cataract     No date or laterality given    Chronic systolic CHF (congestive heart failure), NYHA class 3     Coronary artery disease     Diabetes mellitus     Dialysis patient     Dilated cardiomyopathy     Diverticulosis of intestine 10/02/2013    Gastroparesis 02/24/2014    Hypertension     ICD (implantable cardioverter-defibrillator), biventricular, in situ     Ischemic cardiomyopathy 04/29/2018    Left bundle branch block 04/29/2018    Microcytic hypochromic anemia 03/07/2018    Migraine 01/24/2016    Presence of drug coated stent in LAD coronary artery     A SHU stent was placed to the mid D#2 in 7-2016, and then into the ostium of the D#2 in 1-2018, and then to the prox-mid D#2 in 3-2017    Presence of drug coated stent in left circumflex coronary artery 01/20/2017    A SHU stent was placed to the proximal OM 1 branch in January of 2017    Severe mitral regurgitation 04/12/2018     Past Surgical History:   Procedure Laterality Date    ABLATION OF ARRHYTHMOGENIC FOCUS FOR SUPRAVENTRICULAR TACHYCARDIA WITH ELECTROPHYSIOLOGY STUDY N/A 10/06/2021    Procedure: Ablation  "SVT;  Surgeon: Joby Gutiérrez MD;  Location: [facility] EP LAB;  Service: Cardiology;  Laterality: N/A;    ANGIOGRAM, CORONARY, WITH LEFT HEART CATHETERIZATION  2022    Procedure: Angiogram, Coronary, with Left Heart Cath;  Surgeon: Manav Schneider MD;  Location: [facility] CATH LAB;  Service: Cardiology;;    BREAST BIOPSY Left     BREAST SURGERY Left     left biopsy    CARDIAC CATHETERIZATION      CARDIAC DEFIBRILLATOR PLACEMENT      CARDIAC ELECTROPHYSIOLOGY STUDY WITH DRUG CHALLENGE  10/06/2021    Procedure: EP Drug challenge;  Surgeon: Joby Gutiérrez MD;  Location: [facility] EP LAB;  Service: Cardiology;;    CATHETERIZATION OF BOTH LEFT AND RIGHT HEART N/A 12/10/2019    Procedure: CATHETERIZATION, HEART, BOTH LEFT AND RIGHT;  Surgeon: LUIS ALFREDO Ernandez MD;  Location: [facility] CATH LAB;  Service: Cardiology;  Laterality: N/A;    ECHOCARDIOGRAM,TRANSESOPHAGEAL N/A 2023    Procedure: Transesophageal echo (JAVY) intra-procedure log documentation;  Surgeon: Provider, Dosc Diagnostic;  Location: [facility] EP LAB;  Service: Cardiology;  Laterality: N/A;    TONSILLECTOMY      TRANSESOPHAGEAL ECHOCARDIOGRAPHY  2016    TRANSESOPHAGEAL ECHOCARDIOGRAPHY N/A 01/10/2022    Procedure: ECHOCARDIOGRAM, TRANSESOPHAGEAL;  Surgeon: Leslie Fishman MD;  Location: [facility] CATH LAB;  Service: Cardiology;  Laterality: N/A;    TRANSESOPHAGEAL ECHOCARDIOGRAPHY N/A 2022    Procedure: ECHOCARDIOGRAM, TRANSESOPHAGEAL;  Surgeon: Manav Schneider MD;  Location: [facility] CATH LAB;  Service: Cardiology;  Laterality: N/A;    TUBAL LIGATION      VASCULAR SURGERY           Objective:   Last 24 Hour Vital Signs:  BP  Min: 88/51  Max: 138/70  Temp  Av.3 °F (36.8 °C)  Min: 98 °F (36.7 °C)  Max: 98.5 °F (36.9 °C)  Pulse  Av.7  Min: 77  Max: 94  Resp  Av.5  Min: 16  Max: 20  SpO2  Av.3 %  Min: 96 %  Max: 100 %  Height  Av' 7" (170.2 cm)  Min: 5' 7" (170.2 cm)  Max: 5' 7" (170.2 cm)  Weight  Av.2 kg (172 lb 6.7 oz)  Min: 78 kg (172 " lb)  Max: 78.4 kg (172 lb 13.5 oz)  I/O last 3 completed shifts:  In: 1040 [P.O.:1040]  Out: -     Physical Examination:  GEN: AAOx3, NAD  HEENT: NCAT, MMM, PERRL, EOMI, oropharynx clear  CV: RRR, no m/r, no S3/S4  RESP: CTAB, no wheezes/crackles, no increased WOB  ABD: soft, NTND, normoactive BS, no organomegaly  EXTR: no c/c/e, intact distal pulses x 4  NEURO: PERRL, EOMI, moving all four extremities, intact sensation to light touch, no focal deficits  SKIN: no rashes, lesions, or color changes  PSYCH: normal affect    Laboratory:  I have reviewed all pertinent lab results/findings within the past 24 hours.    Radiology:  I have reviewed all pertinent imaging results/findings within the past 24 hours.    Current Medications:     Infusions:       Scheduled:   albuterol sulfate  2.5 mg Nebulization Q4H    aspirin  81 mg Oral Daily    atorvastatin  40 mg Oral Daily    calcium acetate(phosphat bind)  667 mg Oral TID WM    ceftaroline (TEFLARO) IVPB  200 mg Intravenous TID    DAPTOmycin (CUBICIN) IV (PEDS and ADULTS)  10 mg/kg Intravenous Q48H    docusate sodium  50 mg Oral Daily    epoetin john  100 Units/kg Intravenous Every Tues, Thurs, Sat    furosemide  80 mg Oral Daily    heparin (PORCINE)  80 Units/kg (Adjusted) Intravenous Once    insulin aspart U-100  6 Units Subcutaneous TIDWM    insulin detemir U-100  16 Units Subcutaneous BID    metoprolol succinate  50 mg Oral Daily    mupirocin   Nasal BID    pantoprazole  40 mg Oral Daily    polyethylene glycol  17 g Oral TID    pregabalin  50 mg Oral Daily    sacubitriL-valsartan  1 tablet Oral BID    senna  8.6 mg Oral Daily    vancomycin (VANCOCIN) 4 mg, heparin (porcine) 5,000 Units IV catheter lock (total volume 2 mL)   Intra-Catheter Daily        PRN:  sodium chloride 0.9%, acetaminophen, albuterol-ipratropium, benzonatate, dextrose 50 % in water (D50W), glucagon (human recombinant), glucose, glucose, heparin (porcine), heparin (porcine), heparin (PORCINE), heparin  (PORCINE), hydrALAZINE, insulin aspart U-100, melatonin, ondansetron, prochlorperazine, promethazine, sodium chloride 0.9%, sodium chloride 0.9%, vancomycin    Prior records reviewed.       Assessment/Plan:     Ellen Roman is a 58 y.o.female with    MRSA bacteremia  Infection associated with cardiac device  - Trialysis line removed due to concern for source  - 12/27 JAVY concern for vegetation reported  - ID consulted - continue dapto 10mg/kg and ceftaroline  - BCx 12/29- MRSA  - BCx 12/30- MRSA  - BCx 01/01- negative  - Continue vanc antibiotic locks  - JAVY performed w/ RV lead vegetation  - EP consulted: device extraction 1/4 followed by repeat BCx's. Patient is not device dependent.      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  Last A1c reviewed-         Lab Results   Component Value Date     HGBA1C 8.0 (H) 12/19/2023      Most recent fingerstick glucose reviewed-          Recent Labs   Lab 01/02/24 2002 01/03/24  0810 01/03/24  1013 01/03/24  1304   POCTGLUCOSE 159* 289* 312* 267*         Current correctional scale  Medium  Maintain anti-hyperglycemic dose as follows-   Antihyperglycemics (From admission, onward)        Start     Stop Route Frequency Ordered     01/01/24 2100   insulin detemir U-100 (Levemir) pen 16 Units         -- SubQ 2 times daily 01/01/24 1223     12/31/23 1200   insulin aspart U-100 pen 0-5 Units         -- SubQ Before meals, nightly and at 0200 PRN 12/31/23 1151     12/31/23 0745   insulin aspart U-100 pen 6 Units         -- SubQ 3 times daily with meals 12/31/23 0744             Hold Oral hypoglycemics while patient is in the hospital.  Endo following     Chronic kidney  disease-mineral and bone disorder  Creatine stable for now. BMP reviewed- noted Estimated Creatinine Clearance: 11 mL/min (A) (based on SCr of 6.1 mg/dL (H)). according to latest data. Based on current GFR, CKD stage is end stage.  Monitor UOP and serial BMP and adjust therapy as needed. Renally dose meds. Avoid nephrotoxic medications and procedures.     - continue calcium acetate     Anemia due to stage 5 chronic kidney disease, not on chronic dialysis  Patient's anemia is currently uncontrolled. Has not received any PRBCs to date. Etiology likely d/t chronic disease due to Chronic Kidney Disease/ESRD  Current CBC reviewed-         Lab Results   Component Value Date     HGB 8.2 (L) 01/03/2024     HCT 25.0 (L) 01/03/2024      Monitor serial CBC and transfuse if patient becomes hemodynamically unstable, symptomatic or H/H drops below 7/21.  - continue epo     ESRD (end stage renal disease)  - ESRD with trialysis line removed.   - Nephrology following: has R fem CVL - will need clearance of blood cultures and ICD removal first prior to new TDC     Cardiac resynchronization therapy defibrillator (CRT-D) in place  - [employer] CRT-D implanted 2018     Nonrheumatic mitral valve regurgitation  - S/p mitraclip      Coronary artery disease involving native coronary artery of native heart without angina pectoris  - S/P PCI D2 with SHU x 1 (07/13/2016)  - S/P PCI OM1 with SHU x 1 (01/04/2017)  - S/P PCI prox D2 with SHU x 1 (03/07/2017)  - Patent stents with NCCAD by LHC 12/10/2019  - S/P LHC with RCA 90 % stenosis, SHU x 3 (4/5/2022)     Most recent LHC at OSH with CAD but no intervention. Continue aspirin, statin      Aileen Wade MD  Hospital Medicine Staff  [facility]

## 2024-01-04 NOTE — NURSING TRANSFER
Nursing Transfer Note      1/4/2024   11:15 AM    Nurse giving handoff:Junior PERKINS  Nurse receiving handoff:Cris PERKINS    Reason patient is being transferred: dialysis done    Transfer To: room 322    Transfer via wheelchair    Transfer with cardiac monitoring    Transported by transportation team    Telemetry: Box Number    Order for Tele Monitor? Yes    Any special needs or follow-up needed: keep NPO    Patient belongings transferred with patient: Yes    Chart send with patient: Yes    Notified: spouse    Patient reassessed at: 1115 01/04/2024    Upon arrival to floor: cardiac monitor applied, patient oriented to room, call bell in reach, and bed in lowest position

## 2024-01-04 NOTE — SUBJECTIVE & OBJECTIVE
Interval History: TAYLOR    Review of Systems  Constitutional:  Negative for fever.   HENT:  Negative for trouble swallowing.    Respiratory:  Negative for cough.    Cardiovascular:  Negative for chest pain.   Gastrointestinal:  Negative for abdominal pain, diarrhea and vomiting.   Musculoskeletal:  Negative for arthralgias and back pain.   Psychiatric/Behavioral:  Negative for confusion.    Objective:     Vital Signs (Most Recent):  Temp: 97 °F (36.1 °C) (01/03/24 1509)  Pulse: 90 (01/03/24 1811)  Resp: 17 (01/03/24 1509)  BP: (!) 116/59 (01/03/24 1811)  SpO2: 96 % (01/03/24 1509) Vital Signs (24h Range):  Temp:  [97 °F (36.1 °C)-100.3 °F (37.9 °C)] 97 °F (36.1 °C)  Pulse:  [] 90  Resp:  [16-20] 17  SpO2:  [91 %-98 %] 96 %  BP: ()/(57-78) 116/59     Weight: 78.5 kg (173 lb)  Body mass index is 27.1 kg/m².    Estimated Creatinine Clearance: 11 mL/min (A) (based on SCr of 6.1 mg/dL (H)).     Physical Exam   Constitutional:       Appearance: Normal appearance. She is not ill-appearing or toxic-appearing.   HENT:      Head: Normocephalic and atraumatic.      Nose: Nose normal.      Mouth/Throat:      Mouth: Mucous membranes are moist.      Pharynx: Oropharynx is clear.   Eyes:      Conjunctiva/sclera: Conjunctivae normal.   Cardiovascular:      Rate and Rhythm: Normal rate and regular rhythm.      Pulses: Normal pulses.      Heart sounds: Normal heart sounds.   Pulmonary:      Effort: Pulmonary effort is normal.      Breath sounds: Normal breath sounds.   Abdominal:      General: Bowel sounds are normal.      Palpations: Abdomen is soft.   Musculoskeletal:         General: No swelling or deformity.      Cervical back: Neck supple.   Skin:     General: Skin is warm and dry.      Comments: R femoral HD line site c/d/i   Neurological:      Mental Status: She is alert and oriented to person, place, and time. Mental status is at baseline.   Psychiatric:         Behavior: Behavior normal.         Thought Content:  Thought content normal.   Significant Labs:   Microbiology Results (last 7 days)       Procedure Component Value Units Date/Time    Blood culture [[id number]] Collected: 01/01/24 0856    Order Status: Completed Specimen: Blood from Antecubital, Right Updated: 01/03/24 1212     Blood Culture, Routine No Growth to date      No Growth to date      No Growth to date    Blood culture [[id number]] Collected: 01/01/24 0856    Order Status: Completed Specimen: Blood Updated: 01/03/24 1212     Blood Culture, Routine No Growth to date      No Growth to date      No Growth to date    Blood culture [[id number]]  (Abnormal) Collected: 12/30/23 1739    Order Status: Completed Specimen: Blood Updated: 01/03/24 0950     Blood Culture, Routine Gram stain marcella bottle: Gram positive cocci in clusters resembling Staph      Positive results previously called 12/31/2023  22:59      Gram stain aer bottle: Gram positive cocci in clusters resembling Staph      METHICILLIN RESISTANT STAPHYLOCOCCUS AUREUS  ID consult required at [facility].  For susceptibility see order #[id number]      Blood culture [[id number]]  (Abnormal) Collected: 12/30/23 1741    Order Status: Completed Specimen: Blood Updated: 01/03/24 0949     Blood Culture, Routine Gram stain aer bottle: Gram positive cocci in clusters resembling Staph      Positive results previously called 01/01/2024  01:12      Gram stain marcella bottle: Gram positive cocci in clusters resembling Staph      Positive results previously called 01/01/2024  09:19      METHICILLIN RESISTANT STAPHYLOCOCCUS AUREUS  ID consult required at [facility].  For susceptibility see order #[id number]      Blood culture [[id number]]  (Abnormal)  (Susceptibility) Collected: 12/29/23 0004    Order Status: Completed Specimen: Blood Updated: 01/03/24 0845     Blood Culture, Routine Gram stain aer bottle: Gram positive cocci in clusters resembling Staph       Results called to and read back by: Mary Lopez RN 12/30/2023  09:26      METHICILLIN RESISTANT STAPHYLOCOCCUS AUREUS  ID consult required at [facility] and [facility].      Blood culture [[id number]]  (Abnormal) Collected: 12/29/23 0004    Order Status: Completed Specimen: Blood Updated: 01/01/24 0831     Blood Culture, Routine Gram stain aer bottle: Gram positive cocci in clusters resembling Staph      Positive results previously called 12/30/2023      METHICILLIN RESISTANT STAPHYLOCOCCUS AUREUS  ID consult required at [facility] and [facility] locations.  For susceptibility see order # [id number]      MRSA/SA Rapid ID by PCR from Blood culture [[id number]]  (Abnormal) Collected: 12/29/23 0004    Order Status: Completed Updated: 12/30/23 1037     Staph aureus ID by PCR Positive     Methicillin Resistant ID by PCR Positive          All pertinent labs within the past 24 hours have been reviewed.  Recent Lab Results  (Last 5 results in the past 24 hours)        01/03/24  1642   01/03/24  1524   01/03/24  1304   01/03/24  1013   01/03/24  0857        Unit Blood Type Code   6200  [P]                6200  [P]                6200  [P]                6200  [P]             Unit Expiration   [id number]  [P]                [id number]  [P]                [id number]  [P]                [id number]  [P]             Unit Blood Type   A POS  [P]                A POS  [P]                A POS  [P]                A POS  [P]             Albumin         2.4       ALP         59       ALT         9       Anion Gap         13       aPTT   39.4  Comment: Refer to local heparin nomogram for intensity/dose specific   therapeutic   range.         27.6  Comment: Refer to local heparin nomogram for intensity/dose specific   therapeutic   range.         AST         11       Baso #         0.04       Basophil %         0.4       BILIRUBIN TOTAL         0.4  Comment: For infants and newborns, interpretation of  results should be based  on gestational age, weight and in agreement with clinical  observations.    Premature Infant recommended reference ranges:  Up to 24 hours.............<8.0 mg/dL  Up to 48 hours............<12.0 mg/dL  3-5 days..................<15.0 mg/dL  6-29 days.................<15.0 mg/dL         BUN         20       Calcium         8.7       Chloride         97       CO2         24       CODING SYSTEM   UAUS056  [P]                [id number]  [P]                CPIQ858  [P]                [id number]  [P]             CPK         28       Creatinine         6.1       Crossmatch Interpretation   Compatible  [P]                Compatible  [P]                Compatible  [P]                Compatible  [P]             Differential Method         Automated       DISPENSE STATUS   CROSSMATCHED  [P]                CROSSMATCHED  [P]                CROSSMATCHED  [P]                CROSSMATCHED  [P]             eGFR         7.5       Eos #         0.1       Eosinophil %         0.8       Glucose         289       Gran # (ANC)         8.7       Gran %         84.4       Group & Rh   A POS             Hematocrit         25.0       Hemoglobin         8.2       Immature Grans (Abs)         0.14  Comment: Mild elevation in immature granulocytes is non specific and   can be seen in a variety of conditions including stress response,   acute inflammation, trauma and pregnancy. Correlation with other   laboratory and clinical findings is essential.         Immature Granulocytes         1.4       INDIRECT LEILA   NEG             INR         1.0  Comment: Coumadin Therapy:  2.0 - 3.0 for INR for all indicators except mechanical heart valves  and antiphospholipid syndromes which should use 2.5 - 3.5.         Lymph #         0.7       Lymph %         7.0       Magnesium          1.9       MCH         29.7       MCHC         32.8       MCV         91       Mono #         0.6       Mono %         6.0       MPV         10.6       nRBC          0       Platelet Count         159       POCT Glucose 217     267   312         Potassium         4.5       Product Code   O8800Q80  [P]                T7536W13  [P]                [id number]  [P]                E7046T87  [P]             PROTEIN TOTAL         6.7       Protime         11.0       RBC         2.76       RDW         17.6       Sodium         134       Specimen Outdate   01/06/2024 23:59             UNIT NUMBER   [id number]  [P]                [id number]  [P]                [id number]  [P]                [id number]  [P]             WBC         10.31                               [P] - Preliminary Result               Significant Imaging: I have reviewed all pertinent imaging results/findings within the past 24 hours.

## 2024-01-04 NOTE — NURSING
Lab called renal function panel lab sample was hemolyzed, need recollect, lab ordered, dialysis MADDIE Ortiz notified.    Parent

## 2024-01-05 LAB
GLUCOSE SERPL-MCNC: 283 MG/DL (ref 70–110)
GLUCOSE SERPL-MCNC: 313 MG/DL (ref 70–110)
GLUCOSE SERPL-MCNC: 316 MG/DL (ref 70–110)
HCO3 UR-SCNC: 10.3 MMOL/L (ref 24–28)
HCO3 UR-SCNC: 20.6 MMOL/L (ref 24–28)
HCO3 UR-SCNC: 9 MMOL/L (ref 24–28)
HCT VFR BLD CALC: 17 %PCV (ref 36–54)
HCT VFR BLD CALC: 17 %PCV (ref 36–54)
HCT VFR BLD CALC: 19 %PCV (ref 36–54)
PCO2 BLDA: 35 MMHG (ref 35–45)
PCO2 BLDA: 40.5 MMHG (ref 35–45)
PCO2 BLDA: 44.5 MMHG (ref 35–45)
PH SMN: 6.97 [PH] (ref 7.35–7.45)
PH SMN: 7.02 [PH] (ref 7.35–7.45)
PH SMN: 7.31 [PH] (ref 7.35–7.45)
PO2 BLDA: 113 MMHG (ref 80–100)
PO2 BLDA: 310 MMHG (ref 80–100)
PO2 BLDA: 86 MMHG (ref 80–100)
POC BE: -21 MMOL/L
POC BE: -22 MMOL/L
POC BE: -6 MMOL/L
POC IONIZED CALCIUM: 1.15 MMOL/L (ref 1.06–1.42)
POC IONIZED CALCIUM: 1.44 MMOL/L (ref 1.06–1.42)
POC IONIZED CALCIUM: >2.5 MMOL/L (ref 1.06–1.42)
POC SATURATED O2: 100 % (ref 95–100)
POC SATURATED O2: 89 % (ref 95–100)
POC SATURATED O2: 98 % (ref 95–100)
POC TCO2: 10 MMOL/L (ref 23–27)
POC TCO2: 12 MMOL/L (ref 23–27)
POC TCO2: 22 MMOL/L (ref 23–27)
POTASSIUM BLD-SCNC: 4.6 MMOL/L (ref 3.5–5.1)
POTASSIUM BLD-SCNC: 5 MMOL/L (ref 3.5–5.1)
POTASSIUM BLD-SCNC: 5.7 MMOL/L (ref 3.5–5.1)
SAMPLE: ABNORMAL
SODIUM BLD-SCNC: 135 MMOL/L (ref 136–145)
SODIUM BLD-SCNC: 143 MMOL/L (ref 136–145)
SODIUM BLD-SCNC: 151 MMOL/L (ref 136–145)

## 2024-01-05 NOTE — BRIEF OP NOTE
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      Anesthesia: General anesthesia       Procedure:   Successful complex laser lead extraction with successful laser lead extraction of the coronary sinus lead and successful laser lead extraction of the right atrial FineLine lead. The pulse generator was successfully removed. These extracted lead tips were sent for microbial analysis.   Unsuccessful laser lead extraction of the right ventricular dual-coil defibrillatory lead with a very large vegetation (approximately 3.3cm) at the level of the tricuspid valve that was unable to be advanced through with either the 16Fr laser sheath or use of the Visi sheath. While holding rail traction on this lead, she had a short run of ventricular tachycardia with a subsequent cardiac arrest and extraction of this lead was abandoned.   Barrie purulence was appreciated upon entering the pocket site with multiple swabs sent for microbial analysis.   Pocket revision with complete removal of all nectrotic tissue with thorough surgical debridement of her infected capsule.   Ultrasound-guided placement of a 12Fr Bridge balloon, a short 6Fr sheath for possible snare use, a 9Fr sheath for anesthesia use, and a short 6Fr sheath with placement of a temporary transvenous pacing wire, placed into the right ventricle for back-up pacing.   Ventricular fibrillation with VF arrest with successful external defibrillation with return to sinus rhythm. She had a subsequent PEA arrest with inability to obtain ROSC.   A small posterior pericardial effusion was noted following chest compressions with successful pericardiocentesis and placement of a pericardial drain. Despite removal of this small amount of  pericardial fluid, she remained in LV standstill with inability to obtain ROSC.     Procedure Details:  The patient was brought to the electrophysiology laboratory in a fasted state. A time out was performed to confirm patient identity, the proposed procedure and appropriate venous access site, and to verify signed informed consent. Antibiotics were administered intravenously prior to incision for antibiotic prophylaxis. General anesthesia was administered and a JAVY probe was placed for direct visulaization. The ICD tachycardia therapies were disabled. The bilateral femoral groins were prepped and draped in sterile fashion. Local anesthesia was administered in the dermal and soft tissue region of the right femoral vein. Utilizing ultrasound guidance with direct visualization, the right femoral vein was cannulated via the modified Seldinger technique with two venipunctures. An Amplatz extra stiff wire was advanced into the superior vena cava with placement of a 12Fr sheath for the Bridge balloon. A short 6Fr sheath was placed in the event a snare was required. On the left femoral vein, a 9Fr sheath was placed for Anesthesia use, with a short 6Fr sheath placed with a Robbi quadripolar catheter advanced into the RV for temporary pacing. The left axillary vein was accessed percutaneously in a standard fashion at one site. Under fluoroscopic guidance, one guidewire was advanced into the inferior vena cava. A skin incision was then made with lindsey purulence appreciated upon entering the pocket site with multiple swabs sent for microbial analysis. The pocket was revised with a complete excision of pocket, an excision of the pocket base and an excisional debridement of necrotic tissue using sharp dissection, blunt dissection, electrocautery and a Plasma Blade. The pocket was irrigated with Vancomycyin 1G/NS 500mL antibiotic solution. All leads were assessed with placement of a stylet - the RV lead was able to  accommodate a 2 65cm locking stylet, as was the RA lead. The coronary sinus lead was able to accommodate an EZ E locking stylet. The coronary sinus lead stylet was unable to be advanced into the distal poles and was advanced as far as possible into the region of the coronary sinus os. The right ventricular lead was attempted to be extracted first, with use of a SubC Tightrail for binding at the region of the clavicle and first rib. This sheath was able to advance to the RA/SVC junction, but not over the proximal coil of the dual-coil lead. A 14Fr and then a subsequent 16Fr laser sheath was utilized to free the RV lead past the proximal coil. This lead started to snowplow and attention was turned to the coronary sinus lead in an effort to remove more leads to allow more freedom of motion at the proximal binding location. The coronary sinus lead was then extracted with the 14Fr laser sheath. This lead broke prior to being able to be fully extracted, leaving 2 poles in the distal target branch of the coronary sinus. This lead tip was sent for microbial analysis. The FineLine RA lead was then targeted with use of the 14Fr and then the 16Fr laser sheath with the Vizi outer. This lead was able to be freed from the body and was successfully extracted. This lead tip was sent for microbial analysis. Lastly, attention was turned again to the RV lead. The 16Fr laser sheath was again taken over this lead with ability to free the lead to the level of immediately proximal to the tricuspid valve. There was a very large vegetation noted adherent to this lead and the 16Fr sheath was not able to successfully laser through this vegetation. The Visi sheath was taken over in an effort to manually progress through the vegetation to free the lead. With rail traction on the RV lead, she had a short run of VT and manual traction was immediately released. She recovered normal sinus rhythm, but her arterial line pressures were slightly lower,  with dampened waveforms. A JAVY revealed a small posterior pericardial effusion. The CT surgical OR backup team and interventional cardiology were immediately paged. The imaging attending was already present for assistance in direct JAVY imaging. Pressor augmentation was started, and her pulse was noted to be thready. Compressions were started despite a weak pulse for improved perfusion. She had an event of polymorphic VT requiring external defibrillation with return to sinus rhythm without perfusing pressures. On JAVY her LV was nonmobile in LV standstill with an estimated LVEF of 5%. In the event that her small pericardial effusion was contributing, this was successfully tapped with removal of approximately 150cc of sanguinous fluid. Despite placement of a pericardial drain, she remained in LV standstill, and with cessation of compressions did not have a pulse. Her code was run for approximately 45 minutes. An interdisciplinary team approach was utilized with CT surgery attending Dr. Kebede, who felt that the patient was too unstable with overwhelming sepsis to survive an open chest procedure, and interventional attending Dr. Richards, who did not feel that an Impella or mechanical cardiac support system would be possible, nor beneficial at that point. She was pronounced  and the family was immediately notified.           Please see the completed operative report for full details.     DIOGENES Santiago MD  EP Attending

## 2024-01-05 NOTE — PLAN OF CARE
DEATH NOTE    In the EP lab, during the infected CRT-D extraction, she developed hypotension for which she underwent CPR with several rounds of Epi. CPR was done for about 1 h with no ROSC achieved.    During my exam the patient does not respond to verbal or tactile stimuli. There are no intact pupillary, corneal, oculovestibular, or gag reflexes. The pupils are fixed and dilated. There are no heart or breath sounds on auscultation. There are no visible respirations. There are no palpable carotid, femoral or peripheral pulses. This exam is consistent with death.      Time of my exam:  1938 on 1/4/24     Preliminary cause of death: MRSA bacteremia & Sepsis     Sixto Cordero MD  Cardiology Fellow PGY-5

## 2024-01-05 NOTE — NURSING
Unable to give pregabalin at 1700 d/t pt still at EP unit, and 1922 pt still at EP unit, notified night RN to give it when pt comes back to room 322.

## 2024-01-05 NOTE — TRANSFER OF CARE
"Anesthesia Transfer of Care Note    Patient: Ellen Roman    Procedure(s) Performed: Procedure(s) (LRB):  EXTRACTION, ELECTRODE LEAD (N/A)  Transesophageal echo (JAVY) intra-procedure log documentation (N/A)  Cardioversion or Defibrillation  Cardioversion  Pericardiocentesis (N/A)    Patient location: Other: Code cancelled 1938 EP3    Anesthesia Type: general    Complications: death    Transfer of care protocol handoff checklist not completed for medical reasons      Last vitals: Visit Vitals  /66   Pulse 86   Temp 36.9 °C (98.5 °F) (Oral)   Resp 18   Ht 5' 7" (1.702 m)   Wt 78 kg (172 lb)   LMP  (LMP Unknown)   SpO2 98%   BMI 26.94 kg/m²     " Rivaroxaban/Xarelto is used to treat and prevent blood clots.  If you are not able to swallow the tablets whole, you may crush the tablets and mix them with a small amount of applesauce and promptly take within four hours. Eat some food right after you swallow the mixture. Take 2.5mg or 10mg tablets of Rivaroxaban/Xarelto with or without food. Take 15mg or 20mg tablets of Rivaroxaban/Xarelto with food. Never skip a dose of Rivaroxaban/Xarelto.  If you take Rivaroxaban/Xarelto once a day and you forget to take your dose, take a dose as soon as you remember on the same day. If you take Rivaroxaban/Xarelto 2.5 mg twice a day and you forget to take your dose, skip the missed dose and take your next dose at your usual time. DO NOT take an extra pill to ‘catch up’. If you take Rivaroxaban/Xarelto 15 mg twice a day and you forget to take your dose, take a dose as soon as you remember on the same day. You may take 2 doses at the same time to make up for missed dose ONLY if you take a total of 30mg per day. Notify your doctor that you missed a dose. Take Rivaroxaban/Xarelto at the same time each morning and evening. Rivaroxaban/Xarelto may be taken with other medication or food.

## 2024-01-05 NOTE — DISCHARGE SUMMARY
[facility] - Cardiology [facility] Medicine  Death Summary      Patient Name: Ellen Roman  MRN: [id number]  SYED: [id number]  Patient Class: IP- Inpatient  Admission Date: 12/28/2023  Hospital Length of Stay: 7 days  Death Date and Time: 1/7/2024   Primary Care Provider: Paco Amanda MD  Hospital Medicine Team: [facility]OS[facility] MED C Aileen Wade MD  Primary Care Team: [facility] MED C    HPI:   Ellen Roman is 58 year old female with PMHx significant for T1DM c/b gastroparesis, retinopathy and nephropathy - ESRD on HD, HTN, HLD, CAD s/p prior PCI with ischemic cardiomyopathy s/p AICD placement, chronic thrombocytopenia, severe mitral regurgitation status post MitraClip, history of fungemia/bacteremia initially presenting to [facility] ER on 12/18 with a complaints of right-sided chest pain associated with nausea and vomiting multiple episodes for the prior 2 days.     She also reported low-grade temperature. Patient reports generalized weakness. Cardiology performed LHC on 12/20/2023 for evaluation of this chest pain with recommendation for medical management for coronary artery disease. Course is then complicated by refractory MRSA bacteremia. HM team initially suspected source of bacteremia was a tunneled dialysis catheter. This was removed upon admission and patient had placement of left IJ. The placement of left IJ trialysis catheter was c/b IJ thrombosis for which she is started on provoked DVT management - currently with lovenox though with ESRD would consider transition to heparin. IR had difficulty placing the right IJ due to thrombosis as well and access subsequently was placed on the right femoral. Due to persistent bacteremia despite vancomycin, patient subsequently had JAVY 12/27/23 with findings reported as positive for vegetations present on the ICD lead wire. As per OS cardiologist, requested transfer from [facility] to [facility]. 2018 CRT-D [employer].     Current medications:  aspirin 81 mg Oral Daily    atorvastatin 40 mg Oral Nightly   cefepime 1 g Intravenous Q24H   DAPTOmycin 6 mg/kg Intravenous Q48H since 12/26  enoxaparin 1 mg/kg (Ideal) Subcutaneous Nightly   epoetin john 100 Units/kg Subcutaneous Once per day on Tuesday Thursday Saturday   hydrALAZINE 25 mg Oral TID   insulin glargine 10 Units Subcutaneous Nightly   insulin lispro 0-10 Units Subcutaneous AC & HS   ipratropium-albuteroL 3 mL Nebulization Q6H   metoprolol succinate XL 50 mg Oral Daily   pantoprazole 40 mg Oral QAM   pregabalin 50 mg Oral Daily since 12/27  rifAMPin 300 mg Oral Daily   sacubitriL-valsartan 1 tablet Oral BID   vancomycin 15 mg/kg Intravenous since 12/18    Procedure(s) (LRB):  EXTRACTION, ELECTRODE LEAD (N/A)  Transesophageal echo (JAVY) intra-procedure log documentation (N/A)  Cardioversion or Defibrillation  Cardioversion  Pericardiocentesis (N/A)      Hospital Course:   Transferred from [facility] with diagnoses of MRSA bacteremia, cardiac device infection, bilateral thrombosis of IJ. ID, Nephro, EP consulted on admission. JAVY performed 12/29. TTE 12/29:        Left Ventricle: The left ventricle is normal in size. Increased ventricular mass. Increased wall thickness. There is concentric hypertrophy. Normal wall motion. There is normal systolic function with a visually estimated ejection fraction of 60 - 65%. Grade I diastolic dysfunction.    Right Ventricle: Normal right ventricular cavity size. Systolic function is normal. Pacemaker lead present in the ventricle with a 0.7 x 1.8 cm mobile, echogenic structure consistent with a vegetation.    Left Atrium: Left atrium is moderately dilated.    Right Atrium: Right atrium is mildly dilated.    Mitral Valve: The mitral valve is repaired by MitraClip. There is mild regurgitation.    Tricuspid Valve: The tricuspid valve is structurally normal. There is moderate regurgitation. The vegetation on the ventricular lead appears adjacent to but not adherent to the septal leaflet.    Pulmonary  Artery: There is pulmonary hypertension. The estimated pulmonary artery systolic pressure is 76 mmHg.    IVC/SVC: Elevated venous pressure at 15 mmHg.    Pericardium: There is a trivial posterior effusion. No indication of cardiac tamponade.     ID changed antibiotics to cefepime and dapto. Endocrine consulted for diabetes management. Nephro consulted for HD needs. Lasix increased to 80mg qday. Patient was continued on heparin gtt for thrombosed IJ's. JAVY w/ RV pacer lead w/ 0.7 x 1.8 cm mobile, echogenic structure consistent with a vegetation. Did a vanc lock for the Rt fem CVL 12/30. Had issues with hyperglycemia - insulin regimen modified. Patient with several bouts of vomiting PM of 01/02 and am of 01/03- KUB w/large stool burden. Up-titrated bowel regimen. Had a bowel movement.     On 1/4, patient underwent device extraction for cardiac device infection. During procedure, had cardiac arrest; team unable to achieve ROSC and death was pronounced.          Goals of Care Treatment Preferences:  Code Status: Full Code      Consults:   Consults (From admission, onward)          Status Ordering Provider     Inpatient consult to Midline team  Once        Provider:  (Not yet assigned)    Completed WENDY HERNANDEZ     Inpatient consult to Midline team  Once        Provider:  (Not yet assigned)    Completed WENDY HERNANDEZ     Inpatient consult to Midline team  Once        Provider:  (Not yet assigned)    Completed WENDY HERNANDEZ     Inpatient consult to Cardiothoracic Surgery  Once        Provider:  (Not yet assigned)    Completed GILLIES, CONNOR M     Inpatient consult to Electrophysiology  Once        Provider:  (Not yet assigned)    Completed SHASHA LU     Inpatient consult to Endocrinology  Once        Provider:  (Not yet assigned)    Completed SHASHA LU     Inpatient consult to Nephrology  Once        Provider:  (Not yet assigned)    Completed SHASHA LU     Inpatient consult to  Infectious Diseases  Once        Provider:  (Not yet assigned)    Completed SHASHA LU            No new Assessment & Plan notes have been filed under this hospital service since the last note was generated.  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] 2023 Unknown    MRSA bacteremia [R78.81, B95.62] 2023 Yes    ESRD (end stage renal disease) [N18.6]  Yes    Cardiac resynchronization therapy defibrillator (CRT-D) in place [Z95.810]  Yes    Coronary artery disease involving native coronary artery of native heart without angina pectoris [I25.10] 2019 Yes    Nonrheumatic mitral valve regurgitation [I34.0] 2018 Yes      Problems Resolved During this Admission:    Diagnosis Date Noted Date Resolved POA    Endocarditis [I38] 2024 Yes    Type 2 diabetes mellitus with circulatory disorder, with long-term current use of insulin [E11.59, Z79.4]  2023 Not Applicable       Discharged Condition:     Disposition:         Time spent on the discharge of patient: 20 minutes         Aileen Wade MD  Department of Hospital Medicine  [facility] - Cardiology Stepdown

## 2024-01-05 NOTE — PROGRESS NOTES
Perfusion Standby Note:      01/04/2024  Perfusionist:  Opal Long  [facility]  Surgeon(s) and Role:  Panel 1:     * DIOGENES Santiago MD - Primary  Panel 2:     * Raj Sanchez III, MD - Primary     * Sixto Cordero MD - Fellow  Anesthesiologist:  Sam Locke MD    Past Medical History:   Diagnosis Date    Acute hepatitis A     Acute kidney injury 03/07/2018    Acute respiratory failure with hypoxemia 03/13/2020    Arthritis     Cataract     No date or laterality given    Chronic systolic CHF (congestive heart failure), NYHA class 3     Coronary artery disease     Diabetes mellitus     Dialysis patient     Dilated cardiomyopathy     Diverticulosis of intestine 10/02/2013    Gastroparesis 02/24/2014    Hypertension     ICD (implantable cardioverter-defibrillator), biventricular, in situ     Ischemic cardiomyopathy 04/29/2018    Left bundle branch block 04/29/2018    Microcytic hypochromic anemia 03/07/2018    Migraine 01/24/2016    Presence of drug coated stent in LAD coronary artery     A SHU stent was placed to the mid D#2 in 7-2016, and then into the ostium of the D#2 in 1-2018, and then to the prox-mid D#2 in 3-2017    Presence of drug coated stent in left circumflex coronary artery 01/20/2017    A SHU stent was placed to the proximal OM 1 branch in January of 2017    Severe mitral regurgitation 04/12/2018         Perfusion department standby was requested for this case, utilizing either a full cardiopulmonary machine or ECMO pump.      All pre-op checklists were completed, all equipment was available, as were cannulae and other disposables.  A TAVR instrument tray was also verified as available, per the checklist, for any case requiring ECMO standby.    I fully participated in the briefing and time-out for this procedure.  I was present in the room for all critical portions of this case during which mechanical circulatory support could be required.  Please see cardiopulmonary bypass record  for details.  There were no complications.    7:34 PM

## 2024-01-05 NOTE — PLAN OF CARE
Keep NPO for procedure. BG monitored. Spouse at bedside. Pt had 2.5 L fluid removed through dialysis. Pt educated on fall risk and remained free from falls/trauma/injury. Denies chest pain, SOB, palpitations, dizziness, pain, or discomfort. Plan of care reviewed with pt, all questions answered. Bed locked in lowest position, call bell within reach, no acute distress noted, will continue to monitor.

## 2024-01-05 NOTE — PLAN OF CARE
"   24 0804   Final Note   Assessment Type Final Discharge Note   Anticipated Discharge Disposition      Per physician note ," patient  " 2024 time of note .    Rani Dinero RN    [phone]    "

## 2024-01-05 NOTE — ANESTHESIA POSTPROCEDURE EVALUATION
Anesthesia Post Evaluation    Patient: Ellen Roman    Procedure(s) Performed: Procedure(s) (LRB):  EXTRACTION, ELECTRODE LEAD (N/A)  Transesophageal echo (JAVY) intra-procedure log documentation (N/A)  Cardioversion or Defibrillation  Cardioversion  Pericardiocentesis (N/A)    Final Anesthesia Type: general      Patient location during evaluation: Cath Lab  Patient participation: No - Unable to Participate, Other Reason (see comments)      Anesthetic complications: yes  Perioperative Events: death        Follow-up not needed.  Comments: After lead extraction, patient became hypotensive despite multiple pressors and CPR.  LV non functioning.  Code called at 19:38              Vitals Value Taken Time   BP  01/04/24 2056   Temp  01/04/24 2056   Pulse  01/04/24 2056   Resp  01/04/24 2056   SpO2  01/04/24 2056         No case tracking events are documented in the log.      Pain/Alan Score: No data recorded

## 2024-01-06 LAB
ASCENDING AORTA: 3.3 CM
BACTERIA BLD CULT: NORMAL
BACTERIA BLD CULT: NORMAL
BSA FOR ECHO PROCEDURE: 1.92 M2
EJECTION FRACTION: 55 %
SINUS: 3.5 CM
STJ: 2.9 CM

## 2024-01-08 LAB
BACTERIA SPEC AEROBE CULT: NO GROWTH

## 2024-01-11 LAB
BACTERIA SPEC ANAEROBE CULT: NORMAL

## 2024-01-12 LAB
BACTERIA SPEC ANAEROBE CULT: NORMAL
BACTERIA SPEC ANAEROBE CULT: NORMAL

## (undated) DEVICE — KIT SHEATH GLIDELIGHT LSR 16FR

## (undated) DEVICE — DEVICE LEAD EXTRACTION 1 65CM

## (undated) DEVICE — DRAPE INCISE IOBAN 2 23X17IN

## (undated) DEVICE — SHEATH INTRODUCER 9FR 11CM

## (undated) DEVICE — INTRODUCER HEMOSTASIS 6.5FR

## (undated) DEVICE — DILATOR VISISHEATH 16FR 43CM

## (undated) DEVICE — Device

## (undated) DEVICE — DEVICE PLASMABLADE X 3.0S LT

## (undated) DEVICE — LINE PRESSURE MONITORING 96IN

## (undated) DEVICE — PACK PACER PERMANENT OMC

## (undated) DEVICE — PAD DEFIB CADENCE ADULT R2

## (undated) DEVICE — KIT BRIDGE ACCESSORY

## (undated) DEVICE — KIT PROBE COVER WITH GEL

## (undated) DEVICE — CUTTER LEAD

## (undated) DEVICE — COVER TABLE 44X90 STERILE

## (undated) DEVICE — CATH 5FR MULTI MPA2

## (undated) DEVICE — R CATH RESPONS QPLR JSN 6F 120

## (undated) DEVICE — ELECTRODE REM PLYHSV RETURN 9

## (undated) DEVICE — CATH BRIDGE OCCL BLLN 80CM

## (undated) DEVICE — SHEATH TIGHTRAL SUB-C 11FR

## (undated) DEVICE — NDL PERCUTANEOUS ENTRYBSDN 18

## (undated) DEVICE — KIT LEAD LOCKING DEVICE ACC

## (undated) DEVICE — DEVICE LEAD EXTRACTION 2 65CM

## (undated) DEVICE — KIT PERIVAC W/ STR CATH 8.3FR

## (undated) DEVICE — DILATOR VISISHEATH 14FR 43CM

## (undated) DEVICE — KIT SHEATH GLIDELIGHT LSR 14FR

## (undated) DEVICE — ADHESIVE DERMABOND ADVANCED

## (undated) DEVICE — GUIDEWIRE AMPLATZ .035INX180X7

## (undated) DEVICE — GUIDEWIRE AMPLATZ XSTIFF 180CM

## (undated) DEVICE — KIT MICROINTRO 4F .018X40X7CM